# Patient Record
Sex: FEMALE | Race: WHITE | NOT HISPANIC OR LATINO | Employment: UNEMPLOYED | URBAN - METROPOLITAN AREA
[De-identification: names, ages, dates, MRNs, and addresses within clinical notes are randomized per-mention and may not be internally consistent; named-entity substitution may affect disease eponyms.]

---

## 2017-01-04 DIAGNOSIS — K21.9 GASTROESOPHAGEAL REFLUX DISEASE WITHOUT ESOPHAGITIS: ICD-10-CM

## 2017-01-04 DIAGNOSIS — E03.9 ACQUIRED HYPOTHYROIDISM: ICD-10-CM

## 2017-01-04 RX ORDER — LEVOTHYROXINE SODIUM 137 UG/1
137 TABLET ORAL
Qty: 30 TAB | Refills: 5 | Status: SHIPPED | OUTPATIENT
Start: 2017-01-04 | End: 2018-06-08

## 2017-01-04 RX ORDER — OMEPRAZOLE 20 MG/1
20 CAPSULE, DELAYED RELEASE ORAL DAILY
Qty: 30 CAP | Refills: 5 | Status: SHIPPED | OUTPATIENT
Start: 2017-01-04 | End: 2017-03-31 | Stop reason: SDUPTHER

## 2017-01-04 NOTE — TELEPHONE ENCOUNTER
========Cyclobenzaprine 5mg qhs is no longer on med list, I do not see any note of why it was discontinued. Refill? Or reason d/c?=======    Last seen: 12/28/16 by Dr. Amaya  Next appt: 2/1/17 with Dr. Amaya    Was the patient seen in the last year in this department? Yes   Does patient have an active prescription for medications requested? No   Received Request Via: Pharmacy

## 2017-02-28 RX ORDER — OMEGA-3/DHA/EPA/FISH OIL 60 MG-90MG
CAPSULE ORAL
Qty: 30 CAP | Refills: 0 | Status: SHIPPED | OUTPATIENT
Start: 2017-02-28 | End: 2017-04-04 | Stop reason: SDUPTHER

## 2017-02-28 NOTE — TELEPHONE ENCOUNTER
Last seen: 12/28/16 by Dr. Amaya  Next appt: 03/06/17 with Dr. Amaya    Was the patient seen in the last year in this department? Yes   Does patient have an active prescription for medications requested? No   Received Request Via: Pharmacy

## 2017-03-06 ENCOUNTER — APPOINTMENT (OUTPATIENT)
Dept: INTERNAL MEDICINE | Facility: MEDICAL CENTER | Age: 42
End: 2017-03-06
Payer: MEDICARE

## 2017-03-08 ENCOUNTER — OFFICE VISIT (OUTPATIENT)
Dept: INTERNAL MEDICINE | Facility: MEDICAL CENTER | Age: 42
End: 2017-03-08
Payer: MEDICARE

## 2017-03-08 ENCOUNTER — HOSPITAL ENCOUNTER (OUTPATIENT)
Facility: MEDICAL CENTER | Age: 42
End: 2017-03-08
Attending: STUDENT IN AN ORGANIZED HEALTH CARE EDUCATION/TRAINING PROGRAM
Payer: MEDICARE

## 2017-03-08 VITALS
SYSTOLIC BLOOD PRESSURE: 153 MMHG | HEART RATE: 85 BPM | HEIGHT: 69 IN | OXYGEN SATURATION: 94 % | WEIGHT: 288.6 LBS | TEMPERATURE: 97.8 F | DIASTOLIC BLOOD PRESSURE: 100 MMHG | BODY MASS INDEX: 42.75 KG/M2

## 2017-03-08 DIAGNOSIS — N63.25 BREAST LUMP ON LEFT SIDE AT 9 O'CLOCK POSITION: ICD-10-CM

## 2017-03-08 DIAGNOSIS — Z01.419 VISIT FOR GYNECOLOGIC EXAMINATION: ICD-10-CM

## 2017-03-08 DIAGNOSIS — R30.0 DYSURIA: ICD-10-CM

## 2017-03-08 DIAGNOSIS — M25.572 CHRONIC PAIN OF LEFT ANKLE: ICD-10-CM

## 2017-03-08 DIAGNOSIS — E66.01 MORBID OBESITY WITH BMI OF 40.0-44.9, ADULT (HCC): ICD-10-CM

## 2017-03-08 DIAGNOSIS — N63.15 BREAST LUMP ON RIGHT SIDE AT 3 O'CLOCK POSITION: ICD-10-CM

## 2017-03-08 DIAGNOSIS — G89.29 CHRONIC PAIN OF LEFT ANKLE: ICD-10-CM

## 2017-03-08 DIAGNOSIS — R03.0 ELEVATED BLOOD PRESSURE READING WITHOUT DIAGNOSIS OF HYPERTENSION: ICD-10-CM

## 2017-03-08 PROCEDURE — 87491 CHLMYD TRACH DNA AMP PROBE: CPT | Mod: GZ

## 2017-03-08 PROCEDURE — 4004F PT TOBACCO SCREEN RCVD TLK: CPT | Mod: 8P,GC | Performed by: INTERNAL MEDICINE

## 2017-03-08 PROCEDURE — 87591 N.GONORRHOEAE DNA AMP PROB: CPT | Mod: GZ

## 2017-03-08 PROCEDURE — G8482 FLU IMMUNIZE ORDER/ADMIN: HCPCS | Mod: GC | Performed by: INTERNAL MEDICINE

## 2017-03-08 PROCEDURE — 87070 CULTURE OTHR SPECIMN AEROBIC: CPT

## 2017-03-08 PROCEDURE — G8432 DEP SCR NOT DOC, RNG: HCPCS | Mod: GC | Performed by: INTERNAL MEDICINE

## 2017-03-08 PROCEDURE — 99214 OFFICE O/P EST MOD 30 MIN: CPT | Mod: GC | Performed by: INTERNAL MEDICINE

## 2017-03-08 PROCEDURE — 87205 SMEAR GRAM STAIN: CPT

## 2017-03-08 PROCEDURE — G8419 CALC BMI OUT NRM PARAM NOF/U: HCPCS | Mod: GC | Performed by: INTERNAL MEDICINE

## 2017-03-08 ASSESSMENT — PATIENT HEALTH QUESTIONNAIRE - PHQ9: CLINICAL INTERPRETATION OF PHQ2 SCORE: 0

## 2017-03-08 NOTE — MR AVS SNAPSHOT
"        Frances Ardon   3/8/2017 10:00 AM   Office Visit   MRN: [id number]    Department:  [facility] Med - Internal Med   Dept Phone:  [phone]    Description:  Female : 1975   Provider:  Javi Chu M.D.           Reason for Visit     Gynecologic Exam pap smear      Allergies as of 3/8/2017     No Known Allergies      You were diagnosed with     Dysuria   [788.1.ICD-9-CM]       Chronic pain of left ankle   [4766660]       Breast lump on left side at 9 o'clock position   [318314]       Breast lump   [368787]       Breast lump on right side at 3 o'clock position   [573029]       Visit for gynecologic examination   [160171]         Vital Signs     Blood Pressure Pulse Temperature Height Weight Body Mass Index    153/100 mmHg 85 36.6 °C (97.8 °F) 1.753 m (5' 9\") 130.908 kg (288 lb 9.6 oz) 42.60 kg/m2    Oxygen Saturation Smoking Status                94% Current Every Day Smoker          Basic Information     Date Of Birth Sex Race Ethnicity Preferred Language    1975 Female Unknown Non- English      Your appointments     Mar 15, 2017 10:30 AM   Established Patient with Ronni Sebastian M.D.   [facility] Med - Internal Medicine (--)    [address] [place] [address] [id number] [phone]           You will be receiving a confirmation call a few days before your appointment from our automated call confirmation system.              Problem List              ICD-10-CM Priority Class Noted - Resolved    Low back pain M54.5   2016 - Present    Constipation K59.00   2016 - Present    Mood disorder (CMS-HCC) F39   2016 - Present    Left ankle pain M25.572   2016 - Present    Morbid obesity (CMS-HCC) E66.01   2016 - Present    Tobacco dependence F17.200   2016 - Present    Hypothyroidism E03.9   2016 - Present    GERD (gastroesophageal reflux disease) K21.9   2016 - Present    Mental retardation, mild (I.Q. 50-70) F70   2016 " - Present    Dyslipidemia E78.5   12/27/2016 - Present    History of seizures Z87.898   12/27/2016 - Present      Health Maintenance        Date Due Completion Dates    IMM PNEUMOCOCCAL 19-64 (ADULT) MEDIUM RISK SERIES (1 of 1 - PPSV23) 11/9/1993 ---    PAP SMEAR 11/9/1995 ---    MAMMOGRAM 11/9/2014 ---    IMM DTaP/Tdap/Td Vaccine (2 - Td) 10/11/2019 10/11/2009            Current Immunizations     Influenza Vaccine Quad Inj (Pf) 10/5/2012    Influenza Vaccine Quad Inj (Preserved) 12/28/2016    Tdap Vaccine 10/11/2009      Below and/or attached are the medications your provider expects you to take. Review all of your home medications and newly ordered medications with your provider and/or pharmacist. Follow medication instructions as directed by your provider and/or pharmacist. Please keep your medication list with you and share with your provider. Update the information when medications are discontinued, doses are changed, or new medications (including over-the-counter products) are added; and carry medication information at all times in the event of emergency situations     Allergies:  No Known Allergies          Medications  Valid as of: March 08, 2017 - 10:57 AM    Generic Name Brand Name Tablet Size Instructions for use    Ibuprofen (Tab) MOTRIN 800 MG Take 1 Tab by mouth every 8 hours as needed for Mild Pain.        Levothyroxine Sodium (Tab) SYNTHROID 137 MCG Take 1 Tab by mouth Every morning on an empty stomach.        Omega-3 Fatty Acids (Cap) Fish Oil 500 MG TAKE 1 CAPSULE BY MOUTH 1 TIME DAILY        Omeprazole (CAPSULE DELAYED RELEASE) PRILOSEC 20 MG Take 1 Cap by mouth every day.        Paliperidone   Take  by mouth.        Polyethylene Glycol 3350 (Pack) MIRALAX  Take 17 g by mouth every day.        Sennosides (Tab) SENOKOT 8.6 MG Take 8.6 mg by mouth 1 time daily as needed.        Simvastatin   Take  by mouth.        .                 Medicines prescribed today were sent to:     [facility] -  [place], [place] - [address]    [address] [facility] [address]    Phone: [phone] Fax: [phone]    Open 24 Hours?: No      Medication refill instructions:       If your prescription bottle indicates you have medication refills left, it is not necessary to call your provider’s office. Please contact your pharmacy and they will refill your medication.    If your prescription bottle indicates you do not have any refills left, you may request refills at any time through one of the following ways: The online [employer] system (except Urgent Care), by calling your provider’s office, or by asking your pharmacy to contact your provider’s office with a refill request. Medication refills are processed only during regular business hours and may not be available until the next business day. Your provider may request additional information or to have a follow-up visit with you prior to refilling your medication.   *Please Note: Medication refills are assigned a new Rx number when refilled electronically. Your pharmacy may indicate that no refills were authorized even though a new prescription for the same medication is available at the pharmacy. Please request the medicine by name with the pharmacy before contacting your provider for a refill.        Your To Do List     Future Labs/Procedures Complete By Expires    CHLAMYDIA/GC PCR URINE OR SWAB  As directed 3/8/2018    URINALYSIS,CULTURE IF INDICATED  As directed 3/8/2018    US-BREAST BILAT-LIMITED  As directed 3/8/2018      Referral     A referral request has been sent to our patient care coordination department. Please allow 3-5 business days for us to process this request and contact you either by phone or mail. If you do not hear from us by the 5th business day, please call us at [phone].        Instructions    Return to clinic in 1 week with Dr. Sebastian for blood pressure check and addressing chronic issues  Referrals placed for gynecology and  podiatry  Perform urine study          MyChart Status: Patient Declined        Quit Tobacco Information     Do you want to quit using tobacco?    Quitting tobacco decreases risks of cancer, heart and lung disease, increases life expectancy, improves sense of taste and smell, and increases spending money, among other benefits.    If you are thinking about quitting, we can help.  • [facility] Quit Tobacco Program: 225.766.4049  o Program occurs weekly for four weeks and includes pharmacist consultation on products to support quitting smoking or chewing tobacco. A provider referral is needed for pharmacist consultation.  • Tobacco Users Help Hotline: 0-327-QUIT-NOW (837-3766) or https://[place].quitlogix.org/  o Free, confidential telephone and online coaching for [place] residents. Sessions are designed on a schedule that is convenient for you. Eligible clients receive free nicotine replacement therapy.  • Nationally: www.smokefree.gov  o Information and professional assistance to support both immediate and long-term needs as you become, and remain, a non-smoker. Smokefree.gov allows you to choose the help that best fits your needs.

## 2017-03-08 NOTE — PATIENT INSTRUCTIONS
Return to clinic in 1 week with Dr. Sebastian for blood pressure check and addressing chronic issues  Referrals placed for gynecology and podiatry  Perform urine study

## 2017-03-09 LAB
GRAM STN SPEC: NORMAL
SIGNIFICANT IND 70042: NORMAL
SITE SITE: NORMAL
SOURCE SOURCE: NORMAL

## 2017-03-09 NOTE — PROGRESS NOTES
Established Patient    Frances presents today with the following:    CC: Annual pap smear    HPI:     42 y/o female who presents initially for PAP smear. She has acute concerns as follows:    Breast lump: patient states she felt some pain in her bilateral breasts starting at 11:30 last night which developed into lumps she could feel bilaterally this morning. This has concerned her. No discharge noted or previous history of the same. Has never had a mammogram. No discharge from the nipple. Recently finished her menstrual period about 2 days ago and has never experienced fibrocystic changes in her breast.    Dysuria: Intermittent dysuria described by patient without systemic fevers or chills. She has noticed also some difficulty with urination as well.     Joint pains: chronic ankle and back pain she has experienced and states that her left ankle at the achilles tendon is hurting more. She has had previous surgery to same ankle in the past and is requesting some sort of therapy for pain.         Patient Active Problem List    Diagnosis Date Noted   • Low back pain 12/27/2016   • Constipation 12/27/2016   • Mood disorder (CMS-HCC) 12/27/2016   • Left ankle pain 12/27/2016   • Morbid obesity (CMS-HCC) 12/27/2016   • Tobacco dependence 12/27/2016   • Hypothyroidism 12/27/2016   • GERD (gastroesophageal reflux disease) 12/27/2016   • Mental retardation, mild (I.Q. 50-70) 12/27/2016   • Dyslipidemia 12/27/2016   • History of seizures 12/27/2016       Current Outpatient Prescriptions   Medication Sig Dispense Refill   • Omega-3 Fatty Acids (FISH OIL) 500 MG Cap TAKE 1 CAPSULE BY MOUTH 1 TIME DAILY 30 Cap 0   • levothyroxine (SYNTHROID) 137 MCG Tab Take 1 Tab by mouth Every morning on an empty stomach. 30 Tab 5   • omeprazole (PRILOSEC) 20 MG delayed-release capsule Take 1 Cap by mouth every day. 30 Cap 5   • polyethylene glycol/lytes (MIRALAX) Pack Take 17 g by mouth every day.     • Paliperidone (INVEGA PO) Take  by  "mouth.     • ibuprofen (MOTRIN) 800 MG TABS Take 1 Tab by mouth every 8 hours as needed for Mild Pain. 30 Tab 3   • sennosides (SENOKOT) 8.6 MG Tab Take 8.6 mg by mouth 1 time daily as needed.     • Simvastatin (ZOCOR PO) Take  by mouth.       No current facility-administered medications for this visit.           ROS: As per HPI. Denies nausea/vomiting/chest pain/shortness of breath. Has concurrent lower back pain which is chronic and recent weight gain.    /100 mmHg  Pulse 85  Temp(Src) 36.6 °C (97.8 °F)  Ht 1.753 m (5' 9\")  Wt 130.908 kg (288 lb 9.6 oz)  BMI 42.60 kg/m2  SpO2 94%        Physical Exam   Constitutional:  42 y/o female in no apparent distress, pleasant and cooperative  HEENT: PERRLA, EOMI, moist oral mucosa  Breast: 2-3 cm lump felt at 9 oclock position of left breast and 3 oclock position on right breast with some extension medially, firm without fluctuance noted, no axillary adenopathy, no nipple discharge, no obvious erythema or outward signs of trauma  Cardiovascular: RRR, S1 and S2 physiologic, no S3, no murmur or rubs  Pulmonary/Chest: CTAB, no wheeze or crackles  Abdomen: Obese abdomen, soft non tender, no distension, no guarding or rigidity  Musculoskeletal:  Swelling at lower left ankle which appears symmetrical to right side, mild pain to palpation over the heel with no erythema or warmth noted, no other joint abnormalities  Lymphadenopathy: no appreciable cervical LAD or axillary lymphadenopathy  Neurological:  Mild mental retardation, otherwise no focal motor or sensory deficits  GYN: Cervix unable to be visualized on gynecologic exam, skin tag present near outer labial fold on left, speculum exam reveals a dark brown to yellow serous secretion in vaginal vault without pelvic side wall erythema      Assessment and Plan    1. Dysuria  - on exam has vaginal discharge without pelvic side wall erythema, possible fungal appearance of drainage but no pelvic side wall erythema  - no " systemic signs or symptoms   - not currently sexually active but states unprotected sexual intercourse unknown how long ago  - drainage possibly represents old coagulated blood given recent menstrual period  Plan:  - patient unable to provide urinalysis as she urinated before appointment, sent to lab to get UA done  - CHLAMYDIA/GC PCR URINE OR SWAB; Future  - VAGINAL YEAST CULTURE  - needs repeat Pap/pelvic once period is done to make sure drainage resolved; hold off on treating as pt not sxmatic and it could rep resolving menstruation     2. Chronic pain of left ankle  - history of previous surgery on same ankle with appearance of chronic microtrauma to area  - REFERRAL TO PODIATRY    3. Breast lump on left side at 9 o'clock position     Breast lump on right side at 3 o'clock position  - possibly fibrocystic disease vs less likely abscess or Dominic's ligament pain  Plan:  - US-BREAST BILAT-LIMITED for further evaluation    4. Visit for gynecologic examination  - pelvic exam and findings as above  Plan:  - REFERRAL TO GYNECOLOGY for pap smear given unable to visualize cervix with our size of speculum    5. Obesity      Elevated blood pressure reading without diagnosis of hypertension  - patient counseled on need to lose weight to improve joint pain and her blood pressure  - BP re-check in one week      Follow up: Return in about 1 week (around 3/15/2017).    Signed by: Javi Chu M.D.

## 2017-03-12 LAB
BACTERIA GENITAL AEROBE CULT: NORMAL
GRAM STN SPEC: NORMAL
SIGNIFICANT IND 70042: NORMAL
SITE SITE: NORMAL
SOURCE SOURCE: NORMAL

## 2017-03-13 ENCOUNTER — TELEPHONE (OUTPATIENT)
Dept: INTERNAL MEDICINE | Facility: MEDICAL CENTER | Age: 42
End: 2017-03-13

## 2017-03-13 DIAGNOSIS — A74.9 CHLAMYDIA: ICD-10-CM

## 2017-03-13 LAB
C TRACH DNA SPEC QL NAA+PROBE: POSITIVE
N GONORRHOEA DNA SPEC QL NAA+PROBE: NEGATIVE
SOURCE 9121: ABNORMAL
SPECIMEN SOURCE: ABNORMAL

## 2017-03-13 RX ORDER — AZITHROMYCIN 500 MG/1
TABLET, FILM COATED ORAL
Qty: 2 TAB | Refills: 0 | Status: SHIPPED | OUTPATIENT
Start: 2017-03-13 | End: 2017-05-25
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
## 2017-03-13 NOTE — TELEPHONE ENCOUNTER
Patient called and informed that her swab was negative for bacterial infection. She has yet to do her breast ultrasound.

## 2017-03-14 ENCOUNTER — HOSPITAL ENCOUNTER (OUTPATIENT)
Dept: LAB | Facility: MEDICAL CENTER | Age: 42
End: 2017-03-14
Attending: STUDENT IN AN ORGANIZED HEALTH CARE EDUCATION/TRAINING PROGRAM
Payer: MEDICARE

## 2017-03-14 DIAGNOSIS — R30.0 DYSURIA: ICD-10-CM

## 2017-03-14 LAB
APPEARANCE UR: CLEAR
BACTERIA #/AREA URNS HPF: ABNORMAL /HPF
BILIRUB UR QL STRIP.AUTO: NEGATIVE
COLOR UR AUTO: ABNORMAL
CULTURE IF INDICATED INDCX: YES UA CULTURE
EPITHELIAL CELLS 1715: ABNORMAL /HPF
GLUCOSE UR STRIP.AUTO-MCNC: NEGATIVE MG/DL
KETONES UR STRIP.AUTO-MCNC: NEGATIVE MG/DL
LEUKOCYTE ESTERASE UR QL STRIP.AUTO: ABNORMAL
MICRO URNS: ABNORMAL
NITRITE UR QL STRIP.AUTO: NEGATIVE
PH UR: 7 [PH]
PROT UR QL STRIP: NEGATIVE MG/DL
RBC #/AREA URNS HPF: ABNORMAL /HPF
RBC UR QL AUTO: NEGATIVE
SP GR UR STRIP.AUTO: 1.01
WBC #/AREA URNS HPF: ABNORMAL /HPF

## 2017-03-14 PROCEDURE — 81001 URINALYSIS AUTO W/SCOPE: CPT

## 2017-03-14 PROCEDURE — 87086 URINE CULTURE/COLONY COUNT: CPT

## 2017-03-16 ENCOUNTER — OFFICE VISIT (OUTPATIENT)
Dept: INTERNAL MEDICINE | Facility: MEDICAL CENTER | Age: 42
End: 2017-03-16
Payer: MEDICARE

## 2017-03-16 VITALS
BODY MASS INDEX: 43.07 KG/M2 | TEMPERATURE: 97.2 F | HEIGHT: 69 IN | WEIGHT: 290.8 LBS | DIASTOLIC BLOOD PRESSURE: 88 MMHG | SYSTOLIC BLOOD PRESSURE: 135 MMHG | OXYGEN SATURATION: 98 % | HEART RATE: 78 BPM

## 2017-03-16 DIAGNOSIS — M25.532 LEFT WRIST PAIN: ICD-10-CM

## 2017-03-16 DIAGNOSIS — N39.0 URINARY TRACT INFECTION WITHOUT HEMATURIA, SITE UNSPECIFIED: ICD-10-CM

## 2017-03-16 LAB
BACTERIA UR CULT: NORMAL
SIGNIFICANT IND 70042: NORMAL
SOURCE SOURCE: NORMAL

## 2017-03-16 PROCEDURE — G8419 CALC BMI OUT NRM PARAM NOF/U: HCPCS | Mod: GC | Performed by: INTERNAL MEDICINE

## 2017-03-16 PROCEDURE — G8432 DEP SCR NOT DOC, RNG: HCPCS | Mod: GC | Performed by: INTERNAL MEDICINE

## 2017-03-16 PROCEDURE — 99213 OFFICE O/P EST LOW 20 MIN: CPT | Mod: GE | Performed by: INTERNAL MEDICINE

## 2017-03-16 PROCEDURE — 4004F PT TOBACCO SCREEN RCVD TLK: CPT | Mod: GC | Performed by: INTERNAL MEDICINE

## 2017-03-16 PROCEDURE — G8482 FLU IMMUNIZE ORDER/ADMIN: HCPCS | Mod: GC | Performed by: INTERNAL MEDICINE

## 2017-03-16 RX ORDER — NITROFURANTOIN 25; 75 MG/1; MG/1
100 CAPSULE ORAL 2 TIMES DAILY
Qty: 14 CAP | Refills: 0 | Status: SHIPPED | OUTPATIENT
Start: 2017-03-16 | End: 2017-03-28 | Stop reason: SDUPTHER
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
## 2017-03-16 NOTE — MR AVS SNAPSHOT
"        Frances Ardon   3/16/2017 4:15 PM   Office Visit   MRN: [id number]    Department:  [facility] Med - Internal Med   Dept Phone:  [phone]    Description:  Female : 1975   Provider:  Ronni Sebastian M.D.           Reason for Visit     Follow-Up follow up    Hand Pain x2 days and see if she can get brace for it for work      Allergies as of 3/16/2017     No Known Allergies      You were diagnosed with     Urinary tract infection without hematuria, site unspecified   [3040980]       Left wrist pain   [100874]         Vital Signs     Blood Pressure Pulse Temperature Height Weight Body Mass Index    135/88 mmHg 78 36.2 °C (97.2 °F) 1.753 m (5' 9\") 131.906 kg (290 lb 12.8 oz) 42.92 kg/m2    Oxygen Saturation Smoking Status                98% Current Every Day Smoker          Basic Information     Date Of Birth Sex Race Ethnicity Preferred Language    1975 Female Unknown Non- English      Your appointments     2017  2:00 PM   MA DX30 with S VERO MG 1   [facility] MAMMOGRAPHY ([place])    [address]  [facility] [id number] [phone]            May 16, 2017 10:00 AM   New Patient with Danita Rivers M.D.   [facility] ([address])    [address]  [facility] [id number] [phone]            May 25, 2017  1:15 PM   Established Patient with Ronni Sebastian M.D.   [facility] Med - Internal Medicine (--)    [address] [place] [address]  [address]  [facility] [id number] [phone]           You will be receiving a confirmation call a few days before your appointment from our automated call confirmation system.              Problem List              ICD-10-CM Priority Class Noted - Resolved    Low back pain M54.5   2016 - Present    Constipation K59.00   2016 - Present    Mood disorder (CMS-HCC) F39   2016 - Present    Left ankle pain M25.572   2016 - Present    Morbid " obesity (CMS-[facility]) E66.01   12/27/2016 - Present    Tobacco dependence F17.200   12/27/2016 - Present    Hypothyroidism E03.9   12/27/2016 - Present    GERD (gastroesophageal reflux disease) K21.9   12/27/2016 - Present    Mental retardation, mild (I.Q. 50-70) F70   12/27/2016 - Present    Dyslipidemia E78.5   12/27/2016 - Present    History of seizures Z87.898   12/27/2016 - Present    Elevated blood pressure reading without diagnosis of hypertension R03.0   3/8/2017 - Present    Breast lump on left side at 9 o'clock position N63   3/8/2017 - Present    Breast lump on right side at 3 o'clock position N63   3/8/2017 - Present      Health Maintenance        Date Due Completion Dates    IMM PNEUMOCOCCAL 19-64 (ADULT) MEDIUM RISK SERIES (1 of 1 - PPSV23) 11/9/1993 ---    PAP SMEAR 11/9/1995 ---    MAMMOGRAM 11/9/2014 ---    IMM DTaP/Tdap/Td Vaccine (2 - Td) 10/11/2019 10/11/2009            Current Immunizations     Influenza Vaccine Quad Inj (Pf) 10/5/2012    Influenza Vaccine Quad Inj (Preserved) 12/28/2016    Tdap Vaccine 10/11/2009      Below and/or attached are the medications your provider expects you to take. Review all of your home medications and newly ordered medications with your provider and/or pharmacist. Follow medication instructions as directed by your provider and/or pharmacist. Please keep your medication list with you and share with your provider. Update the information when medications are discontinued, doses are changed, or new medications (including over-the-counter products) are added; and carry medication information at all times in the event of emergency situations     Allergies:  No Known Allergies          Medications  Valid as of: March 16, 2017 -  4:24 PM    Generic Name Brand Name Tablet Size Instructions for use    Azithromycin (Tab) ZITHROMAX 500 MG Please take 1g of Azithromycin x 1.        Azithromycin (Tab) ZITHROMAX 500 MG Please take 1g of Lucia x 1 for infection.        Elastic  Bandages & Supports (Misc) Wrist Brace//Left Large  1 Units by Does not apply route every day for 7 days.        Ibuprofen (Tab) MOTRIN 800 MG Take 1 Tab by mouth every 8 hours as needed for Mild Pain.        Levothyroxine Sodium (Tab) SYNTHROID 137 MCG Take 1 Tab by mouth Every morning on an empty stomach.        Nitrofurantoin Monohyd Macro (Cap) MACROBID 100 MG Take 1 Cap by mouth 2 times a day for 14 days.        Omega-3 Fatty Acids (Cap) Fish Oil 500 MG TAKE 1 CAPSULE BY MOUTH 1 TIME DAILY        Omeprazole (CAPSULE DELAYED RELEASE) PRILOSEC 20 MG Take 1 Cap by mouth every day.        Paliperidone   Take  by mouth.        Polyethylene Glycol 3350 (Pack) MIRALAX  Take 17 g by mouth every day.        Sennosides (Tab) SENOKOT 8.6 MG Take 8.6 mg by mouth 1 time daily as needed.        Simvastatin   Take  by mouth.        .                 Medicines prescribed today were sent to:     [facility] [address] [facility] [address]    Phone: [phone] Fax: [phone]    Open 24 Hours?: No      Medication refill instructions:       If your prescription bottle indicates you have medication refills left, it is not necessary to call your provider’s office. Please contact your pharmacy and they will refill your medication.    If your prescription bottle indicates you do not have any refills left, you may request refills at any time through one of the following ways: The online [employer] system (except Urgent Care), by calling your provider’s office, or by asking your pharmacy to contact your provider’s office with a refill request. Medication refills are processed only during regular business hours and may not be available until the next business day. Your provider may request additional information or to have a follow-up visit with you prior to refilling your medication.   *Please Note: Medication refills are assigned a new Rx number when refilled electronically. Your  pharmacy may indicate that no refills were authorized even though a new prescription for the same medication is available at the pharmacy. Please request the medicine by name with the pharmacy before contacting your provider for a refill.        Instructions    Return in about 10 weeks (around 5/25/2017).            MyChart Status: Patient Declined        Quit Tobacco Information     Do you want to quit using tobacco?    Quitting tobacco decreases risks of cancer, heart and lung disease, increases life expectancy, improves sense of taste and smell, and increases spending money, among other benefits.    If you are thinking about quitting, we can help.  • [facility] Quit Tobacco Program: [phone]  o Program occurs weekly for four weeks and includes pharmacist consultation on products to support quitting smoking or chewing tobacco. A provider referral is needed for pharmacist consultation.  • Tobacco Users Help Hotline: 9-358-QUIT-NOW (295-6588) or https://[place].quitlogix.org/  o Free, confidential telephone and online coaching for [place] residents. Sessions are designed on a schedule that is convenient for you. Eligible clients receive free nicotine replacement therapy.  • Nationally: www.smokefree.gov  o Information and professional assistance to support both immediate and long-term needs as you become, and remain, a non-smoker. Smokefree.gov allows you to choose the help that best fits your needs.

## 2017-03-17 NOTE — PROGRESS NOTES
Established Patient    Frances Ardon is a 41 y.o. female who presents today with the following:    CC: ***    HPI: ***    No problem-specific assessment & plan notes found for this encounter.        ROS: As per HPI. Additional pertinent symptoms as noted below.    ROS:  {ROS:09856}    Patient Active Problem List    Diagnosis Date Noted   • Elevated blood pressure reading without diagnosis of hypertension 03/08/2017   • Breast lump on left side at 9 o'clock position 03/08/2017   • Breast lump on right side at 3 o'clock position 03/08/2017   • Low back pain 12/27/2016   • Constipation 12/27/2016   • Mood disorder (CMS-HCC) 12/27/2016   • Left ankle pain 12/27/2016   • Morbid obesity (CMS-[facility]) 12/27/2016   • Tobacco dependence 12/27/2016   • Hypothyroidism 12/27/2016   • GERD (gastroesophageal reflux disease) 12/27/2016   • Mental retardation, mild (I.Q. 50-70) 12/27/2016   • Dyslipidemia 12/27/2016   • History of seizures 12/27/2016       Social History   Substance Use Topics   • Smoking status: Current Every Day Smoker -- 1.00 packs/day for 12 years   • Smokeless tobacco: Never Used   • Alcohol Use: No       Current Outpatient Prescriptions   Medication Sig Dispense Refill   • Elastic Bandages & Supports (WRIST BRACE//LEFT LARGE) Misc 1 Units by Does not apply route every day for 7 days. 1 Each 0   • nitrofurantoin monohydr macro (MACROBID) 100 MG Cap Take 1 Cap by mouth 2 times a day for 14 days. 14 Cap 0   • azithromycin (ZITHROMAX) 500 MG tablet Please take 1g of Azithromycin x 1. 2 Tab 0   • azithromycin (ZITHROMAX) 500 MG tablet Please take 1g of Lucia x 1 for infection. 2 Tab 0   • Omega-3 Fatty Acids (FISH OIL) 500 MG Cap TAKE 1 CAPSULE BY MOUTH 1 TIME DAILY 30 Cap 0   • levothyroxine (SYNTHROID) 137 MCG Tab Take 1 Tab by mouth Every morning on an empty stomach. 30 Tab 5   • omeprazole (PRILOSEC) 20 MG delayed-release capsule Take 1 Cap by mouth every day. 30 Cap 5   • polyethylene glycol/lytes  "(MIRALAX) Pack Take 17 g by mouth every day.     • sennosides (SENOKOT) 8.6 MG Tab Take 8.6 mg by mouth 1 time daily as needed.     • Paliperidone (INVEGA PO) Take  by mouth.     • Simvastatin (ZOCOR PO) Take  by mouth.     • ibuprofen (MOTRIN) 800 MG TABS Take 1 Tab by mouth every 8 hours as needed for Mild Pain. 30 Tab 3     No current facility-administered medications for this visit.       /88 mmHg  Pulse 78  Temp(Src) 36.2 °C (97.2 °F)  Ht 1.753 m (5' 9\")  Wt 131.906 kg (290 lb 12.8 oz)  BMI 42.92 kg/m2  SpO2 98%    Physical Exam  General: Well developed, well nourished female, in no distress.  Eyes: Conjuntiva without any obvious injection or erythema.   Ears- canals and TMs clear  Mouth- mucous membranes moist, no erythema  Cardiovascular: Heart is regular with no murmurs, no bruits  Lungs: Clear to auscultation bilaterally. No wheezes, rhonci or crackles heard. Respiratory effort is normal.  Abd: Soft, non-tender  Ext: No edema  Other: ***      Assessment and Plan      1. Urinary tract infection without hematuria, site unspecified  ***  - nitrofurantoin monohydr macro (MACROBID) 100 MG Cap; Take 1 Cap by mouth 2 times a day for 14 days.  Dispense: 14 Cap; Refill: 0    2. Left wrist pain  ***  - Elastic Bandages & Supports (WRIST BRACE//LEFT LARGE) Misc; 1 Units by Does not apply route every day for 7 days.  Dispense: 1 Each; Refill: 0      Return in about 10 weeks (around 5/25/2017).      Signed by: Gisel Dupont M.D.    This note was created using voice recognition software. There may be unintended errors in spelling, grammar or content.              "

## 2017-03-28 DIAGNOSIS — N39.0 URINARY TRACT INFECTION WITHOUT HEMATURIA, SITE UNSPECIFIED: ICD-10-CM

## 2017-03-28 RX ORDER — NITROFURANTOIN 25; 75 MG/1; MG/1
100 CAPSULE ORAL 2 TIMES DAILY
Qty: 14 CAP | Refills: 0 | Status: SHIPPED | OUTPATIENT
Start: 2017-03-28 | End: 2017-04-04

## 2017-03-31 DIAGNOSIS — K21.9 GASTROESOPHAGEAL REFLUX DISEASE WITHOUT ESOPHAGITIS: ICD-10-CM

## 2017-03-31 RX ORDER — OMEPRAZOLE 20 MG/1
20 CAPSULE, DELAYED RELEASE ORAL DAILY
Qty: 90 CAP | Refills: 0 | Status: SHIPPED | OUTPATIENT
Start: 2017-03-31 | End: 2017-06-29 | Stop reason: SDUPTHER

## 2017-03-31 NOTE — TELEPHONE ENCOUNTER
Last seen: 03/16/17 by Dr. Sebastian  Next appt: 05/25/17 with Dr. Sebastian    Was the patient seen in the last year in this department? Yes   Does patient have an active prescription for medications requested? No   Received Request Via: Pharmacy

## 2017-04-04 ENCOUNTER — HOSPITAL ENCOUNTER (OUTPATIENT)
Dept: RADIOLOGY | Facility: MEDICAL CENTER | Age: 42
End: 2017-04-04
Attending: STUDENT IN AN ORGANIZED HEALTH CARE EDUCATION/TRAINING PROGRAM
Payer: MEDICARE

## 2017-04-04 DIAGNOSIS — N63.15 BREAST LUMP ON RIGHT SIDE AT 3 O'CLOCK POSITION: ICD-10-CM

## 2017-04-04 DIAGNOSIS — N63.25 BREAST LUMP ON LEFT SIDE AT 9 O'CLOCK POSITION: ICD-10-CM

## 2017-04-04 PROCEDURE — G0204 DX MAMMO INCL CAD BI: HCPCS

## 2017-04-04 RX ORDER — OMEGA-3/DHA/EPA/FISH OIL 60 MG-90MG
CAPSULE ORAL
Qty: 90 CAP | Refills: 0 | Status: SHIPPED | OUTPATIENT
Start: 2017-04-04 | End: 2017-06-29 | Stop reason: SDUPTHER

## 2017-05-05 DIAGNOSIS — M25.572 LEFT ANKLE PAIN, UNSPECIFIED CHRONICITY: ICD-10-CM

## 2017-05-08 RX ORDER — IBUPROFEN 800 MG/1
800 TABLET ORAL EVERY 8 HOURS PRN
Qty: 30 TAB | Refills: 3 | Status: SHIPPED | OUTPATIENT
Start: 2017-05-08 | End: 2018-05-30 | Stop reason: SDUPTHER

## 2017-05-23 ENCOUNTER — GYNECOLOGY VISIT (OUTPATIENT)
Dept: OBGYN | Facility: CLINIC | Age: 42
End: 2017-05-23
Payer: MEDICARE

## 2017-05-23 ENCOUNTER — HOSPITAL ENCOUNTER (OUTPATIENT)
Facility: MEDICAL CENTER | Age: 42
End: 2017-05-23
Attending: OBSTETRICS & GYNECOLOGY
Payer: MEDICARE

## 2017-05-23 VITALS
WEIGHT: 293 LBS | DIASTOLIC BLOOD PRESSURE: 88 MMHG | HEIGHT: 69 IN | BODY MASS INDEX: 43.4 KG/M2 | SYSTOLIC BLOOD PRESSURE: 132 MMHG

## 2017-05-23 DIAGNOSIS — Z12.4 ROUTINE CERVICAL SMEAR: ICD-10-CM

## 2017-05-23 DIAGNOSIS — Z01.419 WELL WOMAN EXAM: ICD-10-CM

## 2017-05-23 DIAGNOSIS — Z11.51 SCREENING FOR HPV (HUMAN PAPILLOMAVIRUS): ICD-10-CM

## 2017-05-23 PROCEDURE — G0101 CA SCREEN;PELVIC/BREAST EXAM: HCPCS | Performed by: OBSTETRICS & GYNECOLOGY

## 2017-05-23 PROCEDURE — 88175 CYTOPATH C/V AUTO FLUID REDO: CPT

## 2017-05-23 PROCEDURE — 87624 HPV HI-RISK TYP POOLED RSLT: CPT

## 2017-05-23 PROCEDURE — 87591 N.GONORRHOEAE DNA AMP PROB: CPT

## 2017-05-23 PROCEDURE — G8432 DEP SCR NOT DOC, RNG: HCPCS | Performed by: OBSTETRICS & GYNECOLOGY

## 2017-05-23 PROCEDURE — 87491 CHLMYD TRACH DNA AMP PROBE: CPT

## 2017-05-23 NOTE — PROGRESS NOTES
Frances Ardon41 y.o.  female presents for Annual Well Woman Exam.   Patient is currently without complaints. Patient does not report any problems with her menses. She states they are not heavy nor painful. It is difficult to assess whether or not her menstrual cycle is regular as patient has some developmental delay. She states her last period was on Friday. She also reports cramps with her menstrual cycle for which she takes a pain pill seemed to improve her problems  Patient is not on anything for birth control, at this time does not desire any birth control method. She is sexually active with her boyfriend. They plan on getting  and having a child      ROS: Patient is feeling well. No dyspnea or chest pain on exertion. No Abdominal pain, change in bowel habits, black or bloody stools. No urinary sx. GYN ROS:normal menses, no abnormal bleeding, pelvic pain or discharge, no breast pain or new or enlarging lumps on self exam, no discharge or pelvic pain. Denies breast tenderness, mass, discharge, changes in size or contour, or abnormal cyclic discomfort. No neurological complaints.  Past Medical History   Diagnosis Date   • Tobacco dependence 2016   • Hypothyroidism 2016   • GERD (gastroesophageal reflux disease) 2016   • Mental retardation, mild (I.Q. 50-70) 2016   • Dyslipidemia 2016   • History of seizures 2016   • Arthritis    • Asthma    • Hypertension      None  Allergy:   Review of patient's allergies indicates no known allergies.    LMP:       Patient's last menstrual period was 2017. .     Menstrual History: menses irregular: Unknown menstrual history, patient cannot give menstrual history due to developmental delay  Contraceptive Method:none    Pap history, the patient denies abnormal Pap smears and denies   sexually transmitted diseases    Mammo:done normal    Objective : The patient appears well, alert and oriented x 3, in no acute  "distress.  Blood pressure 132/88, height 1.753 m (5' 9\"), weight 135.626 kg (299 lb), last menstrual period 05/19/2017.  HEENT Exam: EOMI, KINDRA, no adenopathy or thyromegaly.  Lungs: Clear to Auscultation   Cor: S1 and S2 normal, no murmurs, or rubs   Abdomen: Soft without tenderness, guarding mass or organomegaly.  Extremities: No edema, pulses equal  Neurological: Normal No focal signs  Breast Exam:Inspection negative. No nipple discharge or bleeding. No masses or nodularity palpable  Pelvic: External genitalia,urethral meatus, urethra, bladder and vagina normal. Cervix, uterus and adnexa intact and normal.  Anus and perineum normal. Bimanual and rectovaginal without masses or tenderness., negative findings: external genitalia normal, Bartholin's glands, urethra, Chattaroy's glands negative, vaginal mucosa normal, cervix clear, normal sized uterus, adnexae negative    Lab:No results found for this or any previous visit (from the past 336 hour(s)).    Assessment:  well woman    Plan:pap smear  counseled on breast self exam, mammography screening and family planning choices  return annually or prn  Smoking Cessation  Contraception:none            "

## 2017-05-23 NOTE — MR AVS SNAPSHOT
"        Frances Ardon   2017 10:30 AM   Gynecology Visit   MRN: [id number]    Department[facility]  [facility]   Dept Phone:  [phone]    Description:  Female : 1975   Provider:  Danita Rivers M.D.           Reason for Visit     Gynecologic Exam           Allergies as of 2017     No Known Allergies      You were diagnosed with     Well woman exam   [089731]       Routine cervical smear   [475114]       Screening for HPV (human papillomavirus)   [693472]         Vital Signs     Blood Pressure Height Weight Body Mass Index Last Menstrual Period Smoking Status    132/88 mmHg 1.753 m (5' 9\") 135.626 kg (299 lb) 44.13 kg/m2 2017 Current Every Day Smoker      Basic Information     Date Of Birth Sex Race Ethnicity Preferred Language    1975 Female Unknown Non- English      Your appointments     May 25, 2017  1:15 PM   Established Patient with Ronni Sebastian M.D.   [facility] - Internal Medicine (--)    [address] [place] [id number] [phone]           You will be receiving a confirmation call a few days before your appointment from our automated call confirmation system.              Problem List              ICD-10-CM Priority Class Noted - Resolved    Low back pain M54.5   2016 - Present    Constipation K59.00   2016 - Present    Mood disorder (CMS-HCC) F39   2016 - Present    Left ankle pain M25.572   2016 - Present    Morbid obesity (CMS-HCC) E66.01   2016 - Present    Tobacco dependence F17.200   2016 - Present    Hypothyroidism E03.9   2016 - Present    GERD (gastroesophageal reflux disease) K21.9   2016 - Present    Mental retardation, mild (I.Q. 50-70) F70   2016 - Present    Dyslipidemia E78.5   2016 - Present    History of seizures Z87.898   2016 - Present    Elevated blood pressure reading without diagnosis of hypertension R03.0   3/8/2017 - Present   " Breast lump on left side at 9 o'clock position N63   3/8/2017 - Present    Breast lump on right side at 3 o'clock position N63   3/8/2017 - Present      Health Maintenance        Date Due Completion Dates    IMM PNEUMOCOCCAL 19-64 (ADULT) MEDIUM RISK SERIES (1 of 1 - PPSV23) 11/9/1993 ---    PAP SMEAR 11/9/1995 ---    MAMMOGRAM 4/4/2018 4/4/2017    IMM DTaP/Tdap/Td Vaccine (2 - Td) 10/11/2019 10/11/2009            Current Immunizations     Influenza Vaccine Quad Inj (Pf) 10/5/2012    Influenza Vaccine Quad Inj (Preserved) 12/28/2016    Tdap Vaccine 10/11/2009      Below and/or attached are the medications your provider expects you to take. Review all of your home medications and newly ordered medications with your provider and/or pharmacist. Follow medication instructions as directed by your provider and/or pharmacist. Please keep your medication list with you and share with your provider. Update the information when medications are discontinued, doses are changed, or new medications (including over-the-counter products) are added; and carry medication information at all times in the event of emergency situations     Allergies:  No Known Allergies          Medications  Valid as of: May 23, 2017 - 10:48 AM    Generic Name Brand Name Tablet Size Instructions for use    Azithromycin (Tab) ZITHROMAX 500 MG Please take 1g of Azithromycin x 1.        Azithromycin (Tab) ZITHROMAX 500 MG Please take 1g of Lucia x 1 for infection.        Ibuprofen (Tab) MOTRIN 800 MG Take 1 Tab by mouth every 8 hours as needed for Mild Pain.        Levothyroxine Sodium (Tab) SYNTHROID 137 MCG Take 1 Tab by mouth Every morning on an empty stomach.        Omega-3 Fatty Acids (Cap) Fish Oil 500 MG TAKE 1 CAPSULE BY MOUTH 1 TIME DAILY        Omeprazole (CAPSULE DELAYED RELEASE) PRILOSEC 20 MG Take 1 Cap by mouth every day.        Paliperidone   Take  by mouth.        Polyethylene Glycol 3350 (Pack) MIRALAX  Take 17 g by mouth every day.         Sennosides (Tab) SENOKOT 8.6 MG Take 8.6 mg by mouth 1 time daily as needed.        Simvastatin   Take  by mouth.        .                 Medicines prescribed today were sent to:     [facility] [address] [facility] [address]    [address] [facility] [address] [facility] [address]    Phone: [phone] Fax: [phone]    Open 24 Hours?: No    [facility] [address] [place] [address] (), [place] - [address]    [address] () [place] [address]    Phone: [phone] Fax: [phone]    Open 24 Hours?: No      Medication refill instructions:       If your prescription bottle indicates you have medication refills left, it is not necessary to call your provider’s office. Please contact your pharmacy and they will refill your medication.    If your prescription bottle indicates you do not have any refills left, you may request refills at any time through one of the following ways: The online [employer] system (except Urgent Care), by calling your provider’s office, or by asking your pharmacy to contact your provider’s office with a refill request. Medication refills are processed only during regular business hours and may not be available until the next business day. Your provider may request additional information or to have a follow-up visit with you prior to refilling your medication.   *Please Note: Medication refills are assigned a new Rx number when refilled electronically. Your pharmacy may indicate that no refills were authorized even though a new prescription for the same medication is available at the pharmacy. Please request the medicine by name with the pharmacy before contacting your provider for a refill.           [employer] Status: Patient Declined        Quit Tobacco Information     Do you want to quit using tobacco?    Quitting tobacco decreases risks of cancer, heart and lung disease, increases life expectancy, improves sense of taste and smell, and increases spending money, among other  benefits.    If you are thinking about quitting, we can help.  • [facility] Quit Tobacco Program: 833-907-4237  o Program occurs weekly for four weeks and includes pharmacist consultation on products to support quitting smoking or chewing tobacco. A provider referral is needed for pharmacist consultation.  • Tobacco Users Help Hotline: 1-800-QUIT-NOW (774-0508) or https://[place].quitlogix.org/  o Free, confidential telephone and online coaching for [place] residents. Sessions are designed on a schedule that is convenient for you. Eligible clients receive free nicotine replacement therapy.  • Nationally: www.smokefree.gov  o Information and professional assistance to support both immediate and long-term needs as you become, and remain, a non-smoker. Smokefree.gov allows you to choose the help that best fits your needs.

## 2017-05-24 LAB
C TRACH DNA GENITAL QL NAA+PROBE: NEGATIVE
CYTOLOGY REG CYTOL: ABNORMAL
HPV HR 12 DNA CVX QL NAA+PROBE: POSITIVE
HPV16 DNA SPEC QL NAA+PROBE: NEGATIVE
HPV18 DNA SPEC QL NAA+PROBE: NEGATIVE
N GONORRHOEA DNA GENITAL QL NAA+PROBE: NEGATIVE
SPECIMEN SOURCE: ABNORMAL
SPECIMEN SOURCE: ABNORMAL

## 2017-05-25 ENCOUNTER — OFFICE VISIT (OUTPATIENT)
Dept: INTERNAL MEDICINE | Facility: MEDICAL CENTER | Age: 42
End: 2017-05-25
Payer: MEDICARE

## 2017-05-25 VITALS
DIASTOLIC BLOOD PRESSURE: 90 MMHG | HEIGHT: 69 IN | OXYGEN SATURATION: 97 % | BODY MASS INDEX: 43.4 KG/M2 | WEIGHT: 293 LBS | TEMPERATURE: 97.6 F | SYSTOLIC BLOOD PRESSURE: 140 MMHG | HEART RATE: 89 BPM

## 2017-05-25 DIAGNOSIS — M79.674 PAIN OF TOE OF RIGHT FOOT: ICD-10-CM

## 2017-05-25 DIAGNOSIS — F17.200 TOBACCO DEPENDENCE: ICD-10-CM

## 2017-05-25 DIAGNOSIS — E78.5 DYSLIPIDEMIA: ICD-10-CM

## 2017-05-25 DIAGNOSIS — E03.9 HYPOTHYROIDISM, UNSPECIFIED TYPE: ICD-10-CM

## 2017-05-25 DIAGNOSIS — E66.01 MORBID OBESITY DUE TO EXCESS CALORIES (HCC): ICD-10-CM

## 2017-05-25 PROBLEM — R03.0 ELEVATED BLOOD PRESSURE READING WITHOUT DIAGNOSIS OF HYPERTENSION: Status: RESOLVED | Noted: 2017-03-08 | Resolved: 2017-05-25

## 2017-05-25 PROCEDURE — 99214 OFFICE O/P EST MOD 30 MIN: CPT | Mod: GC | Performed by: INTERNAL MEDICINE

## 2017-05-25 PROCEDURE — 4004F PT TOBACCO SCREEN RCVD TLK: CPT | Mod: GC | Performed by: INTERNAL MEDICINE

## 2017-05-25 PROCEDURE — G8432 DEP SCR NOT DOC, RNG: HCPCS | Mod: GC | Performed by: INTERNAL MEDICINE

## 2017-05-25 PROCEDURE — G8419 CALC BMI OUT NRM PARAM NOF/U: HCPCS | Mod: GC | Performed by: INTERNAL MEDICINE

## 2017-05-25 NOTE — PROGRESS NOTES
Established Patient    Frances presents today with the following:    CC:  Chief Complaint   Patient presents with   • Toe Pain     Right [place]. Red/Pain x3 days         HPI:    Ms. Ardon is a 41-year-old female with a past medical history significant for mild developmental delay, mood disorder, hypothyroidism presents to the clinic for routine follow-up visit. She lives in [place] [address] in [place], [place]. She quit school after about 9-10 grade.     Acute Issues:  1. Toe pain   Patient reports onset of symptoms about 3 days ago with continued pain in the right big toe, primarily involving the lateral aspect with tenderness to palpation of the nail/lateral toe. There has been no purulent discharge. There is no associated erythema or swelling. She denies the use of alcohol, red meats or seafood. She reports wearing comfortable shoes but wear socks throughout the day. She has trouble trimming her nails and usually needs some help from the people at the group home. She has never had similar symptoms in the past and denies any history of gout/inflammatory arthritis. She denies involvement of any other joints.     2. UTI  Patient was seen for urinary tract like symptoms in the past and was found to have UTI/chlamydia infection. At today's visit she reports complete resolution of symptoms and denies any dysuria, pyuria or vaginal discharge. Safe sex practices were again reiterated to the patient. She was able to establish with gynecology just a couple of days ago for her woman's health care needs.    3. Hypothyroidism   She is currently on thyroid replacement but reports no signs or symptoms of hyper or hypothyroidism.    Health maintenance:  1. PAP smear - completed 5/23/17   2. Mammogram - completed 4/4/17 - no evidence of malignancy       Patient Active Problem List    Diagnosis Date Noted   • Breast lump on left side at 9 o'clock position 03/08/2017   • Breast lump on right side at 3 o'clock position 03/08/2017  "  • Constipation 12/27/2016   • Mood disorder (CMS-[facility]) 12/27/2016   • Tobacco dependence 12/27/2016   • Hypothyroidism 12/27/2016   • GERD (gastroesophageal reflux disease) 12/27/2016   • Mental retardation, mild (I.Q. 50-70) 12/27/2016   • Dyslipidemia 12/27/2016   • History of seizures 12/27/2016       Current Outpatient Prescriptions   Medication Sig Dispense Refill   • ibuprofen (MOTRIN) 800 MG Tab Take 1 Tab by mouth every 8 hours as needed for Mild Pain. 30 Tab 3   • Omega-3 Fatty Acids (FISH OIL) 500 MG Cap TAKE 1 CAPSULE BY MOUTH 1 TIME DAILY 90 Cap 0   • omeprazole (PRILOSEC) 20 MG delayed-release capsule Take 1 Cap by mouth every day. 90 Cap 0   • levothyroxine (SYNTHROID) 137 MCG Tab Take 1 Tab by mouth Every morning on an empty stomach. 30 Tab 5   • polyethylene glycol/lytes (MIRALAX) Pack Take 17 g by mouth every day.     • sennosides (SENOKOT) 8.6 MG Tab Take 8.6 mg by mouth 1 time daily as needed.     • Paliperidone (INVEGA PO) Take  by mouth.     • Simvastatin (ZOCOR PO) Take  by mouth.       No current facility-administered medications for this visit.       Allergies:No Known Allergies    ROS  Constitutional: Denies fevers or chills  Eyes: Denies changes in vision  Ears/Nose/Throat/Mouth: Denies nasal congestion or sore throat   Cardiovascular: Denies chest pain or palpitations   Respiratory: Denies shortness of breath, Denies cough  Gastrointestinal/Hepatic: Denies abd pain, nausea, vomiting   Genitourinary: Denies dysuria or frequency  Musculoskeletal/Rheum: Right toe pain as discussed above  Neurological: Denies headache  Psychiatric: History of Mild cognitive delay  Endocrine: History of hypothyroidism and hirsutism    /90 mmHg  Pulse 89  Temp(Src) 36.4 °C (97.6 °F)  Ht 1.753 m (5' 9\")  Wt 134.888 kg (297 lb 6 oz)  BMI 43.89 kg/m2  SpO2 97%  LMP 05/19/2017    Physical Exam  General: non-toxic apeparnce. NAD. Obese.  HEENT: EOMI. Scleral clear.  CVS: normal S1, S2. NSR. No m/r/g. No " JVD.   PULM: CTABL . No w/r/r. No basilar crackles.   ABD: soft, NT, ND. +BS.   : No suprapubic tenderness. No CVA tenderness.   EXT: no LE edema b/l. No cyanosis.  R foot: Right toe shows no swelling or erythema. Mild pain with movement of the joint. No signs of drainage. Poor nail care with tenderness primarily over overgrown nail. No signs of active fungal infection.  Neuro: No focal deficits.   Pysch: AAOx4  Skin: no rashes.     Assessment and Plan  1. Hypothyroidism, unspecified type  Patient with history of hypothyroidism well-controlled with oral replacement.  We'll repeat labs when sure appropriate control. Patient not sure if she has enough medications at home but will call if she needs refills.  - TSH WITH REFLEX TO FT4; Future  - COMP METABOLIC PANEL; Future  - CBC WITH DIFFERENTIAL; Future  - LIPID PROFILE; Future    2. Pain of toe of right foot  Her symptoms are likely secondary to poor foot care/nail care. It is unlikely that she has active gout or other inflammatory/infectious etiology.   Recommend when necessary NSAIDs and topical remedies as needed for pain. Will place referral for podiatry to help the patient get educated and receive appropriate/nail care.  She was educated to call the clinic if symptoms fail to improve in the next week or so, progress or if there is any involvement of other joints.  - CBC WITH DIFFERENTIAL; Future  - REFERRAL TO PODIATRY    3. Dyslipidemia  4. Morbid obesity due to excess calories (CMS-[facility])  We'll repeat fasting lipid panel.  The patient was counseled on weight loss, healthy dietary choices and routine exercise.  The patient is overweight with signs of hirsutism but no signs of amenorrhea. It is possible that she has PCOS but will defer further investigation to gynecology. In the meantime, we'll screen the patient for diabetes.    5. Tobacco dependence  The patient reports active attempts at smoking cessation. She is not interested in help at this  time.    Follow-up:Return in about 3 months (around 8/25/2017).    Signed by: Ronni Sebastian M.D.

## 2017-05-25 NOTE — MR AVS SNAPSHOT
"        Frances Ardon   2017 1:15 PM   Office Visit   MRN: [id number]    Department:  r Med - Internal Med   Dept Phone:  [phone]    Description:  Female : 1975   Provider:  Ronni Sebastian M.D.           Reason for Visit     Toe Pain Right [place]. Red/Pain x3 days      Allergies as of 2017     No Known Allergies      You were diagnosed with     Hypothyroidism, unspecified type   [9094734]       Pain of toe of right foot   [180632]       Dyslipidemia   [139400]         Vital Signs     Blood Pressure Pulse Temperature Height Weight Body Mass Index    140/90 mmHg 89 36.4 °C (97.6 °F) 1.753 m (5' 9\") 134.888 kg (297 lb 6 oz) 43.89 kg/m2    Oxygen Saturation Last Menstrual Period Smoking Status             97% 2017 Current Every Day Smoker         Basic Information     Date Of Birth Sex Race Ethnicity Preferred Language    1975 Female Unknown Non- English      Problem List              ICD-10-CM Priority Class Noted - Resolved    Constipation K59.00   2016 - Present    Mood disorder (CMS-HCC) F39   2016 - Present    Tobacco dependence F17.200   2016 - Present    Hypothyroidism E03.9   2016 - Present    GERD (gastroesophageal reflux disease) K21.9   2016 - Present    Mental retardation, mild (I.Q. 50-70) F70   2016 - Present    Dyslipidemia E78.5   2016 - Present    History of seizures Z87.898   2016 - Present    Breast lump on left side at 9 o'clock position N63   3/8/2017 - Present    Breast lump on right side at 3 o'clock position N63   3/8/2017 - Present      Health Maintenance        Date Due Completion Dates    IMM PNEUMOCOCCAL 19-64 (ADULT) MEDIUM RISK SERIES (1 of 1 - PPSV23) 1993 ---    MAMMOGRAM 2018    IMM DTaP/Tdap/Td Vaccine (2 - Td) 10/11/2019 10/11/2009    PAP SMEAR 2020, 2017            Current Immunizations     Influenza Vaccine Quad Inj (Pf) 10/5/2012    Influenza Vaccine " Quad Inj (Preserved) 12/28/2016    Tdap Vaccine 10/11/2009      Below and/or attached are the medications your provider expects you to take. Review all of your home medications and newly ordered medications with your provider and/or pharmacist. Follow medication instructions as directed by your provider and/or pharmacist. Please keep your medication list with you and share with your provider. Update the information when medications are discontinued, doses are changed, or new medications (including over-the-counter products) are added; and carry medication information at all times in the event of emergency situations     Allergies:  No Known Allergies          Medications  Valid as of: May 25, 2017 -  1:59 PM    Generic Name Brand Name Tablet Size Instructions for use    Ibuprofen (Tab) MOTRIN 800 MG Take 1 Tab by mouth every 8 hours as needed for Mild Pain.        Levothyroxine Sodium (Tab) SYNTHROID 137 MCG Take 1 Tab by mouth Every morning on an empty stomach.        Omega-3 Fatty Acids (Cap) Fish Oil 500 MG TAKE 1 CAPSULE BY MOUTH 1 TIME DAILY        Omeprazole (CAPSULE DELAYED RELEASE) PRILOSEC 20 MG Take 1 Cap by mouth every day.        Paliperidone   Take  by mouth.        Polyethylene Glycol 3350 (Pack) MIRALAX  Take 17 g by mouth every day.        Sennosides (Tab) SENOKOT 8.6 MG Take 8.6 mg by mouth 1 time daily as needed.        Simvastatin   Take  by mouth.        .                 Medicines prescribed today were sent to:     [facility] [address]    [address] [facility] [address]    Phone: [phone] Fax: [phone]    Open 24 Hours?: No    [facility] [address] [place] [address] (), [place] - [id number] () [place] [address]    Phone: [phone] Fax: [phone]    Open 24 Hours?: No      Medication refill instructions:       If your prescription bottle indicates you have medication refills left, it is not necessary to call your  provider’s office. Please contact your pharmacy and they will refill your medication.    If your prescription bottle indicates you do not have any refills left, you may request refills at any time through one of the following ways: The online [employer] system (except Urgent Care), by calling your provider’s office, or by asking your pharmacy to contact your provider’s office with a refill request. Medication refills are processed only during regular business hours and may not be available until the next business day. Your provider may request additional information or to have a follow-up visit with you prior to refilling your medication.   *Please Note: Medication refills are assigned a new Rx number when refilled electronically. Your pharmacy may indicate that no refills were authorized even though a new prescription for the same medication is available at the pharmacy. Please request the medicine by name with the pharmacy before contacting your provider for a refill.        Your To Do List     Future Labs/Procedures Complete By Expires    CBC WITH DIFFERENTIAL  As directed 5/25/2018    COMP METABOLIC PANEL  As directed 5/25/2018    LIPID PROFILE  As directed 5/25/2018    TSH WITH REFLEX TO FT4  As directed 5/25/2018      Referral     A referral request has been sent to our patient care coordination department. Please allow 3-5 business days for us to process this request and contact you either by phone or mail. If you do not hear from us by the 5th business day, please call us at [phone].           [employer] Status: Patient Declined        Quit Tobacco Information     Do you want to quit using tobacco?    Quitting tobacco decreases risks of cancer, heart and lung disease, increases life expectancy, improves sense of taste and smell, and increases spending money, among other benefits.    If you are thinking about quitting, we can help.  • [facility] Quit Tobacco Program: [phone]  o Program occurs weekly for  four weeks and includes pharmacist consultation on products to support quitting smoking or chewing tobacco. A provider referral is needed for pharmacist consultation.  • Tobacco Users Help Hotline: 8-329-QUIT-NOW (611-5510) or https://[place].quitlogix.org/  o Free, confidential telephone and online coaching for [place] residents. Sessions are designed on a schedule that is convenient for you. Eligible clients receive free nicotine replacement therapy.  • Nationally: www.smokefree.gov  o Information and professional assistance to support both immediate and long-term needs as you become, and remain, a non-smoker. Smokefree.gov allows you to choose the help that best fits your needs.

## 2017-06-02 ENCOUNTER — TELEPHONE (OUTPATIENT)
Dept: OBGYN | Facility: CLINIC | Age: 42
End: 2017-06-02

## 2017-06-02 NOTE — TELEPHONE ENCOUNTER
----- Message from Danita Rivers M.D. sent at 6/1/2017 11:57 AM PDT -----  Normal cytology with positive HPV recommendation is repeat Pap smear in one year  Bacterial vaginosis also noted, if patient is asymptomatic she could have Flagyl 500 by mouth twice a day for 7 days

## 2017-06-02 NOTE — Clinical Note
June 7, 2017          Dear Frances Ardon,      Please call us regarding your care.  One of the nurses is available to speak with you Monday through Friday, 8 a.m. to 5 p.m.    Please call us at [phone]; thank you for your prompt attention.        Sincerely,          BROWN Cunningham    Electronically Signed

## 2017-06-16 ENCOUNTER — HOSPITAL ENCOUNTER (OUTPATIENT)
Dept: LAB | Facility: MEDICAL CENTER | Age: 42
End: 2017-06-16
Attending: STUDENT IN AN ORGANIZED HEALTH CARE EDUCATION/TRAINING PROGRAM
Payer: MEDICARE

## 2017-06-16 DIAGNOSIS — M79.674 PAIN OF TOE OF RIGHT FOOT: ICD-10-CM

## 2017-06-16 DIAGNOSIS — E03.9 HYPOTHYROIDISM, UNSPECIFIED TYPE: ICD-10-CM

## 2017-06-16 LAB
ALBUMIN SERPL BCP-MCNC: 4.3 G/DL (ref 3.2–4.9)
ALBUMIN/GLOB SERPL: 1.3 G/DL
ALP SERPL-CCNC: 91 U/L (ref 30–99)
ALT SERPL-CCNC: 31 U/L (ref 2–50)
ANION GAP SERPL CALC-SCNC: 7 MMOL/L (ref 0–11.9)
AST SERPL-CCNC: 22 U/L (ref 12–45)
BASOPHILS # BLD AUTO: 1.3 % (ref 0–1.8)
BASOPHILS # BLD: 0.11 K/UL (ref 0–0.12)
BILIRUB SERPL-MCNC: 0.6 MG/DL (ref 0.1–1.5)
BUN SERPL-MCNC: 8 MG/DL (ref 8–22)
CALCIUM SERPL-MCNC: 9.3 MG/DL (ref 8.5–10.5)
CHLORIDE SERPL-SCNC: 107 MMOL/L (ref 96–112)
CHOLEST SERPL-MCNC: 197 MG/DL (ref 100–199)
CO2 SERPL-SCNC: 26 MMOL/L (ref 20–33)
CREAT SERPL-MCNC: 0.81 MG/DL (ref 0.5–1.4)
EOSINOPHIL # BLD AUTO: 0.13 K/UL (ref 0–0.51)
EOSINOPHIL NFR BLD: 1.6 % (ref 0–6.9)
ERYTHROCYTE [DISTWIDTH] IN BLOOD BY AUTOMATED COUNT: 45.1 FL (ref 35.9–50)
GFR SERPL CREATININE-BSD FRML MDRD: >60 ML/MIN/1.73 M 2
GLOBULIN SER CALC-MCNC: 3.4 G/DL (ref 1.9–3.5)
GLUCOSE SERPL-MCNC: 92 MG/DL (ref 65–99)
HCT VFR BLD AUTO: 40.8 % (ref 37–47)
HDLC SERPL-MCNC: 40 MG/DL
HGB BLD-MCNC: 13.7 G/DL (ref 12–16)
IMM GRANULOCYTES # BLD AUTO: 0.02 K/UL (ref 0–0.11)
IMM GRANULOCYTES NFR BLD AUTO: 0.2 % (ref 0–0.9)
LDLC SERPL CALC-MCNC: 141 MG/DL
LYMPHOCYTES # BLD AUTO: 2.62 K/UL (ref 1–4.8)
LYMPHOCYTES NFR BLD: 31.8 % (ref 22–41)
MCH RBC QN AUTO: 30.9 PG (ref 27–33)
MCHC RBC AUTO-ENTMCNC: 33.6 G/DL (ref 33.6–35)
MCV RBC AUTO: 92.1 FL (ref 81.4–97.8)
MONOCYTES # BLD AUTO: 0.59 K/UL (ref 0–0.85)
MONOCYTES NFR BLD AUTO: 7.2 % (ref 0–13.4)
NEUTROPHILS # BLD AUTO: 4.77 K/UL (ref 2–7.15)
NEUTROPHILS NFR BLD: 57.9 % (ref 44–72)
NRBC # BLD AUTO: 0 K/UL
NRBC BLD AUTO-RTO: 0 /100 WBC
PLATELET # BLD AUTO: 284 K/UL (ref 164–446)
PMV BLD AUTO: 10.3 FL (ref 9–12.9)
POTASSIUM SERPL-SCNC: 4 MMOL/L (ref 3.6–5.5)
PROT SERPL-MCNC: 7.7 G/DL (ref 6–8.2)
RBC # BLD AUTO: 4.43 M/UL (ref 4.2–5.4)
SODIUM SERPL-SCNC: 140 MMOL/L (ref 135–145)
T4 FREE SERPL-MCNC: 1.01 NG/DL (ref 0.53–1.43)
TRIGL SERPL-MCNC: 81 MG/DL (ref 0–149)
TSH SERPL DL<=0.005 MIU/L-ACNC: 4.59 UIU/ML (ref 0.3–3.7)
WBC # BLD AUTO: 8.2 K/UL (ref 4.8–10.8)

## 2017-06-16 PROCEDURE — 36415 COLL VENOUS BLD VENIPUNCTURE: CPT

## 2017-06-16 PROCEDURE — 84443 ASSAY THYROID STIM HORMONE: CPT

## 2017-06-16 PROCEDURE — 80053 COMPREHEN METABOLIC PANEL: CPT

## 2017-06-16 PROCEDURE — 80061 LIPID PANEL: CPT

## 2017-06-16 PROCEDURE — 84439 ASSAY OF FREE THYROXINE: CPT

## 2017-06-16 PROCEDURE — 85025 COMPLETE CBC W/AUTO DIFF WBC: CPT

## 2017-06-29 RX ORDER — OMEGA-3/DHA/EPA/FISH OIL 60 MG-90MG
CAPSULE ORAL
Qty: 30 CAP | Refills: 0 | Status: SHIPPED | OUTPATIENT
Start: 2017-06-29 | End: 2017-09-05 | Stop reason: SDUPTHER

## 2017-06-29 RX ORDER — OMEPRAZOLE 20 MG/1
CAPSULE, DELAYED RELEASE ORAL
Qty: 30 CAP | Refills: 0 | Status: SHIPPED | OUTPATIENT
Start: 2017-06-29 | End: 2017-08-09 | Stop reason: SDUPTHER

## 2017-06-29 NOTE — TELEPHONE ENCOUNTER
Was the patient seen in the last year in this department? Yes Last seen 5/25/17 Dr Sebastian next appt not scheduled    Does patient have an active prescription for medications requested? No     Received Request Via: Pharmacy

## 2017-08-09 RX ORDER — OMEPRAZOLE 20 MG/1
CAPSULE, DELAYED RELEASE ORAL
Qty: 30 CAP | Refills: 0 | Status: SHIPPED | OUTPATIENT
Start: 2017-08-09 | End: 2017-09-05 | Stop reason: SDUPTHER

## 2017-08-09 NOTE — TELEPHONE ENCOUNTER
Last seen: 05/25/17 by Dr. Sebastian  Next appt: None     Was the patient seen in the last year in this department? Yes   Does patient have an active prescription for medications requested? No   Received Request Via: Pharmacy

## 2017-09-05 RX ORDER — OMEGA-3/DHA/EPA/FISH OIL 60 MG-90MG
1 CAPSULE ORAL DAILY
Qty: 30 CAP | Refills: 0 | Status: SHIPPED | OUTPATIENT
Start: 2017-09-05 | End: 2017-12-06 | Stop reason: SDUPTHER

## 2017-09-05 RX ORDER — OMEPRAZOLE 20 MG/1
CAPSULE, DELAYED RELEASE ORAL
Qty: 30 CAP | Refills: 0 | Status: SHIPPED | OUTPATIENT
Start: 2017-09-05 | End: 2017-12-06 | Stop reason: SDUPTHER

## 2017-09-05 RX ORDER — POLYETHYLENE GLYCOL 3350 17 G/17G
17 POWDER, FOR SOLUTION ORAL DAILY
Qty: 30 EACH | Refills: 0 | Status: SHIPPED | OUTPATIENT
Start: 2017-09-05 | End: 2018-02-27 | Stop reason: SDUPTHER

## 2017-09-05 NOTE — TELEPHONE ENCOUNTER
Was the patient seen in the last year in this department? Yes    Last seen: 05/25/17 by Dr. Sebastian  Next appt: NONE      Does patient have an active prescription for medications requested? No     Received Request Via: Pharmacy

## 2017-11-17 ENCOUNTER — OFFICE VISIT (OUTPATIENT)
Dept: INTERNAL MEDICINE | Facility: MEDICAL CENTER | Age: 42
End: 2017-11-17
Payer: MEDICARE

## 2017-11-17 VITALS
BODY MASS INDEX: 43.4 KG/M2 | HEIGHT: 69 IN | OXYGEN SATURATION: 93 % | HEART RATE: 86 BPM | DIASTOLIC BLOOD PRESSURE: 80 MMHG | SYSTOLIC BLOOD PRESSURE: 120 MMHG | TEMPERATURE: 96.5 F | WEIGHT: 293 LBS

## 2017-11-17 DIAGNOSIS — E78.5 DYSLIPIDEMIA: ICD-10-CM

## 2017-11-17 DIAGNOSIS — E03.9 HYPOTHYROIDISM, UNSPECIFIED TYPE: ICD-10-CM

## 2017-11-17 DIAGNOSIS — F70 MENTAL RETARDATION, MILD (I.Q. 50-70): ICD-10-CM

## 2017-11-17 DIAGNOSIS — Z87.898 HISTORY OF SEIZURES: ICD-10-CM

## 2017-11-17 DIAGNOSIS — F17.200 TOBACCO DEPENDENCE: ICD-10-CM

## 2017-11-17 DIAGNOSIS — E66.01 MORBID OBESITY (HCC): ICD-10-CM

## 2017-11-17 DIAGNOSIS — K21.9 GASTROESOPHAGEAL REFLUX DISEASE, ESOPHAGITIS PRESENCE NOT SPECIFIED: ICD-10-CM

## 2017-11-17 DIAGNOSIS — Z23 ENCOUNTER FOR IMMUNIZATION: ICD-10-CM

## 2017-11-17 DIAGNOSIS — F39 MOOD DISORDER (HCC): ICD-10-CM

## 2017-11-17 DIAGNOSIS — Z00.00 PREVENTATIVE HEALTH CARE: ICD-10-CM

## 2017-11-17 PROCEDURE — 90686 IIV4 VACC NO PRSV 0.5 ML IM: CPT | Performed by: INTERNAL MEDICINE

## 2017-11-17 PROCEDURE — G0008 ADMIN INFLUENZA VIRUS VAC: HCPCS | Performed by: INTERNAL MEDICINE

## 2017-11-17 PROCEDURE — 99214 OFFICE O/P EST MOD 30 MIN: CPT | Mod: GC,25 | Performed by: INTERNAL MEDICINE

## 2017-11-17 RX ORDER — NICOTINE 21 MG/24HR
1 PATCH, TRANSDERMAL 24 HOURS TRANSDERMAL EVERY 24 HOURS
Qty: 56 PATCH | Refills: 0 | Status: SHIPPED | OUTPATIENT
Start: 2017-11-17 | End: 2018-11-19 | Stop reason: SDUPTHER

## 2017-11-17 RX ORDER — NICOTINE 21 MG/24HR
1 PATCH, TRANSDERMAL 24 HOURS TRANSDERMAL EVERY 24 HOURS
Qty: 42 PATCH | Refills: 0 | Status: SHIPPED | OUTPATIENT
Start: 2017-11-17 | End: 2018-01-29 | Stop reason: SDUPTHER

## 2017-11-17 RX ORDER — OLOPATADINE HYDROCHLORIDE 1 MG/ML
1 SOLUTION/ DROPS OPHTHALMIC 2 TIMES DAILY
COMMUNITY
End: 2022-04-18

## 2017-11-17 RX ORDER — IBUPROFEN 600 MG/1
600 TABLET ORAL EVERY 6 HOURS PRN
COMMUNITY
End: 2018-05-30

## 2017-11-17 RX ORDER — LEVOTHYROXINE SODIUM 0.05 MG/1
50 TABLET ORAL
COMMUNITY
End: 2018-06-08 | Stop reason: SDUPTHER

## 2017-11-17 NOTE — PROGRESS NOTES
Established Patient    Frances presents today with the following:    CC: follow up for hypothyroid, obesity, preventative care    HPI: Ms. Ardon is a 41-year-old female with a past medical history significant for mild developmental delay, mood disorder, hypothyroidism presents to the clinic for routine follow-up visit. She lives in [address] in [place], [place]. She quit school after about 9-10 grade.     Patient states she has no complaints or new symptoms at this time. Denies symptoms of hypothyroidism. States her ingrown toenail has been clipped and monitored at the group home, and she no longer has pain or discomfort. States she eats meals prepared for everyone at group home, currently low carb/low sugar to do other house mate with these requirements and all food is made at the same time. Has daily snacks including crackers when she gets home each day. Occasional sweets including ice cream. Also states she continues to smoke 1 PPD but wants to quit. Denies seizures since childhood. State her mood is stable and that she is overall doing well.      Patient Active Problem List    Diagnosis Date Noted   • Morbid obesity (CMS-[facility]) 05/25/2017   • Breast lump on left side at 9 o'clock position 03/08/2017   • Breast lump on right side at 3 o'clock position 03/08/2017   • Constipation 12/27/2016   • Mood disorder (CMS-[facility]) 12/27/2016   • Tobacco dependence 12/27/2016   • Hypothyroidism 12/27/2016   • GERD (gastroesophageal reflux disease) 12/27/2016   • Mental retardation, mild (I.Q. 50-70) 12/27/2016   • Dyslipidemia 12/27/2016   • History of seizures 12/27/2016       Current Outpatient Prescriptions   Medication Sig Dispense Refill   • Magnesium Sulfate, Laxative, (EPSOM SALT PO) Take  by mouth.     • ibuprofen (MOTRIN) 600 MG Tab Take 600 mg by mouth every 6 hours as needed.     • levothyroxine (SYNTHROID) 50 MCG Tab Take 50 mcg by mouth Every morning on an empty stomach.     • olopatadine (PATANOL) 0.1 %  "ophthalmic solution Place 1 Drop in both eyes 2 times a day.     • omeprazole (PRILOSEC) 20 MG delayed-release capsule TAKE 1 CAPSULE BY MOUTH 1 TIME DAILY 30 Cap 0   • polyethylene glycol/lytes (MIRALAX) Pack Take 1 Packet by mouth every day. 30 Each 0   • Omega-3 Fatty Acids (FISH OIL) 500 MG Cap Take 1 Cap by mouth every day. 30 Cap 0   • Paliperidone (INVEGA PO) Take  by mouth.     • ibuprofen (MOTRIN) 800 MG Tab Take 1 Tab by mouth every 8 hours as needed for Mild Pain. 30 Tab 3   • levothyroxine (SYNTHROID) 137 MCG Tab Take 1 Tab by mouth Every morning on an empty stomach. 30 Tab 5   • sennosides (SENOKOT) 8.6 MG Tab Take 8.6 mg by mouth 1 time daily as needed.     • Simvastatin (ZOCOR PO) Take  by mouth.       No current facility-administered medications for this visit.        ROS: As per HPI. Additional pertinent symptoms as noted below.    GENERAL:  No weight change, change in appetite, thirst, fever or chills.    HEENT:  No headache or blurred vision.    CARDIOPULMONARY:  No chest pain, palpitations or shortness of breath.    GASTROINTESTINAL:  No anorexia, nausea or vomiting.    GENITOURINARY:  No dysuria or pyuria.    ENDOCRINE:  No goiter, lethargy or heat/cold intolerance.    HEMATOLOGY/ONCOLOGY:  No pallor, bruising or bleeding.    MUSCULOSKELETAL:  No change in strength. No swelling.    NEUROLOGIC:  No headache or loss of consciousness.    PSYCHIATRIC:  No change in personality, affect or depression.    /80   Pulse 86   Temp 35.8 °C (96.5 °F)   Ht 1.753 m (5' 9\")   Wt (!) 134.5 kg (296 lb 9.6 oz)   SpO2 93%   BMI 43.80 kg/m²     Physical Exam   Constitutional:  oriented to person, place, and time. No distress.   Eyes: Pupils are equal, round, and reactive to light. No scleral icterus.  Neck: Neck supple. No thyromegaly present.   Cardiovascular: Normal rate, regular rhythm and normal heart sounds.  Exam reveals no gallop and no friction rub.  No murmur heard.  Pulmonary/Chest: Breath " sounds normal. Chest wall is not dull to percussion.   Musculoskeletal:   no edema.   Lymphadenopathy: no cervical adenopathy  Neurological: alert and oriented to person, place, and time.   Skin: No cyanosis. Nails show no clubbing.    Note: I have reviewed all pertinent labs and diagnostic tests associated with this visit with specific comments listed under the assessment and plan below    Assessment and Plan    1. Preventative health care  High risk non-16 non-18 HPV positive 5/23/17, PAP negative. Patient reported history of breast lumps during visit 3/2017 which were not found on exam mammogram normal 4/4/17.  - Flu Quad Inj >3 Year Pre-Filled PF  - Cytology and HPV at 1 year (5/23/18)    2. Encounter for immunization   Has not had influenza vaccination this season, denies current illness or previous allergy.  - Flu Quad Inj >3 Year Pre-Filled PF    3. Morbid obesity (CMS-HCC)  Excess calories per HPI. Lifestyle modifications difficult in patient with developmental delay living in group home. Patient states she is agreeable to cut out snacks and to walk more for exercise.  -  directed to stop between-meal snacks and to continue 3 meals a day of low carbs and high fruits/vegetables  - Patient agrees to exercise by walking daily    4. Dyslipidemia  , however ASCVD 3.8%. Fasting blood glucose 93. No statin therapy indicated at this time.  - Lipid panel at 1 year    5. Gastroesophageal reflux disease, esophagitis presence not specified  History of GERD, denies alarm symptoms, appears well controlled on OTC PPI.  - Continue omeprazole 20 mg daily    6. Mood disorder (CMS-HCC)  7. Mental retardation, mild (I.Q. 50-70)  States mood is stable and patient is satisfied with current medication. States she gets along with others at group home, able to perform daily activities including some work.  - Continue paliperidone  - Continue to monitor    8. Hypothyroidism, unspecified type  Denies symptoms of  hypothyroidism. TFTs within goal on current regimen.  - Goal TSH 0.5-5.0  - Continue levothyroxine 137 mcg daily    9. Tobacco dependence  1 PPD, wants to quit.  - NRT gum 4mg as directed  - If gum fails, NRT transdermal 21 mg initial, taper to 14 mg as directed    10. History of seizures  No seizures since childhood, not on antiepileptics.  - Continue to monitor    Followup: Return in about 3 months (around 2/17/2018).      Signed by: Cong Perez M.D.

## 2017-11-17 NOTE — NON-PROVIDER
Frances Ardon is a 42 y.o. female here for a non-provider visit for:   FLU    Reason for immunization: Annual Flu Vaccine  Immunization records indicate need for vaccine: Yes, confirmed with Epic  Minimum interval has been met for this vaccine: Yes  ABN completed: Not Indicated    Order and dose verified by: RG  VIS Dated  08/07/15 was given to patient: Yes  IAC Questionnaire abnormal. Questionnaire reviewed and administration of injection approved by provider: Dr. Perez    Patient tolerated injection and no adverse effects were observed or reported: Yes    Pt scheduled for next dose in series: Not Indicated

## 2017-12-06 NOTE — TELEPHONE ENCOUNTER
Last seen: 11/17/17 by Dr. Perez  Next appt: 02/27/18 with Dr. Perez    Was the patient seen in the last year in this department? Yes   Does patient have an active prescription for medications requested? No   Received Request Via: Pharmacy

## 2017-12-13 RX ORDER — OMEGA-3/DHA/EPA/FISH OIL 60 MG-90MG
1 CAPSULE ORAL DAILY
Qty: 30 CAP | Refills: 11 | Status: SHIPPED | OUTPATIENT
Start: 2017-12-13 | End: 2018-11-19 | Stop reason: SDUPTHER

## 2017-12-13 RX ORDER — OMEPRAZOLE 20 MG/1
CAPSULE, DELAYED RELEASE ORAL
Qty: 30 CAP | Refills: 2 | Status: SHIPPED | OUTPATIENT
Start: 2017-12-13 | End: 2018-02-28 | Stop reason: SDUPTHER

## 2018-01-29 DIAGNOSIS — F17.200 TOBACCO DEPENDENCE: ICD-10-CM

## 2018-01-29 RX ORDER — NICOTINE 21 MG/24HR
1 PATCH, TRANSDERMAL 24 HOURS TRANSDERMAL EVERY 24 HOURS
Qty: 42 PATCH | Refills: 0 | Status: SHIPPED | OUTPATIENT
Start: 2018-01-29 | End: 2019-03-20

## 2018-02-27 RX ORDER — POLYETHYLENE GLYCOL 3350 17 G/17G
17 POWDER, FOR SOLUTION ORAL DAILY
Qty: 30 EACH | Refills: 0 | Status: SHIPPED | OUTPATIENT
Start: 2018-02-27 | End: 2018-03-27 | Stop reason: SDUPTHER

## 2018-02-27 NOTE — TELEPHONE ENCOUNTER
Last seen: 11/17/17 by Dr. Perez  Next appt: None     Was the patient seen in the last year in this department? Yes   Does patient have an active prescription for medications requested? No   Received Request Via: Pharmacy

## 2018-02-28 RX ORDER — OMEPRAZOLE 20 MG/1
CAPSULE, DELAYED RELEASE ORAL
Qty: 90 CAP | Refills: 0 | Status: SHIPPED | OUTPATIENT
Start: 2018-02-28 | End: 2018-05-30 | Stop reason: SDUPTHER

## 2018-03-27 RX ORDER — POLYETHYLENE GLYCOL 3350 17 G/17G
17 POWDER, FOR SOLUTION ORAL DAILY
Qty: 90 EACH | Refills: 0 | Status: SHIPPED | OUTPATIENT
Start: 2018-03-27 | End: 2018-06-27 | Stop reason: SDUPTHER

## 2018-05-30 DIAGNOSIS — M25.572 LEFT ANKLE PAIN, UNSPECIFIED CHRONICITY: ICD-10-CM

## 2018-05-30 RX ORDER — OMEPRAZOLE 20 MG/1
CAPSULE, DELAYED RELEASE ORAL
Qty: 90 CAP | Refills: 0 | Status: SHIPPED | OUTPATIENT
Start: 2018-05-30 | End: 2018-08-28 | Stop reason: SDUPTHER

## 2018-05-30 RX ORDER — IBUPROFEN 800 MG/1
800 TABLET ORAL EVERY 8 HOURS PRN
Qty: 30 TAB | Refills: 0 | Status: SHIPPED | OUTPATIENT
Start: 2018-05-30 | End: 2018-10-23 | Stop reason: SDUPTHER

## 2018-06-08 RX ORDER — LORATADINE 10 MG/1
10 TABLET ORAL
Refills: 0 | COMMUNITY
Start: 2018-03-27 | End: 2022-07-26

## 2018-06-08 RX ORDER — LEVOTHYROXINE SODIUM 0.05 MG/1
50 TABLET ORAL
Qty: 30 TAB | Refills: 0 | Status: SHIPPED | OUTPATIENT
Start: 2018-06-08 | End: 2018-06-25 | Stop reason: SDUPTHER

## 2018-06-26 RX ORDER — LEVOTHYROXINE SODIUM 0.05 MG/1
50 TABLET ORAL
Qty: 30 TAB | Refills: 0 | Status: SHIPPED | OUTPATIENT
Start: 2018-06-26 | End: 2018-08-07 | Stop reason: SDUPTHER

## 2018-06-27 RX ORDER — POLYETHYLENE GLYCOL 3350 17 G/17G
17 POWDER, FOR SOLUTION ORAL DAILY
Qty: 90 EACH | Refills: 0 | Status: SHIPPED | OUTPATIENT
Start: 2018-06-27 | End: 2018-11-21 | Stop reason: SDUPTHER

## 2018-06-27 NOTE — TELEPHONE ENCOUNTER
Last seen: 11/117/17 by Dr. Perez  Next appt: None     Was the patient seen in the last year in this department? Yes   Does patient have an active prescription for medications requested? No   Received Request Via: Pharmacy

## 2018-08-03 ENCOUNTER — TELEPHONE (OUTPATIENT)
Dept: INTERNAL MEDICINE | Facility: MEDICAL CENTER | Age: 43
End: 2018-08-03

## 2018-08-03 NOTE — TELEPHONE ENCOUNTER
PT SEEN: 11/17/17 WITH Dr. Perez NEXT APPT None  Was the patient seen in the last year in this department? Yes     Does patient have an active prescription for medications requested? No     Received Request Via: Pharmacy     Per pt pharmacy representative Ariadna Sullivan from [facility] called and spoke to me 8/3/18 regarding pt RX refill request for her Levothyroxine and would like for Dr to please send over approval for the refill.    Thank you!

## 2018-08-07 RX ORDER — LEVOTHYROXINE SODIUM 0.05 MG/1
50 TABLET ORAL
Qty: 30 TAB | Refills: 5 | Status: SHIPPED | OUTPATIENT
Start: 2018-08-07 | End: 2019-01-31 | Stop reason: SDUPTHER

## 2018-08-07 NOTE — TELEPHONE ENCOUNTER
/PT SEEN: 11/17/17 WITH Dr. Perez NEXT APPT None   Was the patient seen in the last year in this department? Yes     Does patient have an active prescription for medications requested? No     Received Request Via: Pharmacy

## 2018-08-15 ENCOUNTER — OFFICE VISIT (OUTPATIENT)
Dept: INTERNAL MEDICINE | Facility: MEDICAL CENTER | Age: 43
End: 2018-08-15
Payer: MEDICARE

## 2018-08-15 VITALS
OXYGEN SATURATION: 95 % | BODY MASS INDEX: 43.1 KG/M2 | WEIGHT: 291 LBS | HEIGHT: 69 IN | HEART RATE: 70 BPM | SYSTOLIC BLOOD PRESSURE: 124 MMHG | DIASTOLIC BLOOD PRESSURE: 88 MMHG | TEMPERATURE: 98.1 F

## 2018-08-15 DIAGNOSIS — F17.200 TOBACCO DEPENDENCE: ICD-10-CM

## 2018-08-15 DIAGNOSIS — Z12.31 ENCOUNTER FOR SCREENING MAMMOGRAM FOR MALIGNANT NEOPLASM OF BREAST: ICD-10-CM

## 2018-08-15 DIAGNOSIS — L91.8 SKIN TAG: ICD-10-CM

## 2018-08-15 DIAGNOSIS — E03.9 HYPOTHYROIDISM, UNSPECIFIED TYPE: ICD-10-CM

## 2018-08-15 DIAGNOSIS — E78.5 DYSLIPIDEMIA: ICD-10-CM

## 2018-08-15 PROCEDURE — 99213 OFFICE O/P EST LOW 20 MIN: CPT | Mod: GE | Performed by: INTERNAL MEDICINE

## 2018-08-15 ASSESSMENT — PATIENT HEALTH QUESTIONNAIRE - PHQ9: CLINICAL INTERPRETATION OF PHQ2 SCORE: 0

## 2018-08-15 NOTE — PROGRESS NOTES
Established Patient    Frances Ardon is a 42 y.o. female who presents today with the following:    CC: Skin tag    HPI: Here for acute visit for skin tag.  Patient has mild mental retardation and has been living in group home.  She is accompanied by her caregiver from group home.  Patient reports that she noticed a skin tag under her left arm a while ago which did not increase in size but seems to have pain occasionally, no changes in color of the skin.  She denies any changes in her appetite or weight.  She does not exercise particularly but does walk around the group home.  Per chart review, she is due for mammogram and Pap smear.  Pap smear done in May 2017 by gynecologist Dr. Danita Rivers showed no dysplastic changes but positive for HPV and was recommended to repeat one in a year.  Patient reports regular menstrual periods without cramps or heavy bleeding.  She reports compliance with her medications including thyroid meds.  She is still smoking cigarettes and states that nicotine patch does not work for her.    Patient Active Problem List    Diagnosis Date Noted   • Morbid obesity (HCC) 05/25/2017   • Constipation 12/27/2016   • Mood disorder (HCC) 12/27/2016   • Tobacco dependence 12/27/2016   • Hypothyroidism 12/27/2016   • GERD (gastroesophageal reflux disease) 12/27/2016   • Mental retardation, mild (I.Q. 50-70) 12/27/2016   • Dyslipidemia 12/27/2016   • History of seizures 12/27/2016       Current Outpatient Prescriptions   Medication Sig Dispense Refill   • levothyroxine (SYNTHROID) 50 MCG Tab Take 1 Tab by mouth Every morning on an empty stomach. 30 Tab 5   • polyethylene glycol/lytes (MIRALAX) Pack Take 1 Packet by mouth every day. 90 Each 0   • loratadine (CLARITIN) 10 MG Tab Take 10 mg by mouth every day. TAKE 1 TAB BY MOUTH EVERY DAY.  0   • omeprazole (PRILOSEC) 20 MG delayed-release capsule TAKE 1 CAPSULE BY MOUTH 1 TIME DAILY 90 Cap 0   • ibuprofen (MOTRIN) 800 MG Tab Take 1 Tab by  "mouth every 8 hours as needed for Mild Pain. 30 Tab 0   • nicotine (NICODERM) 21 MG/24HR PATCH 24 HR Apply 1 Patch to skin as directed every 24 hours. 42 Patch 0   • Omega-3 Fatty Acids (FISH OIL) 500 MG Cap Take 1 Cap by mouth every day. 30 Cap 11   • Magnesium Sulfate, Laxative, (EPSOM SALT PO) Take  by mouth.     • olopatadine (PATANOL) 0.1 % ophthalmic solution Place 1 Drop in both eyes 2 times a day.     • nicotine polacrilex (NICORETTE) 4 MG gum Take 4 mg by mouth.     • nicotine polacrilex (NICORETTE) 4 MG gum Take 4 mg by mouth every 4 hours. 270 Each 3   • nicotine (NICODERM) 14 MG/24HR PATCH 24 HR Apply 1 Patch to skin as directed every 24 hours. 56 Patch 0   • sennosides (SENOKOT) 8.6 MG Tab Take 8.6 mg by mouth 1 time daily as needed.     • Paliperidone (INVEGA PO) Take  by mouth.     • Simvastatin (ZOCOR PO) Take  by mouth.       No current facility-administered medications for this visit.        ROS: As per HPI. Additional pertinent symptoms as noted below:     Patient denies chest pain, dyspnea, orthopnea, PND, edema, cough, fever, chills, nausea / vomiting, abdominal pain, dysuria, changes in appetite or weight, diarrhea / constipation, headache, tingling / numbness sensation, weakness, visual changes.       /88   Pulse 70   Temp 36.7 °C (98.1 °F)   Ht 1.753 m (5' 9\")   Wt (!) 132 kg (291 lb)   SpO2 95%   BMI 42.97 kg/m²     Physical Exam   Constitutional:  oriented to person, place, and time. No distress.   Eyes: Pupils are equal, round, and reactive to light. No scleral icterus.  Neck: Neck supple. No thyromegaly present.   Cardiovascular: Normal rate, regular rhythm and normal heart sounds.  Exam reveals no gallop and no friction rub.  No murmur heard.  Pulmonary/Chest: Breath sounds normal. Chest wall is not dull to percussion.   Musculoskeletal:   no edema.   Lymphadenopathy: no cervical adenopathy  Neurological: alert and oriented to person, place, and time.   Skin: No cyanosis. " Nails show no clubbing.        Assessment and Plan    1. Skin tag  -     REFERRAL TO DERMATOLOGY      2. Hypothyroidism, unspecified type   On levothyroxine 50 MCG Daily.  We will repeat thyroid function tests.  -     TSH WITH REFLEX TO FT4; Future  -     CBC WITH DIFFERENTIAL; Future  -     COMP METABOLIC PANEL; Future      3. Dyslipidemia Pap smear and then on  6/2017: , TG 81, HDL 40, .    10 year ASCVD risk 4.1%.    Will repeat lipid profile.  -     LIPID PROFILE; Future      4. Encounter for screening mammogram for malignant neoplasm of breast   -     MA-MAMMO SCREENING BILAT W/ELIZABETH W/CAD; Future      5. Tobacco dependence  Smoking cessation has been counseled patient states that she will try to cut down on the number of cigarettes.  Nicotine patch does not work for her.      6.  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 Shift in vaginal kaylan suggestive of bacterial vaginosis.  Need to follow up with gynecologist Dr. Danita Rivers and have repeat Pap smear.           Followup: Follow-up with primary care doctor for annual physical.    Signed by: Gennaro Lundberg M.D.

## 2018-08-28 NOTE — TELEPHONE ENCOUNTER
Last seen: 08/15/18 by Dr. Lundberg  Next appt: 10/29/18 with Dr. Perez    Was the patient seen in the last year in this department? Yes   Does patient have an active prescription for medications requested? No   Received Request Via: Pharmacy

## 2018-08-30 RX ORDER — OMEPRAZOLE 20 MG/1
CAPSULE, DELAYED RELEASE ORAL
Qty: 90 CAP | Refills: 0 | Status: SHIPPED | OUTPATIENT
Start: 2018-08-30 | End: 2018-11-19 | Stop reason: SDUPTHER

## 2018-09-06 ENCOUNTER — HOSPITAL ENCOUNTER (OUTPATIENT)
Dept: LAB | Facility: MEDICAL CENTER | Age: 43
End: 2018-09-06
Attending: STUDENT IN AN ORGANIZED HEALTH CARE EDUCATION/TRAINING PROGRAM
Payer: MEDICARE

## 2018-09-06 DIAGNOSIS — E03.9 HYPOTHYROIDISM, UNSPECIFIED TYPE: ICD-10-CM

## 2018-09-06 DIAGNOSIS — E78.5 DYSLIPIDEMIA: ICD-10-CM

## 2018-09-06 LAB
ALBUMIN SERPL BCP-MCNC: 4.3 G/DL (ref 3.2–4.9)
ALBUMIN/GLOB SERPL: 1.4 G/DL
ALP SERPL-CCNC: 78 U/L (ref 30–99)
ALT SERPL-CCNC: 34 U/L (ref 2–50)
ANION GAP SERPL CALC-SCNC: 8 MMOL/L (ref 0–11.9)
AST SERPL-CCNC: 23 U/L (ref 12–45)
BASOPHILS # BLD AUTO: 1 % (ref 0–1.8)
BASOPHILS # BLD: 0.08 K/UL (ref 0–0.12)
BILIRUB SERPL-MCNC: 0.7 MG/DL (ref 0.1–1.5)
BUN SERPL-MCNC: 7 MG/DL (ref 8–22)
CALCIUM SERPL-MCNC: 9.3 MG/DL (ref 8.5–10.5)
CHLORIDE SERPL-SCNC: 107 MMOL/L (ref 96–112)
CHOLEST SERPL-MCNC: 205 MG/DL (ref 100–199)
CO2 SERPL-SCNC: 26 MMOL/L (ref 20–33)
CREAT SERPL-MCNC: 0.88 MG/DL (ref 0.5–1.4)
EOSINOPHIL # BLD AUTO: 0.12 K/UL (ref 0–0.51)
EOSINOPHIL NFR BLD: 1.4 % (ref 0–6.9)
ERYTHROCYTE [DISTWIDTH] IN BLOOD BY AUTOMATED COUNT: 46.5 FL (ref 35.9–50)
FASTING STATUS PATIENT QL REPORTED: NORMAL
GLOBULIN SER CALC-MCNC: 3.1 G/DL (ref 1.9–3.5)
GLUCOSE SERPL-MCNC: 88 MG/DL (ref 65–99)
HCT VFR BLD AUTO: 41.2 % (ref 37–47)
HDLC SERPL-MCNC: 38 MG/DL
HGB BLD-MCNC: 13.2 G/DL (ref 12–16)
IMM GRANULOCYTES # BLD AUTO: 0.02 K/UL (ref 0–0.11)
IMM GRANULOCYTES NFR BLD AUTO: 0.2 % (ref 0–0.9)
LDLC SERPL CALC-MCNC: 141 MG/DL
LYMPHOCYTES # BLD AUTO: 2.71 K/UL (ref 1–4.8)
LYMPHOCYTES NFR BLD: 32.5 % (ref 22–41)
MCH RBC QN AUTO: 30.5 PG (ref 27–33)
MCHC RBC AUTO-ENTMCNC: 32 G/DL (ref 33.6–35)
MCV RBC AUTO: 95.2 FL (ref 81.4–97.8)
MONOCYTES # BLD AUTO: 0.52 K/UL (ref 0–0.85)
MONOCYTES NFR BLD AUTO: 6.2 % (ref 0–13.4)
NEUTROPHILS # BLD AUTO: 4.89 K/UL (ref 2–7.15)
NEUTROPHILS NFR BLD: 58.7 % (ref 44–72)
NRBC # BLD AUTO: 0 K/UL
NRBC BLD-RTO: 0 /100 WBC
PLATELET # BLD AUTO: 276 K/UL (ref 164–446)
PMV BLD AUTO: 10.8 FL (ref 9–12.9)
POTASSIUM SERPL-SCNC: 3.9 MMOL/L (ref 3.6–5.5)
PROT SERPL-MCNC: 7.4 G/DL (ref 6–8.2)
RBC # BLD AUTO: 4.33 M/UL (ref 4.2–5.4)
SODIUM SERPL-SCNC: 141 MMOL/L (ref 135–145)
TRIGL SERPL-MCNC: 131 MG/DL (ref 0–149)
TSH SERPL DL<=0.005 MIU/L-ACNC: 3.29 UIU/ML (ref 0.38–5.33)
WBC # BLD AUTO: 8.3 K/UL (ref 4.8–10.8)

## 2018-09-06 PROCEDURE — 80061 LIPID PANEL: CPT

## 2018-09-06 PROCEDURE — 84443 ASSAY THYROID STIM HORMONE: CPT

## 2018-09-06 PROCEDURE — 80053 COMPREHEN METABOLIC PANEL: CPT

## 2018-09-06 PROCEDURE — 36415 COLL VENOUS BLD VENIPUNCTURE: CPT

## 2018-09-06 PROCEDURE — 85025 COMPLETE CBC W/AUTO DIFF WBC: CPT

## 2018-09-07 DIAGNOSIS — E78.5 DYSLIPIDEMIA: ICD-10-CM

## 2018-09-07 RX ORDER — ATORVASTATIN CALCIUM 20 MG/1
20 TABLET, FILM COATED ORAL
Qty: 90 TAB | Refills: 1 | Status: SHIPPED | OUTPATIENT
Start: 2018-09-07 | End: 2019-01-22 | Stop reason: SDUPTHER

## 2018-09-27 ENCOUNTER — APPOINTMENT (OUTPATIENT)
Dept: RADIOLOGY | Facility: MEDICAL CENTER | Age: 43
End: 2018-09-27
Attending: STUDENT IN AN ORGANIZED HEALTH CARE EDUCATION/TRAINING PROGRAM
Payer: MEDICARE

## 2018-10-02 ENCOUNTER — HOSPITAL ENCOUNTER (OUTPATIENT)
Facility: MEDICAL CENTER | Age: 43
End: 2018-10-02
Attending: OBSTETRICS & GYNECOLOGY
Payer: MEDICARE

## 2018-10-02 ENCOUNTER — GYNECOLOGY VISIT (OUTPATIENT)
Dept: OBGYN | Facility: CLINIC | Age: 43
End: 2018-10-02
Payer: MEDICARE

## 2018-10-02 VITALS
WEIGHT: 283 LBS | BODY MASS INDEX: 40.52 KG/M2 | HEIGHT: 70 IN | SYSTOLIC BLOOD PRESSURE: 122 MMHG | DIASTOLIC BLOOD PRESSURE: 76 MMHG

## 2018-10-02 DIAGNOSIS — Z12.4 SCREENING FOR CERVICAL CANCER: ICD-10-CM

## 2018-10-02 DIAGNOSIS — R30.0 DYSURIA: ICD-10-CM

## 2018-10-02 DIAGNOSIS — Z01.419 WELL WOMAN EXAM: ICD-10-CM

## 2018-10-02 DIAGNOSIS — Z11.51 SCREENING FOR HPV (HUMAN PAPILLOMAVIRUS): ICD-10-CM

## 2018-10-02 PROCEDURE — 88175 CYTOPATH C/V AUTO FLUID REDO: CPT

## 2018-10-02 PROCEDURE — G0101 CA SCREEN;PELVIC/BREAST EXAM: HCPCS | Performed by: OBSTETRICS & GYNECOLOGY

## 2018-10-02 PROCEDURE — 87624 HPV HI-RISK TYP POOLED RSLT: CPT

## 2018-10-02 NOTE — PROGRESS NOTES
Well woman visit     Frances Ardon is a 42 y.o.  who presents for her Annual Exam.  Today she has symptoms of burning with urination.  Unsure how long she has had this - states it comes and goes but she is not sure for how long.  She doesn't think she is urinating more often than usual and denies an odor to her urine nor seeing any blood in her urine.  Otherwise she denies complaints today.  She denies abnormal vaginal discharge, itching, bowel s/s, or other complaints today.    GYN History:  Doesn't recall how old she was at menarche .  Menses occur monthly, unsure how long they last but estimates they last about a week, not particularly heavy or painful.  No h/o abnormal pap cytology but was HPV positive last year, no history of cone biopsy, LEEP or any other cervical, uterine or gynecologic surgery. No history of sexually transmitted diseases.  Not currently sexually active - does not want any contraception.       OB history:  G0    HEALTH MAINTENANCE:  Last mammogram: 8/15/18  Last colorectal screening: n/a  Immunizations: patient reports she is up to date  Cholesterol screenin      Review of Systems:   Pertinent positives documented in HPI and all other systems reviewed & are negative.    All PMH, PSH, allergies, social history and FH reviewed and updated today:  Past Medical History:   Diagnosis Date   • Arthritis    • Asthma    • Dyslipidemia 2016   • GERD (gastroesophageal reflux disease) 2016   • History of seizures 2016   • Hypertension    • Hypothyroidism 2016   • Mental retardation, mild (I.Q. 50-70) 2016   • Tobacco dependence 2016       No past surgical history on file.    Medications:   Current Outpatient Prescriptions Ordered in [facility]   Medication Sig Dispense Refill   • atorvastatin (LIPITOR) 20 MG Tab Take 1 Tab by mouth every bedtime. 90 Tab 1   • omeprazole (PRILOSEC) 20 MG delayed-release capsule TAKE 1 CAPSULE BY MOUTH 1 TIME DAILY 90 Cap 0  "  • levothyroxine (SYNTHROID) 50 MCG Tab Take 1 Tab by mouth Every morning on an empty stomach. 30 Tab 5   • polyethylene glycol/lytes (MIRALAX) Pack Take 1 Packet by mouth every day. 90 Each 0   • loratadine (CLARITIN) 10 MG Tab Take 10 mg by mouth every day. TAKE 1 TAB BY MOUTH EVERY DAY.  0   • ibuprofen (MOTRIN) 800 MG Tab Take 1 Tab by mouth every 8 hours as needed for Mild Pain. 30 Tab 0   • nicotine (NICODERM) 21 MG/24HR PATCH 24 HR Apply 1 Patch to skin as directed every 24 hours. 42 Patch 0   • Omega-3 Fatty Acids (FISH OIL) 500 MG Cap Take 1 Cap by mouth every day. 30 Cap 11   • Magnesium Sulfate, Laxative, (EPSOM SALT PO) Take  by mouth.     • olopatadine (PATANOL) 0.1 % ophthalmic solution Place 1 Drop in both eyes 2 times a day.     • nicotine polacrilex (NICORETTE) 4 MG gum Take 4 mg by mouth.     • nicotine polacrilex (NICORETTE) 4 MG gum Take 4 mg by mouth every 4 hours. 270 Each 3   • nicotine (NICODERM) 14 MG/24HR PATCH 24 HR Apply 1 Patch to skin as directed every 24 hours. 56 Patch 0   • sennosides (SENOKOT) 8.6 MG Tab Take 8.6 mg by mouth 1 time daily as needed.     • Paliperidone (INVEGA PO) Take  by mouth.       No current Epic-ordered facility-administered medications on file.        Patient has no known allergies.    Social History     Social History   • Marital status: Single     Spouse name: N/A   • Number of children: N/A   • Years of education: N/A     Social History Main Topics   • Smoking status: Current Every Day Smoker     Packs/day: 1.00     Years: 12.00   • Smokeless tobacco: Never Used   • Alcohol use No   • Drug use: No   • Sexual activity: Not Currently     Partners: Male     Other Topics Concern   • Not on file     Social History Narrative   • No narrative on file       Family History   Problem Relation Age of Onset   • Cancer Neg Hx            Objective:   Vital measurements:  Blood pressure 122/76, height 1.778 m (5' 10\"), weight (!) 128.4 kg (283 lb).  Body mass index is " "40.61 kg/m². (Goal BM I>18 <25)    Physical Exam   /76 (BP Location: Left arm, Patient Position: Sitting)   Ht 1.778 m (5' 10\")   Wt (!) 128.4 kg (283 lb)   BMI 40.61 kg/m²   Physical Exam   Constitutional: She is well-developed, well-nourished, and in no distress.   HENT:   Head: Hair is normal.   Hirsutism - chin and upper lip hair growth   Eyes: Pupils are equal, round, and reactive to light. Conjunctivae are normal.   Neck: Neck supple. No thyromegaly present.   Cardiovascular: Normal rate and regular rhythm.    No murmur heard.  Pulmonary/Chest: Effort normal and breath sounds normal.   Abdominal: Soft. There is no tenderness. There is no rebound and no guarding.   Musculoskeletal: Normal range of motion.   Neurological: She is alert.   Psychiatric: Her mood appears not anxious. She does not exhibit a depressed mood. She exhibits abnormal recent memory and abnormal remote memory.   Very poor historian affect c/w h/o mental retardation     Genitourinary:  NEFG with no abnormal pigmentation, labial fusion, rashes, tenderness, lesions or injury to the labia bilaterally. Urethral orifice normal  Vagina is pink and moist with no lesions, foul discharge, erythema, tenderness or bleeding. No foreign body around the vagina or signs of injury.   On bimanual exam there is no cervix motion tenderness.  Remainder of exam is limited by body habitus and patient's ability to tolerate exam     Assessment/Plan:     No diagnosis found.    Frances Ardon is a 42 y.o.  female who presents for her Annual Gynecologic Exam    # Preventative health care:   --Breast self awareness discussed. Mammogram - up to date - had one 2018 - recommended this annually starting at age 40  --Cervical cancer screening. Pap last year was negative but HPV+ therefore repeat pap collected today. HPV also sent. I will follow up with patient once results are back as per ASCCP guidelines.   --Smoking Cessation discussed - patient not " interested in cessation reference materials at this time.   --Colorectal screening not yet indicated.   # Contraception: Denies need - not sexually active.  Counseling performed - reports she will use condoms if is sexually active in future.  # STD screening: not indicated  # Obesity: Diet and exercise discussed as well as finding lifestyle habits that work for patient.  # Dysuria.  Unclear history of dysuria.  Unable to give urine sample in office.  Provided lab slip to drop sample off at lab. Order placed.  Will call if results abnormal and treat if indicated.  #Follow up in one year for annual or sooner if concerns or questions arise.  TEREZA

## 2018-10-03 DIAGNOSIS — Z01.419 WELL WOMAN EXAM: ICD-10-CM

## 2018-10-03 DIAGNOSIS — Z12.4 SCREENING FOR CERVICAL CANCER: ICD-10-CM

## 2018-10-03 DIAGNOSIS — Z11.51 SCREENING FOR HPV (HUMAN PAPILLOMAVIRUS): ICD-10-CM

## 2018-10-04 ENCOUNTER — HOSPITAL ENCOUNTER (OUTPATIENT)
Dept: RADIOLOGY | Facility: MEDICAL CENTER | Age: 43
End: 2018-10-04
Attending: STUDENT IN AN ORGANIZED HEALTH CARE EDUCATION/TRAINING PROGRAM
Payer: MEDICARE

## 2018-10-04 DIAGNOSIS — E78.5 DYSLIPIDEMIA: ICD-10-CM

## 2018-10-04 DIAGNOSIS — Z12.31 ENCOUNTER FOR SCREENING MAMMOGRAM FOR MALIGNANT NEOPLASM OF BREAST: ICD-10-CM

## 2018-10-04 PROCEDURE — 77067 SCR MAMMO BI INCL CAD: CPT

## 2018-10-05 LAB
CYTOLOGY REG CYTOL: NORMAL
HPV HR 12 DNA CVX QL NAA+PROBE: NEGATIVE
HPV16 DNA SPEC QL NAA+PROBE: NEGATIVE
HPV18 DNA SPEC QL NAA+PROBE: NEGATIVE
SPECIMEN SOURCE: NORMAL

## 2018-10-09 DIAGNOSIS — A63.0 HPV (HUMAN PAPILLOMA VIRUS) ANOGENITAL INFECTION: ICD-10-CM

## 2018-10-10 ENCOUNTER — TELEPHONE (OUTPATIENT)
Dept: OBGYN | Facility: CLINIC | Age: 43
End: 2018-10-10

## 2018-10-10 NOTE — TELEPHONE ENCOUNTER
Silvana Mccullough D.O.  [facility]             Please call the patient to tell her that her pap was the same as last year - negative pap with positive HPV high risk other.  She needs to have a colposcopy performed.  I've placed this order - she will need to be scheduled.  Thank you!      Called patient,  Deanna answered and stated she would have patient call us back to go over her test results.

## 2018-10-11 ENCOUNTER — HOSPITAL ENCOUNTER (OUTPATIENT)
Dept: LAB | Facility: MEDICAL CENTER | Age: 43
End: 2018-10-11
Attending: OBSTETRICS & GYNECOLOGY
Payer: MEDICARE

## 2018-10-11 DIAGNOSIS — R30.0 DYSURIA: ICD-10-CM

## 2018-10-11 DIAGNOSIS — Z01.419 WELL WOMAN EXAM: ICD-10-CM

## 2018-10-11 LAB
APPEARANCE UR: CLEAR
BILIRUB UR QL STRIP.AUTO: NEGATIVE
COLOR UR: YELLOW
GLUCOSE UR STRIP.AUTO-MCNC: NEGATIVE MG/DL
KETONES UR STRIP.AUTO-MCNC: NEGATIVE MG/DL
LEUKOCYTE ESTERASE UR QL STRIP.AUTO: NEGATIVE
MICRO URNS: NORMAL
NITRITE UR QL STRIP.AUTO: NEGATIVE
PH UR STRIP.AUTO: 6.5 [PH]
PROT UR QL STRIP: NEGATIVE MG/DL
RBC UR QL AUTO: NEGATIVE
SP GR UR STRIP.AUTO: 1.01
UROBILINOGEN UR STRIP.AUTO-MCNC: 1 MG/DL

## 2018-10-11 PROCEDURE — 81003 URINALYSIS AUTO W/O SCOPE: CPT

## 2018-10-15 ENCOUNTER — TELEPHONE (OUTPATIENT)
Dept: OBGYN | Facility: CLINIC | Age: 43
End: 2018-10-15

## 2018-10-15 NOTE — TELEPHONE ENCOUNTER
10/12/18 Ariadna (Medical Coordinator at [facility]) LM on  requesting pap results on pt.     10/15/18 1400 Called Ariadna and advised her I was unable to provide any medical information on pt as I do not have a release of information signed by the patient giving permission to release information to Ariadna Sullivan. As I notified Ariadna, the call was suddenly silent and I was unable to hear anything, I asked a couple of times if she was still on the phone without response, then noticed phone call had ended.

## 2018-10-23 DIAGNOSIS — M25.572 LEFT ANKLE PAIN, UNSPECIFIED CHRONICITY: ICD-10-CM

## 2018-10-23 RX ORDER — IBUPROFEN 800 MG/1
800 TABLET ORAL EVERY 8 HOURS PRN
Qty: 30 TAB | Refills: 0 | Status: SHIPPED | OUTPATIENT
Start: 2018-10-23 | End: 2019-05-23 | Stop reason: SDUPTHER

## 2018-10-29 ENCOUNTER — OFFICE VISIT (OUTPATIENT)
Dept: INTERNAL MEDICINE | Facility: MEDICAL CENTER | Age: 43
End: 2018-10-29
Payer: MEDICARE

## 2018-10-29 VITALS
OXYGEN SATURATION: 95 % | DIASTOLIC BLOOD PRESSURE: 80 MMHG | SYSTOLIC BLOOD PRESSURE: 120 MMHG | HEART RATE: 90 BPM | WEIGHT: 286 LBS | BODY MASS INDEX: 40.94 KG/M2 | TEMPERATURE: 98.1 F | HEIGHT: 70 IN

## 2018-10-29 DIAGNOSIS — E03.9 HYPOTHYROIDISM, UNSPECIFIED TYPE: ICD-10-CM

## 2018-10-29 DIAGNOSIS — F17.200 TOBACCO DEPENDENCE: ICD-10-CM

## 2018-10-29 DIAGNOSIS — Z00.00 HEALTHCARE MAINTENANCE: ICD-10-CM

## 2018-10-29 DIAGNOSIS — E78.5 DYSLIPIDEMIA: ICD-10-CM

## 2018-10-29 DIAGNOSIS — E66.01 MORBID OBESITY (HCC): ICD-10-CM

## 2018-10-29 PROCEDURE — 99213 OFFICE O/P EST LOW 20 MIN: CPT | Mod: GC | Performed by: INTERNAL MEDICINE

## 2018-10-29 NOTE — PROGRESS NOTES
Established Patient    Frances presents today with the following:    CC: annual physical    HPI: Frances Ardon is a 42 y.o. female who presents today for the following concerns:    Skin tag  Referred to Dermatology last visit, has appointment scheduled 12/11.     Hypothyroidism  On levothyroxine 50 MCG Daily, TSH 3.29 on 9/6.     Dyslipidemia   On atorvastatin 20 mg daily, lipid panel 9/6/2018 showed Cholesterol,Tot: 205 (H), Triglycerides: 131, HDL: 38 (A), LDL: 141 (H).    Pap smear 5/17 with positive HPV  Had repeat PAP with gynecology, normal, path says HPV negative but note says HPV positive and needs colposcopy, patient to call to clarify with them.    Healthcare maintenance  Mammogram 10/4 normal with recommended repeat in 1 year.     Obesity  Not interested in diet changes to lose weight. Walks for exercise.     Tobacco dependence  Stated she would try to cut down on the number of cigarettes last visit, 4-5 cigarettes a day now, not interested in quitting today.      Patient Active Problem List    Diagnosis Date Noted   • Morbid obesity (HCC) 05/25/2017   • Constipation 12/27/2016   • Mood disorder (HCC) 12/27/2016   • Tobacco dependence 12/27/2016   • Hypothyroidism 12/27/2016   • GERD (gastroesophageal reflux disease) 12/27/2016   • Mental retardation, mild (I.Q. 50-70) 12/27/2016   • Dyslipidemia 12/27/2016   • History of seizures 12/27/2016       Current Outpatient Prescriptions   Medication Sig Dispense Refill   • ibuprofen (MOTRIN) 800 MG Tab Take 1 Tab by mouth every 8 hours as needed for Mild Pain. 30 Tab 0   • atorvastatin (LIPITOR) 20 MG Tab Take 1 Tab by mouth every bedtime. 90 Tab 1   • omeprazole (PRILOSEC) 20 MG delayed-release capsule TAKE 1 CAPSULE BY MOUTH 1 TIME DAILY 90 Cap 0   • levothyroxine (SYNTHROID) 50 MCG Tab Take 1 Tab by mouth Every morning on an empty stomach. 30 Tab 5   • polyethylene glycol/lytes (MIRALAX) Pack Take 1 Packet by mouth every day. 90 Each 0   •  "loratadine (CLARITIN) 10 MG Tab Take 10 mg by mouth every day. TAKE 1 TAB BY MOUTH EVERY DAY.  0   • nicotine (NICODERM) 21 MG/24HR PATCH 24 HR Apply 1 Patch to skin as directed every 24 hours. 42 Patch 0   • Omega-3 Fatty Acids (FISH OIL) 500 MG Cap Take 1 Cap by mouth every day. 30 Cap 11   • Magnesium Sulfate, Laxative, (EPSOM SALT PO) Take  by mouth.     • olopatadine (PATANOL) 0.1 % ophthalmic solution Place 1 Drop in both eyes 2 times a day.     • nicotine polacrilex (NICORETTE) 4 MG gum Take 4 mg by mouth.     • nicotine polacrilex (NICORETTE) 4 MG gum Take 4 mg by mouth every 4 hours. 270 Each 3   • nicotine (NICODERM) 14 MG/24HR PATCH 24 HR Apply 1 Patch to skin as directed every 24 hours. 56 Patch 0   • sennosides (SENOKOT) 8.6 MG Tab Take 8.6 mg by mouth 1 time daily as needed.     • Paliperidone (INVEGA PO) Take  by mouth.       No current facility-administered medications for this visit.        ROS: As per HPI. Additional pertinent symptoms as noted below.    Constitutional: Denies weight loss, fever, chills, weakness, malaise.  Eyes: Denies blurred vision, double vision, yellow sclerae.  ENT: Denies hearing loss, congestion, runny nose, sore throat.  Cardiovascular: Denies chest pain, palpitations.  Respiratory: Denies dyspnea, productive cough.  GI: Denies nausea, vomiting, diarrhea, abdominal pain.  : Denies dysuria, urinary urgency or frequency.  Musculo-skeletal: Denies muscle spasms, joint stiffness.  Skin: Denies change in skin, hair, nails.  Neurological: Denies paresthesias, fasciculations, seizures, focal weakness.  Psychological: Denies change in personality, affect, depression.  All others negative    /80 (BP Location: Left arm, Patient Position: Sitting, BP Cuff Size: Large adult)   Pulse 90   Temp 36.7 °C (98.1 °F) (Temporal)   Ht 1.778 m (5' 10\")   Wt (!) 129.7 kg (286 lb)   SpO2 95%   BMI 41.04 kg/m²     Physical Exam   General: No apparent distress.  Eyes: Extraocular " movements grossly intact. No scleral icterus.  Throat: No erythema or exudates noted.  Neck: Supple. No lymphadenopathy noted. Thyroid not enlarged.  Lungs: Clear to auscultation bilaterally.  Cardiovascular: Regular rate and rhythm.  Abdomen: Soft, non-tender, non-distended.  Extremities: No cyanosis or edema.  Skin: No rash or suspicious skin lesions noted on exposed skin.  Neurological: Alert and oriented.  Psychiatric: Normal mood and affect.    Note: I have reviewed all pertinent labs and diagnostic tests associated with this visit with specific comments listed under the assessment and plan below    Assessment and Plan    1. Dyslipidemia  Statin changed last month, recheck lipids to re-evaluate need for increased intensity.  - LIPID PANEL; 6 months    2. Hypothyroidism, unspecified type  Normal TFTs.  - Continue thyroxine  - TSH; Future    3. Morbid obesity (HCC)  Not interested in diet changes.  - Revisit next appointment    4. Tobacco dependence  Not interested in quitting.  - Revisit next appointment    5. Skin tag  - Follow up with Derm as scheduled    5. Healthcare maintenance  - Follow up with Ob/Gyn      Followup: Return in about 6 months (around 4/29/2019).      Signed by: Cong Perez M.D.

## 2018-11-19 DIAGNOSIS — F17.200 TOBACCO DEPENDENCE: ICD-10-CM

## 2018-11-20 RX ORDER — NICOTINE 21 MG/24HR
1 PATCH, TRANSDERMAL 24 HOURS TRANSDERMAL EVERY 24 HOURS
Qty: 56 PATCH | Refills: 0 | Status: SHIPPED | OUTPATIENT
Start: 2018-11-20 | End: 2019-03-20

## 2018-11-20 RX ORDER — OMEGA-3/DHA/EPA/FISH OIL 60 MG-90MG
1 CAPSULE ORAL DAILY
Qty: 90 CAP | Refills: 0 | Status: SHIPPED | OUTPATIENT
Start: 2018-11-20 | End: 2019-02-19 | Stop reason: SDUPTHER

## 2018-11-20 RX ORDER — OMEPRAZOLE 20 MG/1
CAPSULE, DELAYED RELEASE ORAL
Qty: 90 CAP | Refills: 0 | Status: SHIPPED | OUTPATIENT
Start: 2018-11-20 | End: 2019-02-19 | Stop reason: SDUPTHER

## 2018-11-21 NOTE — TELEPHONE ENCOUNTER
Last seen: 10/29/18 by Dr. Perez  Next appt: 04/24/19 with Dr. Perez    Was the patient seen in the last year in this department? Yes   Does patient have an active prescription for medications requested? No   Received Request Via: Pharmacy

## 2018-11-22 RX ORDER — POLYETHYLENE GLYCOL 3350 17 G/17G
17 POWDER, FOR SOLUTION ORAL DAILY
Qty: 90 EACH | Refills: 0 | Status: SHIPPED | OUTPATIENT
Start: 2018-11-22 | End: 2019-03-28 | Stop reason: SDUPTHER

## 2018-12-11 ENCOUNTER — OFFICE VISIT (OUTPATIENT)
Dept: INTERNAL MEDICINE | Facility: MEDICAL CENTER | Age: 43
End: 2018-12-11
Payer: MEDICARE

## 2018-12-11 VITALS — WEIGHT: 284.2 LBS | BODY MASS INDEX: 40.69 KG/M2 | HEIGHT: 70 IN

## 2018-12-11 DIAGNOSIS — L91.8 ACROCHORDON: ICD-10-CM

## 2018-12-11 DIAGNOSIS — L81.4 LENTIGO: ICD-10-CM

## 2018-12-11 DIAGNOSIS — D18.00 ANGIOMA: ICD-10-CM

## 2018-12-11 DIAGNOSIS — D22.9 NEVUS: ICD-10-CM

## 2018-12-11 DIAGNOSIS — L81.8 TATTOO: ICD-10-CM

## 2018-12-11 PROCEDURE — 99203 OFFICE O/P NEW LOW 30 MIN: CPT | Performed by: DERMATOLOGY

## 2018-12-11 NOTE — PROGRESS NOTES
"           Frances Ardon  1975  [id number]    Consultation             Vitals:    12/11/18 1005   Weight: (!) 128.9 kg (284 lb 3.2 oz)   Height: 1.778 m (5' 10\")       Chief Complaint   Patient presents with   • New Patient     Full body skin check      ___________________________________________________________________            History of Present Illness (HPI) c/o growth under L axilla. Here for skin check. Pt is not complaining of any bothersome spots.. Pt has a few tattooes and moles on back x a while.         Dr. Lundberg has referred for a consultation to evaluate spots.    Current Outpatient Prescriptions on File Prior to Visit   Medication Sig Dispense Refill   • polyethylene glycol/lytes (MIRALAX) Pack Take 1 Packet by mouth every day. 90 Each 0   • Omega-3 Fatty Acids (FISH OIL) 500 MG Cap Take 1 Cap by mouth every day. 90 Cap 0   • nicotine (NICODERM) 14 MG/24HR PATCH 24 HR Apply 1 Patch to skin as directed every 24 hours. 56 Patch 0   • nicotine polacrilex (NICORETTE) 4 MG gum Take 4 mg by mouth every 4 hours. 270 Each 3   • omeprazole (PRILOSEC) 20 MG delayed-release capsule TAKE 1 CAPSULE BY MOUTH 1 TIME DAILY 90 Cap 0   • ibuprofen (MOTRIN) 800 MG Tab Take 1 Tab by mouth every 8 hours as needed for Mild Pain. 30 Tab 0   • atorvastatin (LIPITOR) 20 MG Tab Take 1 Tab by mouth every bedtime. 90 Tab 1   • levothyroxine (SYNTHROID) 50 MCG Tab Take 1 Tab by mouth Every morning on an empty stomach. 30 Tab 5   • loratadine (CLARITIN) 10 MG Tab Take 10 mg by mouth every day. TAKE 1 TAB BY MOUTH EVERY DAY.  0   • nicotine (NICODERM) 21 MG/24HR PATCH 24 HR Apply 1 Patch to skin as directed every 24 hours. 42 Patch 0   • Magnesium Sulfate, Laxative, (EPSOM SALT PO) Take  by mouth.     • olopatadine (PATANOL) 0.1 % ophthalmic solution Place 1 Drop in both eyes 2 times a day.     • nicotine polacrilex (NICORETTE) 4 MG gum Take 4 mg by mouth.     • sennosides (SENOKOT) 8.6 MG Tab Take 8.6 mg by mouth 1 time daily " as needed.     • Paliperidone (INVEGA PO) Take  by mouth.       No current facility-administered medications on file prior to visit.      Patient has no known allergies.      Review of Systems:        General: Denies fever      Hematologic: no excessive bleeding problems              Past Medical History:   Diagnosis Date   • Arthritis    • Asthma    • Dyslipidemia 12/27/2016   • GERD (gastroesophageal reflux disease) 12/27/2016   • History of seizures 12/27/2016   • Hypertension    • Hypothyroidism 12/27/2016   • Mental retardation, mild (I.Q. 50-70) 12/27/2016   • Tobacco dependence 12/27/2016     Skin Cancer no h/o    Social History   Substance Use Topics   • Smoking status: Current Every Day Smoker     Packs/day: 1.50     Years: 12.00   • Smokeless tobacco: Never Used   • Alcohol use No     Sunscreen Use:Yes avoids sun    History reviewed. No pertinent surgical history.        Family History   Problem Relation Age of Onset   • Cancer Neg Hx          Physical Exam:   Constitutional: Well nourished, NAD, pleasant  alert and cooperative; normal mood and affect; normal attention span and concentration.    Skin Full body exam done: no suspicious lesions on eyelids, lips, face, ears, neck, chest, back, abdomen, upper extremities, fingernails, lower extremities except as noted   Face few skin colored dome shaped paps  Abdomen several red vascular paps  B/l forearms and L upper back tattoos  L upper back in tattoo few brown regular flat macs  B/l axilla few pedunculated skin colored pap                   Assessment and Plan:   1. Acrochordon  Several under axilla - can be due to weight. Recommend weight loss. Explained can be cosmetic to remove. Follow for changes. Recommend follow for changes. ABCD's of changing skin lesions were reviewed, and the appropriate use of sunscreen was outlined and recommended.      2. Lentigo  Recommend follow for changes. ABCD's of changing skin lesions were reviewed, and the appropriate  use of sunscreen was outlined and recommended.      3. Nevus  Recommend follow for changes. ABCD's of changing skin lesions were reviewed, and the appropriate use of sunscreen was outlined and recommended.  Pt has a few in tattoo on back - follow for changes.         4. Angioma  Explained spot is a blood vessel. Recommend follow for changes. ABCD's of changing skin lesions were reviewed, and the appropriate use of sunscreen was outlined and recommended.      5. Tattoo  Follow nevi on back for changes.       Hue Hyde M.D.   Return if symptoms worsen or fail to improve.

## 2019-01-06 NOTE — NUR
PT STATES SHE IS STILL UNABLE TO URINATE. CALM AND COOPERATIVE. NAD NOTED. 
CALLL LIGHT IS WITHIN REACH.

## 2019-01-06 NOTE — NUR
PT C/O DYSURIA X SEVERAL WEEKS. ALSO COUGH AND CONGESTION TODAY. CALL LIGHT IS 
WITHIN REACH. NAD NOTED.

## 2019-01-22 DIAGNOSIS — E78.5 DYSLIPIDEMIA: ICD-10-CM

## 2019-01-22 NOTE — TELEPHONE ENCOUNTER
Last seen: 10/29/18by Dr. Perez  Next appt: 04/23/19 with Dr. Perez    Was the patient seen in the last year in this department? Yes   Does patient have an active prescription for medications requested? No   Received Request Via: Pharmacy

## 2019-01-23 RX ORDER — ATORVASTATIN CALCIUM 20 MG/1
20 TABLET, FILM COATED ORAL
Qty: 90 TAB | Refills: 0 | Status: SHIPPED | OUTPATIENT
Start: 2019-01-23 | End: 2019-03-20

## 2019-01-27 NOTE — NUR
PT TO ED FOR L FOOT PAIN. PT STATES SHE FELL THIS AM AND HIT HER MID BACK AND 
POSTERIOR HEAD. HTN IN TRIAGE. /71 NOW. AWAITING MD ASSESSMENT.

## 2019-01-31 NOTE — TELEPHONE ENCOUNTER
PT SEEN: 12/11/18 WITH Dr. JESUS MEDINA APPT 2/12/19 WITH Dr. SANOT  Was the patient seen in the last year in this department? Yes     Does patient have an active prescription for medications requested? No     Received Request Via: Pharmacy

## 2019-02-01 RX ORDER — LEVOTHYROXINE SODIUM 0.05 MG/1
50 TABLET ORAL
Qty: 30 TAB | Refills: 0 | Status: SHIPPED | OUTPATIENT
Start: 2019-02-01 | End: 2019-02-04 | Stop reason: SDUPTHER

## 2019-02-04 NOTE — TELEPHONE ENCOUNTER
PT SEEN: 12/11/18 WITH Dr. JESUS MEDINA APPT 2/12/19 WITH Dr. SANTO  Was the patient seen in the last year in this department? Yes     Does patient have an active prescription for medications requested? No     Received Request Via: Pharmacy

## 2019-02-05 RX ORDER — LEVOTHYROXINE SODIUM 0.05 MG/1
50 TABLET ORAL
Qty: 90 TAB | Refills: 0 | Status: SHIPPED | OUTPATIENT
Start: 2019-02-05 | End: 2022-07-26

## 2019-02-12 PROBLEM — S99.922A INJURY OF LEFT FOOT: Status: ACTIVE | Noted: 2019-02-12

## 2019-02-19 RX ORDER — OMEGA-3/DHA/EPA/FISH OIL 60 MG-90MG
1 CAPSULE ORAL DAILY
Qty: 90 CAP | Refills: 0 | Status: SHIPPED | OUTPATIENT
Start: 2019-02-19 | End: 2019-05-20 | Stop reason: SDUPTHER

## 2019-02-19 RX ORDER — OMEPRAZOLE 20 MG/1
CAPSULE, DELAYED RELEASE ORAL
Qty: 90 CAP | Refills: 0 | Status: SHIPPED | OUTPATIENT
Start: 2019-02-19 | End: 2019-05-20 | Stop reason: SDUPTHER

## 2019-02-19 NOTE — TELEPHONE ENCOUNTER
Last seen: 10/29/18 by Dr. Perez  Next appt: 02/28/19 with Dr. Abebe    Was the patient seen in the last year in this department? Yes   Does patient have an active prescription for medications requested? No   Received Request Via: Pharmacy

## 2019-03-20 ENCOUNTER — OFFICE VISIT (OUTPATIENT)
Dept: INTERNAL MEDICINE | Facility: MEDICAL CENTER | Age: 44
End: 2019-03-20
Payer: MEDICARE

## 2019-03-20 VITALS
BODY MASS INDEX: 40.34 KG/M2 | DIASTOLIC BLOOD PRESSURE: 78 MMHG | HEART RATE: 81 BPM | TEMPERATURE: 97.2 F | WEIGHT: 281.8 LBS | OXYGEN SATURATION: 94 % | HEIGHT: 70 IN | SYSTOLIC BLOOD PRESSURE: 124 MMHG

## 2019-03-20 DIAGNOSIS — E03.9 HYPOTHYROIDISM, UNSPECIFIED TYPE: ICD-10-CM

## 2019-03-20 DIAGNOSIS — Z00.00 HEALTHCARE MAINTENANCE: ICD-10-CM

## 2019-03-20 DIAGNOSIS — R79.9 ABNORMAL FINDING OF BLOOD CHEMISTRY: ICD-10-CM

## 2019-03-20 PROCEDURE — 99213 OFFICE O/P EST LOW 20 MIN: CPT | Mod: GE | Performed by: INTERNAL MEDICINE

## 2019-03-20 RX ORDER — CYCLOBENZAPRINE HCL 10 MG
10 TABLET ORAL 3 TIMES DAILY PRN
COMMUNITY
End: 2019-06-20

## 2019-03-20 ASSESSMENT — PATIENT HEALTH QUESTIONNAIRE - PHQ9: CLINICAL INTERPRETATION OF PHQ2 SCORE: 0

## 2019-03-20 NOTE — PROGRESS NOTES
"      Established Patient    Frances presents today with the following:    CC: wellness visit, hypothyroidism    HPI: Frances Ardon is a 43 y.o. female with a history of intellectual disability residing in a group home who presents today for the following concerns:    Pap smear 5/17/18   Had negative PAP 5/17/18 with Gynecology, HPV negative.    Blood pressure  124/78 today. The patient denies any symptoms referable to her elevated blood pressure. Specifically denies chest pain, palpitations, dyspnea, orthopnea, PND or peripheral edema. Current medication regimen is as listed below. Patient denies any side effects of medication.     Tobacco dependence  Per caregiver hasn't smoked in 2 weeks, and when she does smoke doesn't inhale, uses it like a cigar. 2 packs per week. When caregiver suggested quitting patient got upset so they got her more.    Mammogram  Normal 10/04/2018.    Mood  Denies depression. Sees Psychiatry Dr. Reed, next appointment is next month.    Morbid obesity  Diabetes screening  Dyslipidemia  Lipid panel not done since last visit. Caregiver states diet \"is much better since I've been cooking,\" lots of vegetables and fruit, little red meat, occasional snacks and sweets but only a couple times a week. Tries to walk everyday, walks a lot for work. Likes to bowl and golf but hasn't done them lately.     Skin cancer screening  Sees Dermatology, last saw 12/11/18.    Hypothyroidism  TSH not done since last visit. Denies fatigue, lethargy, feeling cold, hair loss or dryness.      Patient Active Problem List    Diagnosis Date Noted   • BMI 40.0-44.9, adult ([facility]) 03/20/2019   • Injury of left foot 02/12/2019   • Acrochordon 12/11/2018   • Lentigo 12/11/2018   • Nevus 12/11/2018   • Angioma 12/11/2018   • Tattoo 12/11/2018   • Morbid obesity (HCC) 05/25/2017   • Constipation 12/27/2016   • Tobacco dependence 12/27/2016   • Hypothyroidism 12/27/2016   • GERD (gastroesophageal reflux disease) 12/27/2016  "   • Mental retardation, mild (I.Q. 50-70) 12/27/2016   • Dyslipidemia 12/27/2016   • History of seizures 12/27/2016       Current Outpatient Prescriptions   Medication Sig Dispense Refill   • cyclobenzaprine (FLEXERIL) 10 MG Tab Take 10 mg by mouth 3 times a day as needed.     • omeprazole (PRILOSEC) 20 MG delayed-release capsule TAKE 1 CAPSULE BY MOUTH 1 TIME DAILY 90 Cap 0   • Omega-3 Fatty Acids (FISH OIL) 500 MG Cap Take 1 Cap by mouth every day. 90 Cap 0   • levothyroxine (SYNTHROID) 50 MCG Tab Take 1 Tab by mouth Every morning on an empty stomach. 90 Tab 0   • polyethylene glycol/lytes (MIRALAX) Pack Take 1 Packet by mouth every day. 90 Each 0   • loratadine (CLARITIN) 10 MG Tab Take 10 mg by mouth every day. TAKE 1 TAB BY MOUTH EVERY DAY.  0   • Magnesium Sulfate, Laxative, (EPSOM SALT PO) Take  by mouth.     • paliperidone (INVEGA) 3 MG ER tablet Take  by mouth.     • ibuprofen (MOTRIN) 800 MG Tab Take 1 Tab by mouth every 8 hours as needed for Mild Pain. 30 Tab 0   • olopatadine (PATANOL) 0.1 % ophthalmic solution Place 1 Drop in both eyes 2 times a day.     • nicotine polacrilex (NICORETTE) 4 MG gum Take 4 mg by mouth.     • sennosides (SENOKOT) 8.6 MG Tab Take 8.6 mg by mouth 1 time daily as needed.       No current facility-administered medications for this visit.        ROS: As per HPI. Additional pertinent symptoms as noted below.    Constitutional: Denies weight loss, fever, chills, weakness, malaise.  Eyes: Denies blurred vision, double vision, yellow sclerae.  ENT: Denies hearing loss, congestion, runny nose, sore throat.  Cardiovascular: Denies chest pain, palpitations.  Respiratory: Denies dyspnea, productive cough.  GI: Denies nausea, vomiting, diarrhea, abdominal pain.  : Denies dysuria, urinary urgency or frequency.  Musculo-skeletal: Denies muscle spasms, joint stiffness.  Skin: Denies change in skin, hair, nails.  Neurological: Denies paresthesias, fasciculations, seizures, focal  "weakness.  Psychological: Denies change in personality, affect, depression.  All others negative    /78 (BP Location: Right arm, Patient Position: Sitting)   Pulse 81   Temp 36.2 °C (97.2 °F) (Temporal)   Ht 1.778 m (5' 10\")   Wt (!) 127.8 kg (281 lb 12.8 oz)   SpO2 94%   BMI 40.43 kg/m²     Physical Exam   General: No apparent distress.  Eyes: Extraocular movements grossly intact. No scleral icterus.  Throat: No erythema or exudates noted.  Neck: Supple. No lymphadenopathy noted. Thyroid not enlarged.  Lungs: Clear to auscultation bilaterally.  Cardiovascular: Regular rate and rhythm.  Abdomen: Obese. Soft, non-tender, non-distended.  Extremities: No cyanosis or edema.  Skin: No rash or suspicious skin lesions noted on exposed skin.  Neurological: Alert and oriented.  Psychiatric: Normal mood and affect.    Note: I have reviewed all pertinent labs and diagnostic tests associated with this visit with specific comments listed under the assessment and plan below    Assessment and Plan    1. Healthcare maintenance  PAP and mammogram are up to date. Blood pressure is in good range. Mood is stable. Tobacco cessation and weight loss are challenging due to intellectual disability and group home setting. Atorvastatin 20 mg daily removed from med list for unknown reason.  - Reprinted lipid lab order  - HEMOGLOBIN A1C; Future  - Follow up with Psychiatry Dr. Reed as scheduled  - Revisit smoking and weight loss next visit  - May need to restart atorvastatin based on lipid panel    2. Hypothyroidism, unspecified type  Labs not done since last visit. No new symptoms of hypothyroidism.  - Reprinted TSH lab order    Followup: Return in about 3 months (around 6/20/2019).      Signed by: Cong Perez M.D.  "

## 2019-03-28 NOTE — TELEPHONE ENCOUNTER
PT SEEN: 3/20/19 WITH Dr. SANTO NEXT APPT 4/23/19 WITH Dr. SANTO  Was the patient seen in the last year in this department? Yes     Does patient have an active prescription for medications requested? No     Received Request Via: Pharmacy

## 2019-04-01 RX ORDER — POLYETHYLENE GLYCOL 3350 17 G/17G
17 POWDER, FOR SOLUTION ORAL DAILY
Qty: 90 EACH | Refills: 2 | Status: SHIPPED | OUTPATIENT
Start: 2019-04-01 | End: 2019-06-20

## 2019-04-09 ENCOUNTER — HOSPITAL ENCOUNTER (OUTPATIENT)
Dept: LAB | Facility: MEDICAL CENTER | Age: 44
End: 2019-04-09
Attending: STUDENT IN AN ORGANIZED HEALTH CARE EDUCATION/TRAINING PROGRAM
Payer: MEDICARE

## 2019-04-09 DIAGNOSIS — E03.9 HYPOTHYROIDISM, UNSPECIFIED TYPE: ICD-10-CM

## 2019-04-09 DIAGNOSIS — R79.9 ABNORMAL FINDING OF BLOOD CHEMISTRY: ICD-10-CM

## 2019-04-09 DIAGNOSIS — E78.5 DYSLIPIDEMIA: ICD-10-CM

## 2019-04-09 LAB
CHOLEST SERPL-MCNC: 211 MG/DL (ref 100–199)
EST. AVERAGE GLUCOSE BLD GHB EST-MCNC: 126 MG/DL
FASTING STATUS PATIENT QL REPORTED: NORMAL
HBA1C MFR BLD: 6 % (ref 0–5.6)
HDLC SERPL-MCNC: 38 MG/DL
LDLC SERPL CALC-MCNC: 151 MG/DL
TRIGL SERPL-MCNC: 108 MG/DL (ref 0–149)
TSH SERPL DL<=0.005 MIU/L-ACNC: 5.12 UIU/ML (ref 0.38–5.33)

## 2019-04-09 PROCEDURE — 83036 HEMOGLOBIN GLYCOSYLATED A1C: CPT | Mod: GA

## 2019-04-09 PROCEDURE — 84443 ASSAY THYROID STIM HORMONE: CPT

## 2019-04-09 PROCEDURE — 36415 COLL VENOUS BLD VENIPUNCTURE: CPT

## 2019-04-09 PROCEDURE — 80061 LIPID PANEL: CPT

## 2019-04-23 ENCOUNTER — OFFICE VISIT (OUTPATIENT)
Dept: INTERNAL MEDICINE | Facility: MEDICAL CENTER | Age: 44
End: 2019-04-23
Payer: MEDICARE

## 2019-04-23 VITALS
TEMPERATURE: 98.7 F | HEART RATE: 88 BPM | BODY MASS INDEX: 41.23 KG/M2 | SYSTOLIC BLOOD PRESSURE: 122 MMHG | DIASTOLIC BLOOD PRESSURE: 76 MMHG | WEIGHT: 288 LBS | OXYGEN SATURATION: 97 % | HEIGHT: 70 IN

## 2019-04-23 DIAGNOSIS — E66.01 MORBID OBESITY (HCC): ICD-10-CM

## 2019-04-23 DIAGNOSIS — E03.9 HYPOTHYROIDISM, UNSPECIFIED TYPE: ICD-10-CM

## 2019-04-23 DIAGNOSIS — L60.2 LONG TOENAIL: ICD-10-CM

## 2019-04-23 DIAGNOSIS — F17.200 TOBACCO DEPENDENCE: ICD-10-CM

## 2019-04-23 DIAGNOSIS — E78.5 DYSLIPIDEMIA: ICD-10-CM

## 2019-04-23 DIAGNOSIS — R73.03 PREDIABETES: ICD-10-CM

## 2019-04-23 PROBLEM — D22.9 NEVUS: Status: RESOLVED | Noted: 2018-12-11 | Resolved: 2019-04-23

## 2019-04-23 PROBLEM — D18.00 ANGIOMA: Status: RESOLVED | Noted: 2018-12-11 | Resolved: 2019-04-23

## 2019-04-23 PROBLEM — L91.8 ACROCHORDON: Status: RESOLVED | Noted: 2018-12-11 | Resolved: 2019-04-23

## 2019-04-23 PROBLEM — L81.8 TATTOO: Status: RESOLVED | Noted: 2018-12-11 | Resolved: 2019-04-23

## 2019-04-23 PROBLEM — L81.4 LENTIGO: Status: RESOLVED | Noted: 2018-12-11 | Resolved: 2019-04-23

## 2019-04-23 PROBLEM — S99.922A INJURY OF LEFT FOOT: Status: RESOLVED | Noted: 2019-02-12 | Resolved: 2019-04-23

## 2019-04-23 PROCEDURE — 99213 OFFICE O/P EST LOW 20 MIN: CPT | Mod: GE | Performed by: INTERNAL MEDICINE

## 2019-04-23 ASSESSMENT — PAIN SCALES - GENERAL: PAINLEVEL: NO PAIN

## 2019-04-23 NOTE — PROGRESS NOTES
Established Patient    Frances presents today with the following:    CC: Tobacco dependence    HPI: Frances Ardon is a 43 y.o. female who presents today for the following concerns:    Dyslipidemia  Was previously taking atorvastatin, which was taken off her med list, could not find documentation why. ASCVD 4.9%. Patient denies any signs/symptoms of cardiovascular disease. Denies chest pain/pressure; denies numbness/weakness or difficulty with speech/swallowing or sudden change in vision.      Ref. Range 4/9/2019 11:58   Cholesterol,Tot Latest Ref Range: 100 - 199 mg/dL 211 (H)   Triglycerides Latest Ref Range: 0 - 149 mg/dL 108   HDL Latest Ref Range: >=40 mg/dL 38 (A)   LDL Latest Ref Range: <100 mg/dL 151 (H)     Prediabetes  Denies changes in vision, foot numbness or infections, urinary symptoms.  Has never taken antihyperglycemic medications.  Has never had to check home blood sugars.     Ref. Range 4/9/2019 11:58   Glycohemoglobin Latest Ref Range: 0.0 - 5.6 % 6.0 (H)   Estim. Avg Glu Latest Units: mg/dL 126   Fasting Status Unknown Fasting     Obesity  Gained 7 lbs since last visit. Walk program starting next week.  Diet difficult to control as there are different caregivers during different times of the week and minimal structured diet over the weekends, with patient able to snack without strict oversight.    Hypothyroidism, unspecified type  Denies fatigue, feeling cold, dry skin, hoarseness, muscle weakness, constipation.  States she is compliant with her levothyroxine.    Tobacco use  Still smoking.  Smokes when she gets upset.  Smokes within approximately 1 hour of waking up and throughout the day.  Nicotine replacement therapy patches did not work previously, requesting a different modality of nicotine replacement.     Ref. Range 4/9/2019 11:58   TSH Latest Ref Range: 0.380 - 5.330 uIU/mL 5.120     Toenails  Long toenails that are thick and difficult to cut per caregiver. Has seen a podiatrist  in the past.      Patient Active Problem List    Diagnosis Date Noted   • BMI 40.0-44.9, adult ([facility]) 03/20/2019   • Morbid obesity ([facility]) 05/25/2017   • Tobacco dependence 12/27/2016   • Hypothyroidism 12/27/2016   • GERD (gastroesophageal reflux disease) 12/27/2016   • Mental retardation, mild (I.Q. 50-70) 12/27/2016   • Dyslipidemia 12/27/2016   • History of seizures 12/27/2016       Current Outpatient Prescriptions   Medication Sig Dispense Refill   • nicotine polacrilex (NICORETTE) 4 MG gum Take 4 mg by mouth every 1 hour as needed. 270 Each 1   • omeprazole (PRILOSEC) 20 MG delayed-release capsule TAKE 1 CAPSULE BY MOUTH 1 TIME DAILY 90 Cap 0   • Omega-3 Fatty Acids (FISH OIL) 500 MG Cap Take 1 Cap by mouth every day. 90 Cap 0   • levothyroxine (SYNTHROID) 50 MCG Tab Take 1 Tab by mouth Every morning on an empty stomach. 90 Tab 0   • loratadine (CLARITIN) 10 MG Tab Take 10 mg by mouth every day. TAKE 1 TAB BY MOUTH EVERY DAY.  0   • polyethylene glycol/lytes (MIRALAX) Pack Take 1 Packet by mouth every day. 90 Each 2   • cyclobenzaprine (FLEXERIL) 10 MG Tab Take 10 mg by mouth 3 times a day as needed.     • ibuprofen (MOTRIN) 800 MG Tab Take 1 Tab by mouth every 8 hours as needed for Mild Pain. 30 Tab 0   • Magnesium Sulfate, Laxative, (EPSOM SALT PO) Take  by mouth.     • olopatadine (PATANOL) 0.1 % ophthalmic solution Place 1 Drop in both eyes 2 times a day.     • nicotine polacrilex (NICORETTE) 4 MG gum Take 4 mg by mouth.     • sennosides (SENOKOT) 8.6 MG Tab Take 8.6 mg by mouth 1 time daily as needed.     • paliperidone (INVEGA) 3 MG ER tablet Take  by mouth.       No current facility-administered medications for this visit.        ROS: As per HPI. Additional pertinent symptoms as noted below.    Constitutional: Denies weight loss, fever, chills, weakness, malaise.  Eyes: Denies blurred vision, double vision, yellow sclerae.  ENT: Denies hearing loss, congestion, runny nose, sore throat.  Cardiovascular:  "Denies chest pain, palpitations.  Respiratory: Denies dyspnea, productive cough.  GI: Denies nausea, vomiting, diarrhea, abdominal pain.  : Denies dysuria, urinary urgency or frequency.  Musculo-skeletal: Denies muscle spasms, joint stiffness.  Skin: Denies change in skin, hair, nails.  Neurological: Denies paresthesias, fasciculations, seizures, focal weakness.  Psychological: Denies change in personality, affect, depression.  All others negative    /76 (BP Location: Left arm, Patient Position: Sitting, BP Cuff Size: Adult long)   Pulse 88   Temp 37.1 °C (98.7 °F) (Temporal)   Ht 1.778 m (5' 10\")   Wt (!) 130.6 kg (288 lb)   SpO2 97%   Breastfeeding? No   BMI 41.32 kg/m²     Physical Exam   General: No apparent distress.  Eyes: Extraocular movements grossly intact. No scleral icterus.  Throat: No erythema or exudates noted.  Neck: Supple. No lymphadenopathy noted. Thyroid not enlarged.  Lungs: Clear to auscultation bilaterally.  Cardiovascular: Regular rate and rhythm.  Abdomen: Soft, non-tender, non-distended.  Extremities: No cyanosis or edema.  Skin: No rash or suspicious skin lesions noted on exposed skin.  Neurological: Alert and oriented.  Psychiatric: Normal mood and affect.    Note: I have reviewed all pertinent labs and diagnostic tests associated with this visit with specific comments listed under the assessment and plan below    Assessment and Plan    1. Dyslipidemia  2. Prediabetes  3. Morbid obesity (HCC)  ASCVD below cut off for statin therapy, will not prescribe a statin at this time.  Patient counseled extensively about diet, cholesterol, blood sugar, however it is unclear how much she understands regarding these diagnoses and prognosis and need for treatment.  Discussed also with caregiver who was present.  Patient not interested in making lifestyle changes at this time regarding diet and weight loss.  -Diabetic diet, to be enforced by caregivers a group home  -Recheck weight next " visit    4. Hypothyroidism, unspecified type  TSH within normal limits.  No symptoms at this time.  -Continue levothyroxine    5. Tobacco dependence  Interested in quitting, wants to try gum.  Using higher doses patient started smoking not long after awakening in the morning.  Went over nicotine replacement therapy, dosing, frequency.  - nicotine polacrilex (NICORETTE) 4 MG gum; Take 4 mg by mouth every 1 hour as needed.  Dispense: 270 Each; Refill: 1    6. Long toenail  Unable to be managed to group home.  Patient already has established care with podiatry previously.  -Follow-up with podiatry    Followup: 7/3/19 as previously scheduled      Signed by: Cong Perez M.D.

## 2019-04-23 NOTE — PATIENT INSTRUCTIONS
For nicotine gum  Weeks 1 to 6: Chew 1 piece of gum every 1 to 2 hours (maximum: 24 pieces/day); to increase chances of quitting, chew at least 9 pieces/day during the first 6 weeks  Weeks 7 to 9: Chew 1 piece of gum every 2 to 4 hours (maximum: 24 pieces/day)  Weeks 10 to 12: Chew 1 piece of gum every 4 to 8 hours (maximum: 24 pieces/day)

## 2019-04-27 ENCOUNTER — HOSPITAL ENCOUNTER (EMERGENCY)
Facility: MEDICAL CENTER | Age: 44
End: 2019-04-27
Attending: EMERGENCY MEDICINE
Payer: MEDICARE

## 2019-04-27 ENCOUNTER — APPOINTMENT (OUTPATIENT)
Dept: RADIOLOGY | Facility: MEDICAL CENTER | Age: 44
End: 2019-04-27
Attending: EMERGENCY MEDICINE
Payer: MEDICARE

## 2019-04-27 VITALS
SYSTOLIC BLOOD PRESSURE: 146 MMHG | OXYGEN SATURATION: 98 % | DIASTOLIC BLOOD PRESSURE: 90 MMHG | RESPIRATION RATE: 16 BRPM | HEART RATE: 77 BPM | WEIGHT: 280.87 LBS | BODY MASS INDEX: 39.32 KG/M2 | TEMPERATURE: 98.4 F | HEIGHT: 71 IN

## 2019-04-27 DIAGNOSIS — R07.89 CHEST WALL PAIN: ICD-10-CM

## 2019-04-27 PROCEDURE — 99284 EMERGENCY DEPT VISIT MOD MDM: CPT

## 2019-04-27 PROCEDURE — 71045 X-RAY EXAM CHEST 1 VIEW: CPT

## 2019-04-27 ASSESSMENT — LIFESTYLE VARIABLES: DO YOU DRINK ALCOHOL: NO

## 2019-04-28 NOTE — ED PROVIDER NOTES
"ER Provider Note     Scribed for Eder Chaparro M.D. by Yemi Lopez. 4/27/2019, 6:47 PM.    Primary Care Provider: Cong Perez M.D.  Means of Arrival: Walk in   History obtained from: Patient  History limited by: None     CHIEF COMPLAINT  Chief Complaint   Patient presents with   • Other     patient c/o \"I have pain everywhere when I lay on the couch and my bed\".        HPI  Frances Ardon is a 43 y.o. female who presents to the Emergency Department complaining of pain everywhere mainly localized to her chest and right shoulder for the past 4 days. Patient has been sleeping at a group home and states the bed is very uncomfortable, and when she has these pains she has been having difficulty sleeping. She denies any recent falls or injuries to the area. She denies the pain occurring after eating.     REVIEW OF SYSTEMS  See HPI for further details. All other systems are negative.     PAST MEDICAL HISTORY   has a past medical history of Arthritis; Asthma; Dyslipidemia (12/27/2016); GERD (gastroesophageal reflux disease) (12/27/2016); History of seizures (12/27/2016); Hypertension; Hypothyroidism (12/27/2016); Mental retardation, mild (I.Q. 50-70) (12/27/2016); and Tobacco dependence (12/27/2016).    SURGICAL HISTORY  patient denies any surgical history    SOCIAL HISTORY  Social History   Substance Use Topics   • Smoking status: Current Every Day Smoker     Packs/day: 1.00     Years: 12.00   • Smokeless tobacco: Never Used   • Alcohol use No      History   Drug Use No       FAMILY HISTORY  Family History   Problem Relation Age of Onset   • Cancer Neg Hx        CURRENT MEDICATIONS  Home Medications     Reviewed by Huber Burris R.N. (Registered Nurse) on 04/27/19 at 1828  Med List Status: Partial   Medication Last Dose Status   cyclobenzaprine (FLEXERIL) 10 MG Tab  Active   ibuprofen (MOTRIN) 800 MG Tab  Active   levothyroxine (SYNTHROID) 50 MCG Tab  Active   loratadine (CLARITIN) 10 MG Tab  " "Active   Magnesium Sulfate, Laxative, (EPSOM SALT PO)  Active   nicotine polacrilex (NICORETTE) 4 MG gum  Active   nicotine polacrilex (NICORETTE) 4 MG gum  Active   olopatadine (PATANOL) 0.1 % ophthalmic solution  Active   Omega-3 Fatty Acids (FISH OIL) 500 MG Cap  Active   omeprazole (PRILOSEC) 20 MG delayed-release capsule  Active   paliperidone (INVEGA) 3 MG ER tablet  Active   polyethylene glycol/lytes (MIRALAX) Pack  Active   sennosides (SENOKOT) 8.6 MG Tab  Active                ALLERGIES  No Known Allergies    PHYSICAL EXAM  VITAL SIGNS: /88   Pulse 78   Temp 36.6 °C (97.8 °F)   Resp 14   Ht 1.803 m (5' 11\")   Wt (!) 127.4 kg (280 lb 13.9 oz)   SpO2 95%   BMI 39.17 kg/m²       Constitutional: Alert in no apparent distress.  HENT: Normocephalic, Atraumatic, Bilateral external ears normal. Nose normal.   Eyes: Pupils are equal and reactive. Conjunctiva normal, non-icteric.   Heart: Regular rate and rhythm, no murmurs.    Chest/Lungs: Pinpoint tenderness to right sternal border. Clear to auscultation bilaterally.  Skin: Warm, Dry, No erythema, No rash.   Neurologic: Alert, Grossly non-focal.   Psychiatric: Affect normal, Judgment normal, Mood normal, Appears appropriate and not intoxicated.     EKG  12 Lead EKG interpreted by me to show:  Normal sinus rhythm with a rate of 71 with no ST elevation depression or T wave inversion. Impression: Nonischemic EKG.    RADIOLOGY  DX-CHEST-PORTABLE (1 VIEW)   Final Result         1. No acute cardiopulmonary abnormalities are identified.         The radiologist's interpretation of all radiological studies have been reviewed by me.    COURSE & MEDICAL DECISION MAKING  Pertinent Labs & Imaging studies reviewed. (See chart for details)    This is a 43 y.o. female that presents with generalized pain, mostly located to right sternal border, with tenderness to palpation here.  This could have recent costochondritis.  She does not have pleuritic pain to suggest PE.  " There is no exertional pain to suggest cardiac.  I will get an x-ray as well as an EKG to evaluate the patient    6:47 PM - Patient seen and examined at bedside. Ordered Chest xray, EKG.      8:09 PM - Reviewed EKG and chest xray. Normal EKG that was nonischemic. Chest xray did not indicated any acute processes. I informed patient of the results and recommended tylenol and motrin for her pains. She understands and agrees to the discharge plan of care.  Patient has a very low heart score and I would give her 0.  I do not feel there is a need to check troponin given the patient's presentation.    The patient will return for new or worsening symptoms and is stable at the time of discharge.      DISPOSITION:  Patient will be discharged home in stable condition.    FOLLOW UP:  Cong Perez M.D.  [address] [address]  [phone]    In 2 days        OUTPATIENT MEDICATIONS:  None    FINAL IMPRESSION  1. Chest wall pain          IYemi (Kylee), am scribing for, and in the presence of, Eder Chaparro M.D..    Electronically signed by: Yemi Lopez (Elizabethibmichael), 4/27/2019    IEder M.D. personally performed the services described in this documentation, as scribed by Yemi Lopez in my presence, and it is both accurate and complete. C.    The note accurately reflects work and decisions made by me.  Eder Chaparro  4/27/2019  8:21 PM

## 2019-04-28 NOTE — ED TRIAGE NOTES
"Frances Ardon  .  Chief Complaint   Patient presents with   • Other     patient c/o \"I have pain everywhere when I lay on the couch and my bed\".      Patient EFREN LANDERS from home with above complaint. Patient c/o right shoulder \"bone pain\" when laying down, patient with full ROM in triage, + CMS.Patient states \"I would just like to get a full check to make sure I doing good\". .Blood Pressure: 149/88, Pulse: 78, Respiration: 14, Temperature: 36.6 °C (97.8 °F), Height: 180.3 cm (5' 11\"), Weight: (!) 127.4 kg (280 lb 13.9 oz), Pulse Oximetry: 95 %, O2 (LPM): 0, O2 Delivery: None (Room Air)    Patient to lobby and instructed to inform staff of any needs.  "

## 2019-05-20 NOTE — TELEPHONE ENCOUNTER
Was the patient seen in the last year in this department? Yes   Last seen: 04/23/19 by Dr. Perez  Next appt: 05/24/19 with Dr. Perez      Does patient have an active prescription for medications requested? No     Received Request Via: Pharmacy

## 2019-05-22 RX ORDER — OMEPRAZOLE 20 MG/1
CAPSULE, DELAYED RELEASE ORAL
Qty: 90 CAP | Refills: 3 | Status: SHIPPED | OUTPATIENT
Start: 2019-05-22 | End: 2019-11-02

## 2019-05-22 RX ORDER — OMEGA-3/DHA/EPA/FISH OIL 60 MG-90MG
1 CAPSULE ORAL DAILY
Qty: 90 CAP | Refills: 3 | Status: SHIPPED | OUTPATIENT
Start: 2019-05-22 | End: 2019-11-02

## 2019-05-23 DIAGNOSIS — M25.572 LEFT ANKLE PAIN, UNSPECIFIED CHRONICITY: ICD-10-CM

## 2019-05-23 NOTE — TELEPHONE ENCOUNTER
Last seen: 04/23/19 by Dr. Perez  Next appt: 05/24/19 with Dr. Abebe    Was the patient seen in the last year in this department? Yes   Does patient have an active prescription for medications requested? No   Received Request Via: Pharmacy

## 2019-05-24 ENCOUNTER — APPOINTMENT (OUTPATIENT)
Dept: INTERNAL MEDICINE | Facility: MEDICAL CENTER | Age: 44
End: 2019-05-24
Payer: MEDICARE

## 2019-05-28 RX ORDER — IBUPROFEN 800 MG/1
800 TABLET ORAL EVERY 8 HOURS PRN
Qty: 30 TAB | Refills: 0 | Status: SHIPPED | OUTPATIENT
Start: 2019-05-28 | End: 2019-06-27

## 2019-06-20 ENCOUNTER — OFFICE VISIT (OUTPATIENT)
Dept: INTERNAL MEDICINE | Facility: MEDICAL CENTER | Age: 44
End: 2019-06-20
Payer: MEDICARE

## 2019-06-20 VITALS
WEIGHT: 278 LBS | HEART RATE: 71 BPM | TEMPERATURE: 97.9 F | OXYGEN SATURATION: 96 % | HEIGHT: 71 IN | DIASTOLIC BLOOD PRESSURE: 76 MMHG | BODY MASS INDEX: 38.92 KG/M2 | SYSTOLIC BLOOD PRESSURE: 112 MMHG

## 2019-06-20 DIAGNOSIS — R73.03 PREDIABETES: ICD-10-CM

## 2019-06-20 DIAGNOSIS — E78.5 DYSLIPIDEMIA: ICD-10-CM

## 2019-06-20 PROCEDURE — 99213 OFFICE O/P EST LOW 20 MIN: CPT | Mod: GE | Performed by: INTERNAL MEDICINE

## 2019-06-20 NOTE — PROGRESS NOTES
Established Patient    Frances presents today with the following:    CC: Post ED visit Follow up    HPI:     43 yof with medical history of mild intellectual delay, obesity, prediabetes, dyslipidemia, hypothyroidism and nicotine dependence , accompanied with her , is here her for Follow-up of ED visit on 4/27/2019, when she had presented with severe pain all over the body and was given ibuprofen as  work-up was noncontributory except for costochondritis.  EKG and chest x-ray were normal.    This morning when she woke up she was feeling pain lower abdomen, she got her menstrual cycles today and the flow is heavy.  Her last TSH was normal.  She is not sexually active.  She does not abuse any drugs but smokes every day half pack, and does not want to quit, she has discontinued nicotine gums which was prescribed in the previous visit.        Patient Active Problem List    Diagnosis Date Noted   • Prediabetes 04/23/2019   • BMI 40.0-44.9, adult ([facility]) 03/20/2019   • Morbid obesity ([facility]) 05/25/2017   • Tobacco dependence 12/27/2016   • Hypothyroidism 12/27/2016   • GERD (gastroesophageal reflux disease) 12/27/2016   • Mental retardation, mild (I.Q. 50-70) 12/27/2016   • Dyslipidemia 12/27/2016   • History of seizures 12/27/2016       Current Outpatient Prescriptions   Medication Sig Dispense Refill   • ibuprofen (MOTRIN) 800 MG Tab Take 1 Tab by mouth every 8 hours as needed for Mild Pain for up to 30 days. 30 Tab 0   • Omega-3 Fatty Acids (FISH OIL) 500 MG Cap Take 1 Cap by mouth every day. 90 Cap 3   • omeprazole (PRILOSEC) 20 MG delayed-release capsule TAKE 1 CAPSULE BY MOUTH 1 TIME DAILY 90 Cap 3   • levothyroxine (SYNTHROID) 50 MCG Tab Take 1 Tab by mouth Every morning on an empty stomach. 90 Tab 0   • loratadine (CLARITIN) 10 MG Tab Take 10 mg by mouth every day. TAKE 1 TAB BY MOUTH EVERY DAY.  0   • olopatadine (PATANOL) 0.1 % ophthalmic solution Place 1 Drop in both eyes 2 times a day.     •  "sennosides (SENOKOT) 8.6 MG Tab Take 8.6 mg by mouth 1 time daily as needed.     • paliperidone (INVEGA) 3 MG ER tablet Take  by mouth.       No current facility-administered medications for this visit.        ROS: As per HPI. Additional pertinent symptoms as noted below.    Negative for fever  Negative for chills  Negative for urinary urgency or frequency  Negative for nausea, vomiting, diarrhea    /76 (BP Location: Left arm, Patient Position: Sitting, BP Cuff Size: Adult)   Pulse 71   Temp 36.6 °C (97.9 °F) (Temporal)   Ht 1.803 m (5' 11\")   Wt (!) 126.1 kg (278 lb)   SpO2 96%   BMI 38.77 kg/m²     Physical Exam   Constitutional:  oriented to person, place, and time. No distress.   Eyes: Pupils are equal, round, and reactive to light. No scleral icterus.  Neck: Neck supple. No thyromegaly present.   Cardiovascular: Normal rate, regular rhythm and normal heart sounds.  Exam reveals no gallop and no friction rub.  No murmur heard.  Pulmonary/Chest: Breath sounds normal. Chest wall is not dull to percussion.   Musculoskeletal:   no edema.   Lymphadenopathy: no cervical adenopathy  Neurological: alert and oriented to person, place, and time.   Skin: No cyanosis. Nails show no clubbing.  Other: Mood and affect normal      Assessment and Plan  43-year-old female with costochondritis post ED visit in April 2019 is for follow-up    1. BMI 40.0- 8% 9, adult (HCC)  2. Prediabetes  Hemoglobin A1c 6.0 on 4/9/2019  Body mass index is 38.77 kg/m².    Patient and  advised for diet low in carbs and sugars and regular exercise    3. Dyslipidemia    Lab Results   Component Value Date/Time    CHOLSTRLTOT 211 (H) 04/09/2019 11:58 AM     (H) 04/09/2019 11:58 AM    HDL 38 (A) 04/09/2019 11:58 AM    TRIGLYCERIDE 108 04/09/2019 11:58 AM       Lab Results   Component Value Date/Time    SODIUM 141 09/06/2018 11:42 AM    POTASSIUM 3.9 09/06/2018 11:42 AM    CHLORIDE 107 09/06/2018 11:42 AM    CO2 26 " 09/06/2018 11:42 AM    GLUCOSE 88 09/06/2018 11:42 AM    BUN 7 (L) 09/06/2018 11:42 AM    CREATININE 0.88 09/06/2018 11:42 AM     Lab Results   Component Value Date/Time    ALKPHOSPHAT 78 09/06/2018 11:42 AM    ASTSGOT 23 09/06/2018 11:42 AM    ALTSGPT 34 09/06/2018 11:42 AM    TBILIRUBIN 0.7 09/06/2018 11:42 AM      Patient and  advised for diet low in carbs and sugars and regular exercise.  ASCVD score 4.9%  Statins not indicated    Lower abdominal pain;  This morning when she woke up she was feeling pain lower abdomen, she got her menstrual cycles today and the flow is heavy.  Her last TSH was normal.  She is not sexually active.  No incontinence of urine reported    Clinical exam negative for any costovertebral tenderness tenderness  No suprapubic tenderness  No signs of urinary tract infection  Advised  Hygiene  Ibuprofen as needed  Return to clinic if she develops any dysuria or fever or costovertebral angle pain.          Signed by: Gus Mckinney M.D.

## 2019-07-20 NOTE — NUR
PT PROVIDED MULTIPLE CUPS OF WATER AND STILL UNABLE TO PROVIDE URINE SAMPLE. 
STRAIGHT CATH URINE SAMPLE OBTAINED AND WALKED TO LAB

## 2019-07-20 NOTE — NUR
PT STATES SOB ESPECIALLY AT NIGHT. PT ALSO SAYS SHE HAS A DIFFICULT TIME 
URINATING. PT UNABLE TO URINATE AT THIS TIME. TO GIVE PT WATER

## 2019-09-23 ENCOUNTER — APPOINTMENT (OUTPATIENT)
Dept: RADIOLOGY | Facility: MEDICAL CENTER | Age: 44
End: 2019-09-23
Attending: EMERGENCY MEDICINE
Payer: COMMERCIAL

## 2019-09-23 ENCOUNTER — HOSPITAL ENCOUNTER (EMERGENCY)
Facility: MEDICAL CENTER | Age: 44
End: 2019-09-23
Attending: EMERGENCY MEDICINE
Payer: COMMERCIAL

## 2019-09-23 VITALS
HEART RATE: 71 BPM | RESPIRATION RATE: 16 BRPM | DIASTOLIC BLOOD PRESSURE: 91 MMHG | BODY MASS INDEX: 41.47 KG/M2 | TEMPERATURE: 98.6 F | OXYGEN SATURATION: 96 % | HEIGHT: 69 IN | WEIGHT: 279.98 LBS | SYSTOLIC BLOOD PRESSURE: 138 MMHG

## 2019-09-23 DIAGNOSIS — S93.601A SPRAIN OF RIGHT FOOT, INITIAL ENCOUNTER: ICD-10-CM

## 2019-09-23 PROCEDURE — 99283 EMERGENCY DEPT VISIT LOW MDM: CPT

## 2019-09-23 PROCEDURE — 73610 X-RAY EXAM OF ANKLE: CPT | Mod: RT

## 2019-09-23 PROCEDURE — 73630 X-RAY EXAM OF FOOT: CPT | Mod: RT

## 2019-09-23 ASSESSMENT — LIFESTYLE VARIABLES: DO YOU DRINK ALCOHOL: NO

## 2019-09-23 NOTE — ED PROVIDER NOTES
"ED Provider Note      CHIEF COMPLAINT   Chief Complaint   Patient presents with   • Foot Pain   • T-5000       HPI   Frances Ardon is a 43 y.o. female who presents with right foot and ankle pain.  Patient was at work at lunch break.  She stepped off a curb and twisted her ankle.  Does not know exactly what happened.  She has pain diffusely over her foot near the arch of the foot and dorsal foot.  Throbbing.  Worse with any sort of ambulation.  No swelling.  No fever.  No other blunt trauma.    REVIEW OF SYSTEMS   Pertinent negative: No numbness tingling weakness laceration    PAST MEDICAL HISTORY   Past Medical History:   Diagnosis Date   • Arthritis    • Asthma    • Dyslipidemia 12/27/2016   • GERD (gastroesophageal reflux disease) 12/27/2016   • History of seizures 12/27/2016   • Hypertension    • Hypothyroidism 12/27/2016   • Mental retardation, mild (I.Q. 50-70) 12/27/2016   • Tobacco dependence 12/27/2016       SOCIAL HISTORY  Social History     Tobacco Use   • Smoking status: Current Every Day Smoker     Packs/day: 0.25     Years: 12.00     Pack years: 3.00   • Smokeless tobacco: Never Used   Substance Use Topics   • Alcohol use: No     Alcohol/week: 0.0 oz   • Drug use: No       ALLERGIES   See chart    PHYSICAL EXAM  VITAL SIGNS: /97   Pulse 73   Temp 36.5 °C (97.7 °F) (Temporal)   Resp 14   Ht 1.753 m (5' 9\")   Wt (!) 127 kg (279 lb 15.8 oz)   SpO2 96%   BMI 41.35 kg/m²   Head: Atraumatic  Eyes: Eyes normal inspection  Neck: has full range of motion, normal inspection.  Constitutional: No acute distress   Cardiovascular: Normal heart rate. Pulses strong dorsalis pedis  Thorax & Lungs: No respiratory distress  Skin: Intact  Musculoskeletal: Mild tenderness diffusely over the right foot.  No remarkable tenderness over the malleoli.  No swelling deformity or ecchymosis.  Compartments soft.  Neurologic:  Normal sensory and motor    RADIOLOGY/PROCEDURES  DX-ANKLE 3+ VIEWS RIGHT   Final " Result      No acute osseous abnormality.      DX-FOOT-COMPLETE 3+ RIGHT   Final Result      1.  No acute osseous abnormality.   2.  Calcaneal bone spur.         Imaging is interpreted by radiologist reviewed by me    COURSE & MEDICAL DECISION MAKING  Patient presents with right foot and ankle pain.  She does not have any tenderness over the ankle.  She has a difficult time describing her symptoms.  I obtained x-rays for good measure and these films are negative.  Of advised rest ice elevate.  We will give an air stirrup splint.  Advise follow-up with primary in 1 week if persistent symptoms.  Return to the ER for fevers, worsening, not improving or concern.    FINAL IMPRESSION  1.  Right ankle/foot sprain      This dictation was created using voice recognition software. The accuracy of the dictation is limited to the abilities of the software. I expect there may be some errors of grammar and possibly content. The nursing notes were reviewed and certain aspects of this information were incorporated into this note.      Electronically signed by: Shakir Staley, 9/23/2019 2:46 PM

## 2019-09-23 NOTE — LETTER
"  FORM C-4:  EMPLOYEE’S CLAIM FOR COMPENSATION/ REPORT OF INITIAL TREATMENT  EMPLOYEE’S CLAIM - PROVIDE ALL INFORMATION REQUESTED   First Name  Frances Last Name  Duglas Birthdate             Age  1975 43 y.o. Sex  female Claim Number   Home Employee Address  [address] [facility]  [address]  [place]                                     Zip  [address] Height  1.753 m (5' 9\") Weight  (!) 127 kg (279 lb 15.8 oz) [employer]     Mailing Employee Address                           [address] [facility] [facility]  [address]   [place]               Zip  [address] Telephone  [phone] (home) [phone] (work) Primary Language Spoken  ENGLISH   Insurer  [employer] [facility]   Third Party   [employer] CLAIMS Employee's Occupation (Job Title) When Injury or Occupational Disease Occurred  Pricing etc.   Employer's Name  [employer] Telephone  [phone]    Employer Address  [address]] Zip  [address]   Date of Injury  9/23/2019       Hour of Injury  11:15 AM Date Employer Notified  9/23/2019 Last Day of Work after Injury or Occupational Disease  9/23/2019 Supervisor to Whom Injury Reported  [employer]   Address or Location of Accident (if applicable)  [[place] outside [facility]]   What were you doing at the time of accident? (if applicable)  Walking/Stepping off [employer]    How did this injury or occupational disease occur? Be specific and answer in detail. Use additional sheet if necessary)  Stepped Down-Did not fall-Twisted ankle   If you believe that you have an occupational disease, when did you first have knowledge of the disability and it relationship to your employment?   Witnesses to the Accident       Nature of Injury or Occupational Disease  Workers' Compensation  Part(s) of Body Injured or Affected  Ankle (R), N/A, N/A    I certify that the above is true and correct to the best of my knowledge and that I have provided this information in " order to obtain the benefits of [place]  I hereby authorize any physician, chiropractor, surgeon, practitioner, or other person, any hospital, including [facility] or [facility] hospital, any medical service organization, any insurance company, or other institution or organization to release to each other, any medical or other information, including benefits paid or payable, pertinent to this injury or disease, except information relative to diagnosis, treatment and/or counseling for AIDS, psychological conditions, alcohol or controlled substances, for which I must give specific authorization.  A Photostat of this authorization shall be as valid as the original.   Date  09/23/2019 Place  [facility] Employee’s Signature   THIS REPORT MUST BE COMPLETED AND MAILED WITHIN 3 WORKING DAYS OF TREATMENT   Place  [facility], EMERGENCY DEPT  Name of Facility   [facility]   Date  9/23/2019 Diagnosis  (S93.601A) Sprain of right foot, initial encounter Is there evidence the injured employee was under the influence of alcohol and/or another controlled substance at the time of accident?   Hour  3:42 PM Description of Injury or Disease  Sprain of right foot, initial encounter No   Treatment  Air stirrup splint  Have you advised the patient to remain off work five days or more?         No   X-Ray Findings  Negative   If Yes   From Date    To Date      From information given by the employee, together with medical evidence, can you directly connect this injury or occupational disease as job incurred?  Yes If No, is the employee capable of: Full Duty  Yes Modified Duty      Is additional medical care by a physician indicated?  Yes If Modified Duty, Specify any Limitations / Restrictions        Do you know of any previous injury or disease contributing to this condition or occupational  "disease?  No   Date  9/23/2019 Print Doctor’s Name  Ramiro Staley certify the employer’s copy of this form was mailed on:   Address  [address] [address] [phone] Insurer’s Use Only   [place]  [address]    Provider’s Tax ID Number    Telephone  Dept: [phone]    Doctor’s Signature  e-RAMIRO Triplett M.D. Degree   MD    Original - TREATING PHYSICIAN OR CHIROPRACTOR   Pg 2-Insurer/TPA   Pg 3-Employer   Pg 4-Employee                                                                                                  Form C-4 (rev01/03)     BRIEF DESCRIPTION OF RIGHTS AND BENEFITS  (Pursuant to [place] 616[place])  Notice of Injury or Occupational Disease (Incident Report Form C-1): If an injury or occupational disease (OD) arises out of and in the course of employment, you must provide written notice to your employer as soon as practicable, but no later than 7 days after the accident or OD. Your employer shall maintain a sufficient supply of the required forms.  Claim for Compensation (Form C-4): If medical treatment is sought, the form C-4 is available at the place of initial treatment. A completed \"Claim for Compensation\" (Form C-4) must be filed within 90 days after an accident or OD. The treating physician or chiropractor must, within 3 working days after treatment, complete and mail to the employer, the employer's insurer and third-party , the Claim for Compensation.  Medical Treatment: If you require medical treatment for your on-the-job injury or OD, you may be required to select a physician or chiropractor from a list provided by your workers’ compensation insurer, if it has contracted with an Organization for Managed Care (MCO) or Preferred Provider Organization (PPO) or providers of health care. If your employer has not entered into a contract with an MCO or PPO, you may select a physician or chiropractor from the Panel of Physicians and " Chiropractors. Any medical costs related to your industrial injury or OD will be paid by your insurer.  Temporary Total Disability (TTD): If your doctor has certified that you are unable to work for a period of at least 5 consecutive days, or 5 cumulative days in a 20-day period, or places restrictions on you that your employer does not accommodate, you may be entitled to TTD compensation.  Temporary Partial Disability (TPD): If the wage you receive upon reemployment is less than the compensation for TTD to which you are entitled, the insurer may be required to pay you TPD compensation to make up the difference. TPD can only be paid for a maximum of 24 months.  Permanent Partial Disability (PPD): When your medical condition is stable and there is an indication of a PPD as a result of your injury or OD, within 30 days, your insurer must arrange for an evaluation by a rating physician or chiropractor to determine the degree of your PPD. The amount of your PPD award depends on the date of injury, the results of the PPD evaluation and your age and wage.  Permanent Total Disability (PTD): If you are medically certified by a treating physician or chiropractor as permanently and totally disabled and have been granted a PTD status by your insurer, you are entitled to receive monthly benefits not to exceed 66 2/3% of your average monthly wage. The amount of your PTD payments is subject to reduction if you previously received a PPD award.  Vocational Rehabilitation Services: You may be eligible for vocational rehabilitation services if you are unable to return to the job due to a permanent physical impairment or permanent restrictions as a result of your injury or occupational disease.  Transportation and Per Linnea Reimbursement: You may be eligible for travel expenses and per linnea associated with medical treatment.  Reopening: You may be able to reopen your claim if your condition worsens after claim closure.  Appeal Process:  If you disagree with a written determination issued by the insurer or the insurer does not respond to your request, you may appeal to the Department of Administration, , by following the instructions contained in your determination letter. You must appeal the determination within 70 days from the date of the determination letter at [address], [address], [place], [place] [address], or [address], [address], [place], [place] [address]. If you disagree with the  decision, you may appeal to the Department of Administration, . You must file your appeal within 30 days from the date of the  decision letter at [address], [address], [place], [place] [address], or [address], [address], [place], [place] [address]. If you disagree with a decision of an , you may file a petition for judicial review with the District Court. You must do so within 30 days of the Appeal Officer’s decision. You may be represented by an  at your own expense or you may contact the [facility] for possible representation.  [place]  for Injured Workers (NAIW): If you disagree with a  decision, you may request that NAIW represent you without charge at an  Hearing. For information regarding denial of benefits, you may contact the [facility] at: [address], [address], [place] [address], [phone], or [address], [address], [place] [address], [phone]  To File a Complaint with the Division: If you wish to file a complaint with the  of the Division of Industrial Relations (DIR), please contact the Workers’ Compensation Section, [address], [address], [place], [place] [address], telephone [phone], or [address], [place] [address], telephone [phone].  For assistance with Workers’ Compensation Issues: you  may contact the Office of the Governor Consumer Health Assistance, [address] [place] [address], [address], [place] [address], Toll Free 1-676.881.3019, Web site: http://govcha.[facility].[place].us, E-mail [email or web address].                                                                                                                                                                               __________________________________________________________________                                    _________________            Employee Name / Signature                                                                                                                            Date                                       D-2 (rev. 10/07)

## 2019-09-23 NOTE — ED TRIAGE NOTES
"Frances Ardon 43 y.o. female to triage via w/c for     Chief Complaint   Patient presents with   • Foot Pain   • T-5000     Pt reports \"I was stepping down of a miriam and I twisted onto my foot and it popped\".  Pt reports she is able to step on it but not bear full weight.  /97   Pulse 73   Temp 36.5 °C (97.7 °F) (Temporal)   Resp 14   Ht 1.753 m (5' 9\")   Wt (!) 127 kg (279 lb 15.8 oz)   SpO2 96%   BMI 41.35 kg/m²   Pt returned to the lobby to await bed assignment.  Advised to return to the triage desk for any changes/concerns.  "

## 2019-10-03 ENCOUNTER — HOSPITAL ENCOUNTER (OUTPATIENT)
Dept: LAB | Facility: MEDICAL CENTER | Age: 44
End: 2019-10-03
Attending: NURSE PRACTITIONER
Payer: MEDICARE

## 2019-10-03 ENCOUNTER — HOSPITAL ENCOUNTER (OUTPATIENT)
Dept: LAB | Facility: MEDICAL CENTER | Age: 44
End: 2019-10-03
Attending: STUDENT IN AN ORGANIZED HEALTH CARE EDUCATION/TRAINING PROGRAM
Payer: MEDICARE

## 2019-10-10 ENCOUNTER — HOSPITAL ENCOUNTER (OUTPATIENT)
Facility: MEDICAL CENTER | Age: 44
End: 2019-10-10
Attending: OBSTETRICS & GYNECOLOGY
Payer: MEDICARE

## 2019-10-10 ENCOUNTER — GYNECOLOGY VISIT (OUTPATIENT)
Dept: OBGYN | Facility: CLINIC | Age: 44
End: 2019-10-10
Payer: MEDICARE

## 2019-10-10 VITALS — DIASTOLIC BLOOD PRESSURE: 80 MMHG | WEIGHT: 285 LBS | SYSTOLIC BLOOD PRESSURE: 126 MMHG | BODY MASS INDEX: 42.09 KG/M2

## 2019-10-10 DIAGNOSIS — N61.0 BREAST INFECTION: ICD-10-CM

## 2019-10-10 DIAGNOSIS — N89.8 VAGINAL DISCHARGE: ICD-10-CM

## 2019-10-10 PROCEDURE — 87591 N.GONORRHOEAE DNA AMP PROB: CPT

## 2019-10-10 PROCEDURE — 87491 CHLMYD TRACH DNA AMP PROBE: CPT

## 2019-10-10 PROCEDURE — 99213 OFFICE O/P EST LOW 20 MIN: CPT | Performed by: OBSTETRICS & GYNECOLOGY

## 2019-10-10 NOTE — NON-PROVIDER
Pt here c/o lump in her  left Breast  and sore outside vagina  Good# [phone]  Pharmacy verified

## 2019-10-10 NOTE — PROGRESS NOTES
Chief complaint; new patient    Frances Ardon is a 43 y.o.  who presents complaining of lesion on her left breast.  Patient went to the urgent care at [facility] for this lesion and was placed on oral antibiotics no records are available.  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 50-70) 2016   • Tobacco dependence 2016     Past surgical history-No past surgical history on file.  Allergies-Patient has no known allergies.  Medications-  Current Outpatient Medications on File Prior to Visit   Medication Sig Dispense Refill   • Omega-3 Fatty Acids (FISH OIL) 500 MG Cap Take 1 Cap by mouth every day. 90 Cap 3   • omeprazole (PRILOSEC) 20 MG delayed-release capsule TAKE 1 CAPSULE BY MOUTH 1 TIME DAILY 90 Cap 3   • levothyroxine (SYNTHROID) 50 MCG Tab Take 1 Tab by mouth Every morning on an empty stomach. 90 Tab 0   • loratadine (CLARITIN) 10 MG Tab Take 10 mg by mouth every day. TAKE 1 TAB BY MOUTH EVERY DAY.  0   • olopatadine (PATANOL) 0.1 % ophthalmic solution Place 1 Drop in both eyes 2 times a day.     • sennosides (SENOKOT) 8.6 MG Tab Take 8.6 mg by mouth 1 time daily as needed.     • paliperidone (INVEGA) 3 MG ER tablet Take  by mouth.       No current facility-administered medications on file prior to visit.      Social history-  Social History     Socioeconomic History   • Marital status: Single     Spouse name: Not on file   • Number of children: Not on file   • Years of education: Not on file   • Highest education level: Not on file   Occupational History   •  Not on file   Social Needs   • Financial resource strain: Not on file   • Food insecurity:     Worry: Not on file     Inability: Not on file   • Transportation needs:     Medical: Not on file     Non-medical: Not on file   Tobacco Use   • Smoking status: Current Every Day Smoker     Packs/day: 0.25     Years: 12.00     Pack years: 3.00   • Smokeless tobacco: Never Used   Substance and Sexual Activity   • Alcohol use: No     Alcohol/week: 0.0 oz   • Drug use: No   • Sexual activity: Not Currently     Partners: Male   Lifestyle   • Physical activity:     Days per week: Not on file     Minutes per session: Not on file   • Stress: Not on file   Relationships   • Social connections:     Talks on phone: Not on file     Gets together: Not on file     Attends Manda[employer] service: Not on file     Active member of club or organization: Not on file     Attends meetings of clubs or organizations: Not on file     Relationship status: Not on file   • Intimate partner violence:     Fear of current or ex partner: Not on file     Emotionally abused: Not on file     Physically abused: Not on file     Forced sexual activity: Not on file   Other Topics Concern   • Not on file   Social History Narrative   • Not on file     Past Family History-no history of breast or ovarian cancer    Physical examination;  Alert and oriented x3  General a thin well-developed well-nourished female in no apparent distress  Vitals:    10/10/19 1610   BP: 126/80   Weight: (!) 129.3 kg (285 lb)     Skin is warm and dry  Neck-supple  HEENT-head-atraumatic, normocephalic, EOMI, PERRLA  Cardiovascular-regular rate and rhythm, normal S1-S2, no murmurs or gallops    Breast examination-small partially healed subcutaneous superficial boil on left breast underneath the breast.  No dominant masses no axillary adenopathy  Lungs-clear to auscultation bilaterally  Back-negative CVA tenderness  Abdomen-nondistended positive bowel sounds soft nontender no masses or  hepatosplenomegaly  Pelvic examination;  External genitalia-no visible lesions   Vagina-no blood or discharge  Cervix-no gross lesions and GC chlamydia cultures taken  Uterus-normal size and shape, nontender  Adnexa without mass or tenderness  Extremities without cyanosis clubbing or edema  Neurologic exam grossly intact    Impression;  Breast infection-subcutaneous boil  Vaginal discharge-rule out cervicitis    Plan;  Check GC chlamydia cultures  Needs mammogram  Needs Pap smear at next visit      30  Minutes spent with the patient in face-to-face contact, greater than 50% of the time spent on counseling and coordination of care. All questions answered in detail.

## 2019-10-12 ENCOUNTER — OFFICE VISIT (OUTPATIENT)
Dept: URGENT CARE | Facility: CLINIC | Age: 44
End: 2019-10-12
Payer: MEDICARE

## 2019-10-12 VITALS
DIASTOLIC BLOOD PRESSURE: 84 MMHG | HEART RATE: 73 BPM | OXYGEN SATURATION: 98 % | HEIGHT: 70 IN | WEIGHT: 279 LBS | BODY MASS INDEX: 39.94 KG/M2 | TEMPERATURE: 97.8 F | SYSTOLIC BLOOD PRESSURE: 128 MMHG | RESPIRATION RATE: 16 BRPM

## 2019-10-12 DIAGNOSIS — S96.912A MUSCLE STRAIN OF LEFT FOOT, INITIAL ENCOUNTER: ICD-10-CM

## 2019-10-12 PROCEDURE — 99203 OFFICE O/P NEW LOW 30 MIN: CPT | Performed by: PHYSICIAN ASSISTANT

## 2019-10-12 ASSESSMENT — ENCOUNTER SYMPTOMS
FEVER: 0
VOMITING: 0
SENSORY CHANGE: 0
FOCAL WEAKNESS: 0
NAUSEA: 0
CHILLS: 0
SHORTNESS OF BREATH: 0
TINGLING: 0

## 2019-10-14 LAB
C TRACH DNA SPEC QL NAA+PROBE: NEGATIVE
N GONORRHOEA DNA SPEC QL NAA+PROBE: NEGATIVE
SPECIMEN SOURCE: NORMAL

## 2019-10-28 ENCOUNTER — OFFICE VISIT (OUTPATIENT)
Dept: URGENT CARE | Facility: CLINIC | Age: 44
End: 2019-10-28
Payer: MEDICARE

## 2019-10-28 VITALS
SYSTOLIC BLOOD PRESSURE: 124 MMHG | BODY MASS INDEX: 40.18 KG/M2 | OXYGEN SATURATION: 97 % | TEMPERATURE: 98.4 F | DIASTOLIC BLOOD PRESSURE: 78 MMHG | HEART RATE: 68 BPM | RESPIRATION RATE: 16 BRPM | WEIGHT: 280 LBS

## 2019-10-28 DIAGNOSIS — Z23 NEED FOR IMMUNIZATION AGAINST INFLUENZA: ICD-10-CM

## 2019-10-28 DIAGNOSIS — R00.2 PALPITATIONS: ICD-10-CM

## 2019-10-28 DIAGNOSIS — M94.0 COSTOCHONDRITIS: ICD-10-CM

## 2019-10-28 PROCEDURE — 99214 OFFICE O/P EST MOD 30 MIN: CPT | Performed by: FAMILY MEDICINE

## 2019-10-28 RX ORDER — IBUPROFEN 800 MG/1
TABLET ORAL
COMMUNITY
Start: 2019-07-22 | End: 2022-02-16

## 2019-10-28 RX ORDER — OMEPRAZOLE 20 MG/1
20 CAPSULE, DELAYED RELEASE ORAL DAILY
COMMUNITY
End: 2020-03-25

## 2019-10-28 RX ORDER — OMEGA-3/DHA/EPA/FISH OIL 60 MG-90MG
CAPSULE ORAL
COMMUNITY
Start: 2014-11-25 | End: 2022-07-26

## 2019-10-28 RX ORDER — NAPROXEN 500 MG/1
500 TABLET ORAL 2 TIMES DAILY WITH MEALS
COMMUNITY
End: 2022-07-26

## 2019-10-29 PROCEDURE — 90662 IIV NO PRSV INCREASED AG IM: CPT | Performed by: FAMILY MEDICINE

## 2019-10-29 PROCEDURE — G0008 ADMIN INFLUENZA VIRUS VAC: HCPCS | Performed by: FAMILY MEDICINE

## 2019-10-30 NOTE — PROGRESS NOTES
Subjective:       presents with chest Pain            Chest Pain  This is a new problem. Episode onset: 4 hr ago. The onset quality is undetermined. The problem occurs intermittently. The problem is unchanged. The pain is present in the right side and left side (left and right ribcage). The pain is mild. The quality of the pain is described as sharp.  associated sx:  Heart palpitations.       The symptoms are aggravated by breathing. Pertinent negatives include no coughing, dizziness, fever, headaches, nausea, syncope, tingling or wheezing. Past treatments include nothing.   She has a hx of diagnosed costochondritis and she was recently seen by cardiologist and per pt, echo and other testing was unremarkable.     She states that this pain feels similar.    No hx of cardiac issues.         Social History     Tobacco Use   • Smoking status: Current Every Day Smoker     Packs/day: 0.25     Years: 12.00     Pack years: 3.00     Types: Cigarettes   • Smokeless tobacco: Never Used   Substance Use Topics   • Alcohol use: No     Alcohol/week: 0.0 oz   • Drug use: No         Past Medical History:   Diagnosis Date   • Arthritis    • Asthma    • Dyslipidemia 12/27/2016   • GERD (gastroesophageal reflux disease) 12/27/2016   • History of seizures 12/27/2016   • Hypertension    • Hypothyroidism 12/27/2016   • Mental retardation, mild (I.Q. 50-70) 12/27/2016   • Tobacco dependence 12/27/2016       Review of Systems   Constitutional: Negative for fever, chills and malaise/fatigue.   Eyes: Negative for vision changes, d/c.    Respiratory: Negative for cough and sputum production.    Cardiovascular: + for chest pain,  palpitations.   Gastrointestinal: Negative for nausea, vomiting, abdominal pain, diarrhea and constipation.   Genitourinary: Negative for dysuria, urgency and frequency.   Skin: Negative for rash or  itching.   Neurological: Negative for dizziness and tingling.   Psychiatric/Behavioral: Negative for depression.    Hematologic/lymphatic - denies bruising or excessive bleeding  All other systems reviewed and are negative.         Objective:     /78 (BP Location: Left arm, Patient Position: Sitting, BP Cuff Size: Large adult)   Pulse 68   Temp 36.9 °C (98.4 °F) (Temporal)   Resp 16   Wt (!) 127 kg (280 lb)   SpO2 97%     Physical Exam   Constitutional: patient is oriented to person, place, and time. Pt appears well-developed and well-nourished. No distress.   HENT:   Head: Normocephalic and atraumatic.   Eyes: Conjunctivae are normal. No scleral icterus.   Neck: Neck supple.   Cardiovascular: Normal rate, regular rhythm and normal heart sounds.  Exam reveals no gallop and no friction rub.    Pulmonary/Chest: Effort normal and breath sounds normal. No respiratory distress. Pt has no wheezes, rales. She exhibits tenderness (tender over costochondral junction on left and right).   Abdominal: Soft. Bowel sounds are normal. She exhibits no distension. There is no tenderness.   Neurological: pt is alert and oriented to person, place, and time. No cranial nerve deficit.   Skin: Skin is warm. Pt is not diaphoretic. No erythema.   Psychiatric: Patient's behavior is normal.   Nursing note and vitals reviewed.           EKG interpretation - normal sinus rhythm. No ST or QRS morphology changes. QT interval is normal. Axis is positive. No signs of ischemia.      Assessment/Plan:        1. Palpitations   EKG unremarkable - NSR  Advised f/u cardiologist        2. Costochondritis  rx motrin 800mg tid prn      Follow up in one week if no improvement, sooner if symptoms worsen.

## 2019-10-31 ENCOUNTER — HOSPITAL ENCOUNTER (OUTPATIENT)
Dept: RADIOLOGY | Facility: MEDICAL CENTER | Age: 44
End: 2019-10-31
Attending: OBSTETRICS & GYNECOLOGY
Payer: MEDICARE

## 2019-10-31 DIAGNOSIS — Z12.31 BREAST CANCER SCREENING BY MAMMOGRAM: ICD-10-CM

## 2019-10-31 DIAGNOSIS — N61.0 BREAST INFECTION: ICD-10-CM

## 2019-10-31 PROCEDURE — 77063 BREAST TOMOSYNTHESIS BI: CPT

## 2019-11-01 ENCOUNTER — HOSPITAL ENCOUNTER (EMERGENCY)
Facility: MEDICAL CENTER | Age: 44
End: 2019-11-02
Attending: EMERGENCY MEDICINE
Payer: MEDICARE

## 2019-11-01 ENCOUNTER — APPOINTMENT (OUTPATIENT)
Dept: RADIOLOGY | Facility: MEDICAL CENTER | Age: 44
End: 2019-11-01
Attending: EMERGENCY MEDICINE
Payer: MEDICARE

## 2019-11-01 VITALS
RESPIRATION RATE: 18 BRPM | OXYGEN SATURATION: 97 % | WEIGHT: 279.1 LBS | DIASTOLIC BLOOD PRESSURE: 84 MMHG | TEMPERATURE: 98.2 F | HEIGHT: 70 IN | BODY MASS INDEX: 39.96 KG/M2 | HEART RATE: 68 BPM | SYSTOLIC BLOOD PRESSURE: 133 MMHG

## 2019-11-01 DIAGNOSIS — R07.9 CHEST PAIN, UNSPECIFIED TYPE: ICD-10-CM

## 2019-11-01 DIAGNOSIS — R05.9 COUGH: ICD-10-CM

## 2019-11-01 LAB
ANION GAP SERPL CALC-SCNC: 7 MMOL/L (ref 0–11.9)
BASOPHILS # BLD AUTO: 0.8 % (ref 0–1.8)
BASOPHILS # BLD: 0.07 K/UL (ref 0–0.12)
BUN SERPL-MCNC: 10 MG/DL (ref 8–22)
CALCIUM SERPL-MCNC: 8.9 MG/DL (ref 8.5–10.5)
CHLORIDE SERPL-SCNC: 107 MMOL/L (ref 96–112)
CO2 SERPL-SCNC: 25 MMOL/L (ref 20–33)
CREAT SERPL-MCNC: 0.9 MG/DL (ref 0.5–1.4)
EKG IMPRESSION: NORMAL
EOSINOPHIL # BLD AUTO: 0.15 K/UL (ref 0–0.51)
EOSINOPHIL NFR BLD: 1.8 % (ref 0–6.9)
ERYTHROCYTE [DISTWIDTH] IN BLOOD BY AUTOMATED COUNT: 43.6 FL (ref 35.9–50)
GLUCOSE SERPL-MCNC: 108 MG/DL (ref 65–99)
HCT VFR BLD AUTO: 39.7 % (ref 37–47)
HGB BLD-MCNC: 13.7 G/DL (ref 12–16)
IMM GRANULOCYTES # BLD AUTO: 0.02 K/UL (ref 0–0.11)
IMM GRANULOCYTES NFR BLD AUTO: 0.2 % (ref 0–0.9)
LYMPHOCYTES # BLD AUTO: 2.87 K/UL (ref 1–4.8)
LYMPHOCYTES NFR BLD: 34 % (ref 22–41)
MCH RBC QN AUTO: 31.6 PG (ref 27–33)
MCHC RBC AUTO-ENTMCNC: 34.5 G/DL (ref 33.6–35)
MCV RBC AUTO: 91.7 FL (ref 81.4–97.8)
MONOCYTES # BLD AUTO: 0.55 K/UL (ref 0–0.85)
MONOCYTES NFR BLD AUTO: 6.5 % (ref 0–13.4)
NEUTROPHILS # BLD AUTO: 4.79 K/UL (ref 2–7.15)
NEUTROPHILS NFR BLD: 56.7 % (ref 44–72)
NRBC # BLD AUTO: 0 K/UL
NRBC BLD-RTO: 0 /100 WBC
PLATELET # BLD AUTO: 269 K/UL (ref 164–446)
PMV BLD AUTO: 10.1 FL (ref 9–12.9)
POTASSIUM SERPL-SCNC: 3.8 MMOL/L (ref 3.6–5.5)
RBC # BLD AUTO: 4.33 M/UL (ref 4.2–5.4)
SODIUM SERPL-SCNC: 139 MMOL/L (ref 135–145)
WBC # BLD AUTO: 8.5 K/UL (ref 4.8–10.8)

## 2019-11-01 PROCEDURE — 700102 HCHG RX REV CODE 250 W/ 637 OVERRIDE(OP): Performed by: EMERGENCY MEDICINE

## 2019-11-01 PROCEDURE — 36415 COLL VENOUS BLD VENIPUNCTURE: CPT

## 2019-11-01 PROCEDURE — 85025 COMPLETE CBC W/AUTO DIFF WBC: CPT

## 2019-11-01 PROCEDURE — 71045 X-RAY EXAM CHEST 1 VIEW: CPT

## 2019-11-01 PROCEDURE — 93005 ELECTROCARDIOGRAM TRACING: CPT

## 2019-11-01 PROCEDURE — 99284 EMERGENCY DEPT VISIT MOD MDM: CPT

## 2019-11-01 PROCEDURE — 93005 ELECTROCARDIOGRAM TRACING: CPT | Performed by: EMERGENCY MEDICINE

## 2019-11-01 PROCEDURE — 80048 BASIC METABOLIC PNL TOTAL CA: CPT

## 2019-11-01 PROCEDURE — A9270 NON-COVERED ITEM OR SERVICE: HCPCS | Performed by: EMERGENCY MEDICINE

## 2019-11-01 RX ORDER — ACETAMINOPHEN 325 MG/1
650 TABLET ORAL ONCE
Status: COMPLETED | OUTPATIENT
Start: 2019-11-02 | End: 2019-11-01

## 2019-11-01 RX ADMIN — ACETAMINOPHEN 650 MG: 325 TABLET, FILM COATED ORAL at 23:43

## 2019-11-01 ASSESSMENT — LIFESTYLE VARIABLES: DO YOU DRINK ALCOHOL: NO

## 2019-11-02 ENCOUNTER — OFFICE VISIT (OUTPATIENT)
Dept: URGENT CARE | Facility: CLINIC | Age: 44
End: 2019-11-02
Payer: MEDICARE

## 2019-11-02 VITALS
HEART RATE: 84 BPM | HEIGHT: 70 IN | SYSTOLIC BLOOD PRESSURE: 122 MMHG | WEIGHT: 280 LBS | TEMPERATURE: 97.6 F | OXYGEN SATURATION: 97 % | BODY MASS INDEX: 40.09 KG/M2 | RESPIRATION RATE: 16 BRPM | DIASTOLIC BLOOD PRESSURE: 70 MMHG

## 2019-11-02 DIAGNOSIS — G89.29 CHRONIC HEEL PAIN, LEFT: Primary | ICD-10-CM

## 2019-11-02 DIAGNOSIS — M79.672 CHRONIC HEEL PAIN, LEFT: Primary | ICD-10-CM

## 2019-11-02 PROCEDURE — 99213 OFFICE O/P EST LOW 20 MIN: CPT | Performed by: PHYSICIAN ASSISTANT

## 2019-11-02 RX ORDER — MEDICAL SUPPLY, MISCELLANEOUS
EACH MISCELLANEOUS
COMMUNITY
Start: 2019-08-08 | End: 2022-07-26

## 2019-11-02 RX ORDER — BENZONATATE 100 MG/1
CAPSULE ORAL
Refills: 0 | COMMUNITY
Start: 2019-09-25 | End: 2020-03-25

## 2019-11-02 RX ORDER — SENNOSIDES A AND B 8.6 MG/1
TABLET, FILM COATED ORAL
COMMUNITY
Start: 2015-09-30 | End: 2020-03-25

## 2019-11-02 RX ORDER — PALIPERIDONE 3 MG/1
TABLET, EXTENDED RELEASE ORAL
COMMUNITY
End: 2020-03-25

## 2019-11-02 RX ORDER — AZITHROMYCIN 250 MG/1
TABLET, FILM COATED ORAL
Refills: 0 | COMMUNITY
Start: 2019-09-25 | End: 2020-03-25

## 2019-11-02 RX ORDER — OLOPATADINE HYDROCHLORIDE 1 MG/ML
SOLUTION/ DROPS OPHTHALMIC
COMMUNITY
End: 2022-04-18

## 2019-11-02 RX ORDER — POLYETHYLENE GLYCOL 3350 17 G/17G
POWDER, FOR SOLUTION ORAL
COMMUNITY
Start: 2015-09-11 | End: 2022-07-26

## 2019-11-02 RX ORDER — LEVOTHYROXINE SODIUM 0.03 MG/1
TABLET ORAL
COMMUNITY
Start: 2019-02-05 | End: 2020-03-25

## 2019-11-02 RX ORDER — METRONIDAZOLE 500 MG/1
TABLET ORAL
Refills: 0 | COMMUNITY
Start: 2019-09-26 | End: 2020-03-25

## 2019-11-02 RX ORDER — LORATADINE 10 MG/1
TABLET ORAL
COMMUNITY
Start: 2018-03-27 | End: 2020-03-25

## 2019-11-02 RX ORDER — OMEPRAZOLE 20 MG/1
20 CAPSULE, DELAYED RELEASE ORAL DAILY
COMMUNITY
Start: 2019-05-22

## 2019-11-02 RX ORDER — ACETAMINOPHEN 500 MG
680 TABLET ORAL 3 TIMES DAILY
COMMUNITY
Start: 2019-08-08 | End: 2022-07-26

## 2019-11-02 RX ORDER — AMOXICILLIN 500 MG/1
CAPSULE ORAL
Refills: 0 | COMMUNITY
Start: 2019-08-26 | End: 2020-03-25

## 2019-11-02 RX ORDER — CEPHALEXIN 500 MG/1
CAPSULE ORAL
Refills: 0 | COMMUNITY
Start: 2019-09-26 | End: 2020-03-25

## 2019-11-02 NOTE — ED PROVIDER NOTES
ER Provider Note     Scribed for Eder Chaparro M.D. by Jessika Wren. 11/1/2019, 10:27 PM.    Primary Care Provider: Cong Perez M.D.  Means of Arrival: EMS   History obtained from: Patient  History limited by: None     CHIEF COMPLAINT  Chief Complaint   Patient presents with   • Cough   • Chest Pain     with coughing only    • Nausea       HPI  Frances Ardon is a 43 y.o. female who presents to the Emergency Department with complaints of a cough onset prior to arrival. She states that the cough is so severe that it's causing associated chest pain, nausea, and dizziness. The pain is only there while coughing. She denies any fevers or chills. She received her flu shot several days ago.    REVIEW OF SYSTEMS  See HPI for further details. All other systems are negative.     PAST MEDICAL HISTORY   has a past medical history of Arthritis, Asthma, Dyslipidemia (12/27/2016), GERD (gastroesophageal reflux disease) (12/27/2016), History of seizures (12/27/2016), Hypertension, Hypothyroidism (12/27/2016), Mental retardation, mild (I.Q. 50-70) (12/27/2016), and Tobacco dependence (12/27/2016).    SURGICAL HISTORY  patient denies any surgical history    SOCIAL HISTORY  Social History     Tobacco Use   • Smoking status: Current Every Day Smoker     Packs/day: 0.25     Years: 12.00     Pack years: 3.00     Types: Cigarettes   • Smokeless tobacco: Never Used   Substance Use Topics   • Alcohol use: No     Alcohol/week: 0.0 oz   • Drug use: No      Social History     Substance and Sexual Activity   Drug Use No       FAMILY HISTORY  Family History   Problem Relation Age of Onset   • Cancer Neg Hx        CURRENT MEDICATIONS  No current facility-administered medications for this encounter.     Current Outpatient Medications:   •  naproxen (NAPROSYN) 500 MG Tab, Take 500 mg by mouth 2 times a day, with meals., Disp: , Rfl:   •  Omega-3 Fatty Acids (FISH OIL) 500 MG Cap, FISH  MG CAPS, Disp: , Rfl:   •  ibuprofen  "(MOTRIN) 800 MG Tab, IBUPROFEN 800 MG TABS, Disp: , Rfl:   •  omeprazole (PRILOSEC) 20 MG delayed-release capsule, Take 20 mg by mouth every day., Disp: , Rfl:   •  Diclofenac Sodium 1 % Gel, Apply 1 Application to affected area(s) as needed., Disp: , Rfl:   •  Omega-3 Fatty Acids (FISH OIL) 500 MG Cap, Take 1 Cap by mouth every day., Disp: 90 Cap, Rfl: 3  •  omeprazole (PRILOSEC) 20 MG delayed-release capsule, TAKE 1 CAPSULE BY MOUTH 1 TIME DAILY, Disp: 90 Cap, Rfl: 3  •  levothyroxine (SYNTHROID) 50 MCG Tab, Take 1 Tab by mouth Every morning on an empty stomach., Disp: 90 Tab, Rfl: 0  •  loratadine (CLARITIN) 10 MG Tab, Take 10 mg by mouth every day. TAKE 1 TAB BY MOUTH EVERY DAY., Disp: , Rfl: 0  •  olopatadine (PATANOL) 0.1 % ophthalmic solution, Place 1 Drop in both eyes 2 times a day., Disp: , Rfl:   •  sennosides (SENOKOT) 8.6 MG Tab, Take 8.6 mg by mouth 1 time daily as needed., Disp: , Rfl:   •  paliperidone (INVEGA) 3 MG ER tablet, Take 3 mg by mouth., Disp: , Rfl:     ALLERGIES  No Known Allergies    PHYSICAL EXAM  VITAL SIGNS: /98   Pulse 71   Temp 36.6 °C (97.9 °F) (Temporal)   Resp 18   Ht 1.778 m (5' 10\")   Wt (!) 126.6 kg (279 lb 1.6 oz)   SpO2 98%   BMI 40.05 kg/m²       Constitutional: Alert in no apparent distress.  HENT: No signs of trauma, Bilateral external ears normal, Nose normal.   Eyes: Pupils are equal and reactive, Conjunctiva normal, Non-icteric.   Neck: Normal range of motion, No tenderness, Supple, No stridor.   Lymphatic: No lymphadenopathy noted.   Cardiovascular: Regular rate and rhythm, no murmurs.   Thorax & Lungs: Normal breath sounds, No respiratory distress, No wheezing, No chest tenderness.   Abdomen: Bowel sounds normal, Soft, No tenderness, No masses, No pulsatile masses. No peritoneal signs.  Skin: Warm, Dry, No erythema, No rash.   Back: No bony tenderness, No CVA tenderness.   Extremities: Intact distal pulses, No edema, No tenderness, No " cyanosis.  Musculoskeletal: Good range of motion in all major joints. No tenderness to palpation or major deformities noted.   Neurologic: Alert , Normal motor function, Normal sensory function, No focal deficits noted.   Psychiatric: Affect normal, Judgment normal, Mood normal.     DIAGNOSTIC STUDIES / PROCEDURES    EKG Interpretation:  Results for orders placed or performed during the hospital encounter of 19   EKG (Now)   Result Value Ref Range    Report       [facility] Emergency Dept.    Test Date:  2019  Pt Name:    MORALES GOODEN             Department: ER  MRN:        [id number]                      Room:  Gender:     Female                       Technician: [id number]  :        1975                   Requested By:ER TRIAGE PROTOCOL  Order #:    [id number]                    Reading MD: Eder Chaparro MD    Measurements  Intervals                                Axis  Rate:       73                           P:  WV:                                      QRS:        35  QRSD:       102                          T:          30  QT:         388  QTc:        428    Interpretive Statements  ACCELERATED JUNCTIONAL RHYTHM  ABNRM R PROG, CONSIDER ASMI OR LEAD PLACEMENT  Compared to ECG 2019 19:16:50  Accelerated junctional rhythm now present  Myocardial infarct finding now present  Sinus rhythm no longer present  Electronically Signed On 2019 23:49:19 PDT by Eder Chaparro MD         LABS  Labs Reviewed   BASIC METABOLIC PANEL - Abnormal; Notable for the following components:       Result Value    Glucose 108 (*)     All other components within normal limits   CBC WITH DIFFERENTIAL   ESTIMATED GFR     All labs reviewed by me.    RADIOLOGY  DX-CHEST-PORTABLE (1 VIEW)   Final Result      No acute cardiopulmonary abnormality.         The radiologist's interpretation of all radiological studies have been reviewed by me.    COURSE & MEDICAL DECISION MAKING  Pertinent Labs  & Imaging studies reviewed. (See chart for details)    This is a 43 y.o. female that presents with a cough onset prior to arrival. She complains of chest pain, nausea, and dizziness exacerbated by her coughing.   She is well-appearing at this time.  Patient is no longer dizzy.  We will evaluate for elect light derangement and anemia as well as kidney to evaluate the dizziness.  In addition we did a chest x-ray to evaluate for pneumonia.    10:27 PM - Patient seen and examined at bedside. Ordered EKG, DX-chest, CBC with differential, BMP.      11:39 PM - Ordered Tylenol.    11:45 PM - Reviewed lab and radiology results.      Patient has no pneumonia or leukocytosis.  She is no elect light derangements.  She is a nonischemic EKG with no evidence of syncope.    11:53 PM - Patient was reevaluated at bedside. Discussed lab and radiology results with the patient and informed them that there were no acute abnormalities. Discussed discharge instructions and return precautions with the patient. She is comfortable with discharge at this time.     The patient will return for new or worsening symptoms and is stable at the time of discharge.  The patient is referred to a primary physician for blood pressure management, diabetic screening, and for all other preventative health concerns.      DISPOSITION:  Patient will be discharged home in stable condition.    FOLLOW UP:  Cong Perez M.D.  [address] [place] [id number] [phone]    In 2 days        OUTPATIENT MEDICATIONS:  Discharge Medication List as of 11/1/2019 11:54 PM          FINAL IMPRESSION  1. Cough    2. Chest pain, unspecified type          I, Jessika Dodson), am scribing for, and in the presence of, Eder Chaparro M.D..    Electronically signed by: Jessika Dodson), 11/1/2019    IEder M.D. personally performed the services described in this documentation, as scribed by Jessika Wren in my presence, and it is both  accurate and complete. C    The note accurately reflects work and decisions made by me.  Eder Chaparro  11/2/2019  5:28 AM

## 2019-11-02 NOTE — PROGRESS NOTES
"Subjective:      Pt is a 43 y.o. female who presents with Foot Pain            HPI  This is a chronic issue. Patient comes clinic complaining last few hours of pain to left heel. Notes past medical history of surgery \"removing a bone spur\" to this left heel. Patient states she has been seen through [facility] in the last few weeks and x-rays were obtained and \"the bone spur has returned\". She complains of pain this morning. She comes to clinic today requesting a SMALLER boot to help with ambulation and help with work time on her feet. She notes no swelling. Denies new trauma or injury. Declines x-rays at today's evaluation. Simply requests a SMALLER boot. Pt has not taken any Rx medications for this condition. Pt states the pain is a 3/10, aching in nature and worse during the day with walking and notes the larger boot given at the last  visit makes her have difficulty climbing the stairs. night. Pt denies CP, SOB, NVD, paresthesias, headaches, dizziness, change in vision, hives, or other joint pain. The pt's medication list, problem list, and allergies have been evaluated and reviewed during today's visit.    PMH:  Past Medical History:   Diagnosis Date   • Arthritis    • Asthma    • Dyslipidemia 12/27/2016   • GERD (gastroesophageal reflux disease) 12/27/2016   • History of seizures 12/27/2016   • Hypertension    • Hypothyroidism 12/27/2016   • Mental retardation, mild (I.Q. 50-70) 12/27/2016   • Tobacco dependence 12/27/2016       PSH:  Negative per pt.      Fam Hx:  the patient's family history is not pertinent to their current complaint    Family Status   Relation Name Status   • Mo  Alive   • Neg Hx  (Not Specified)       Soc HX:  Social History     Socioeconomic History   • Marital status: Single     Spouse name: Not on file   • Number of children: Not on file   • Years of education: Not on file   • Highest education level: Not on file   Occupational History   • Not on file   Social Needs   • Financial " resource strain: Not on file   • Food insecurity:     Worry: Not on file     Inability: Not on file   • Transportation needs:     Medical: Not on file     Non-medical: Not on file   Tobacco Use   • Smoking status: Current Every Day Smoker     Packs/day: 0.25     Years: 12.00     Pack years: 3.00     Types: Cigarettes   • Smokeless tobacco: Never Used   Substance and Sexual Activity   • Alcohol use: No     Alcohol/week: 0.0 oz   • Drug use: No   • Sexual activity: Not Currently     Partners: Male   Lifestyle   • Physical activity:     Days per week: Not on file     Minutes per session: Not on file   • Stress: Not on file   Relationships   • Social connections:     Talks on phone: Not on file     Gets together: Not on file     Attends Rastafarian service: Not on file     Active member of club or organization: Not on file     Attends meetings of clubs or organizations: Not on file     Relationship status: Not on file   • Intimate partner violence:     Fear of current or ex partner: Not on file     Emotionally abused: Not on file     Physically abused: Not on file     Forced sexual activity: Not on file   Other Topics Concern   • Not on file   Social History Narrative   • Not on file         Medications:    Current Outpatient Medications:   •  acetaminophen (TYLENOL) 500 MG Tab, ACETAMINOPHEN 500 MG TABS, Disp: , Rfl:   •  amoxicillin (AMOXIL) 500 MG Cap, TAKE 2 CAPSULES BY MOUTH NOW, THEN TAKE ONE CAP 3 TIMES DAILY UNTIL GONE, Disp: , Rfl: 0  •  azithromycin (ZITHROMAX) 250 MG Tab, TAKE 2 TABLETS BY MOUTH ON DAY 1 AND THEN TAKE 1 TABLET BY MOUTH ONCE A DAY ON DAY 2 THROUGH DAY 5, Disp: , Rfl: 0  •  benzonatate (TESSALON) 100 MG Cap, TAKE 1 CAPSULE BY MOUTH EVERY 8 HOURS AS NEEDED FOR COUGH, Disp: , Rfl: 0  •  cephALEXin (KEFLEX) 500 MG Cap, TAKE 1 CAPSULE BY MOUTH 3 TIMES A DAY FOR 7 DAYS, Disp: , Rfl: 0  •  Disposable Gloves (LATEX GLOVES LARGE) Misc, LATEX GLOVES LARGE, Disp: , Rfl:   •  metroNIDAZOLE (FLAGYL) 500 MG  Tab, TAKE 1 TABLET BY MOUTH TWICE A DAY FOR 7 DAYS DO NOT DRINK ALCOHOL WHILE ON THIS MEDICATION, Disp: , Rfl: 0  •  nicotine polacrilex (NICORETTE) 4 MG gum, NICOTINE POLACRILEX 4 MG GUM, Disp: , Rfl:   •  polyethylene glycol 3350 (MIRALAX) Powder, POLYETHYLENE GLYCOL 3350 POWD, Disp: , Rfl:   •  levothyroxine (EUTHYROX) 25 MCG Tab, EUTHYROX 25 MCG TABS, Disp: , Rfl:   •  loratadine (CLARITIN) 10 MG Tab, LORATADINE 10 MG TABS, Disp: , Rfl:   •  olopatadine (PATANOL) 0.1 % ophthalmic solution, OLOPATADINE HCL 0.1 % SOLN, Disp: , Rfl:   •  omeprazole (PRILOSEC) 20 MG delayed-release capsule, OMEPRAZOLE 20 MG CPDR, Disp: , Rfl:   •  paliperidone (INVEGA) 3 MG ER tablet, PALIPERIDONE ER 3 MG XR24H-TAB, Disp: , Rfl:   •  sennosides (CVS SENNA) 8.6 MG Tab, CVS SENNA 8.6 MG TABS, Disp: , Rfl:   •  naproxen (NAPROSYN) 500 MG Tab, Take 500 mg by mouth 2 times a day, with meals., Disp: , Rfl:   •  Omega-3 Fatty Acids (FISH OIL) 500 MG Cap, FISH  MG CAPS, Disp: , Rfl:   •  ibuprofen (MOTRIN) 800 MG Tab, IBUPROFEN 800 MG TABS, Disp: , Rfl:   •  omeprazole (PRILOSEC) 20 MG delayed-release capsule, Take 20 mg by mouth every day., Disp: , Rfl:   •  Diclofenac Sodium 1 % Gel, Apply 1 Application to affected area(s) as needed., Disp: , Rfl:   •  levothyroxine (SYNTHROID) 50 MCG Tab, Take 1 Tab by mouth Every morning on an empty stomach. (Patient not taking: Reported on 11/2/2019), Disp: 90 Tab, Rfl: 0  •  loratadine (CLARITIN) 10 MG Tab, Take 10 mg by mouth every day. TAKE 1 TAB BY MOUTH EVERY DAY., Disp: , Rfl: 0  •  olopatadine (PATANOL) 0.1 % ophthalmic solution, Place 1 Drop in both eyes 2 times a day., Disp: , Rfl:   •  sennosides (SENOKOT) 8.6 MG Tab, Take 8.6 mg by mouth 1 time daily as needed., Disp: , Rfl:   •  paliperidone (INVEGA) 3 MG ER tablet, Take 3 mg by mouth., Disp: , Rfl:       Allergies:  Patient has no known allergies.      ROS    Constitutional: Negative for fever, chills and malaise/fatigue.   HENT:  "Negative for congestion and sore throat.    Eyes: Negative for blurred vision, double vision and photophobia.   Respiratory: Negative for cough and shortness of breath.  Cardiovascular: Negative for chest pain and palpitations.   Gastrointestinal: Negative for heartburn, nausea, vomiting, abdominal pain, diarrhea and constipation.   Genitourinary: Negative for dysuria and flank pain.   Musculoskeletal: POS for left heel myalgias.   Skin: Negative for itching and rash.   Neurological: Negative for dizziness, tingling and headaches.   Endo/Heme/Allergies: Does not bruise/bleed easily.   Psychiatric/Behavioral: Negative for depression. The patient is not nervous/anxious.         Objective:     /70   Pulse 84   Temp 36.4 °C (97.6 °F) (Temporal)   Resp 16   Ht 1.778 m (5' 10\")   Wt (!) 127 kg (280 lb)   SpO2 97%   BMI 40.18 kg/m²      Physical Exam   Musculoskeletal:        Left foot: There is tenderness. There is normal range of motion, no bony tenderness, no swelling, normal capillary refill, no crepitus, no deformity and no laceration.        Feet:          Constitutional: PT is oriented to person, place, and time. PT appears well-developed and well-nourished. No distress.   HENT:   Head: Normocephalic and atraumatic.   Mouth/Throat: Oropharynx is clear and moist. No oropharyngeal exudate.   Eyes: Conjunctivae normal and EOM are normal. Pupils are equal, round, and reactive to light.   Neck: Normal range of motion. Neck supple. No thyromegaly present.   Cardiovascular: Normal rate, regular rhythm, normal heart sounds and intact distal pulses.  Exam reveals no gallop and no friction rub.    No murmur heard.  Pulmonary/Chest: Effort normal and breath sounds normal. No respiratory distress. PT has no wheezes. PT has no rales. Pt exhibits no tenderness.   Abdominal: Soft. Bowel sounds are normal. PT exhibits no distension and no mass. There is no tenderness. There is no rebound and no guarding. " "  Neurological: PT is alert and oriented to person, place, and time. PT has normal reflexes. No cranial nerve deficit.   Skin: Skin is warm and dry. No rash noted. PT is not diaphoretic. No erythema.       Psychiatric: PT has a normal mood and affect. PT behavior is normal. Judgment and thought content normal.          Assessment/Plan:     1. Chronic heel pain, left    No \"smaller boot\" available as she wants her same shoe size in a stream-lined smaller boot style  Acw wrap and ankle brace given  RICE therapy discussed  Gentle ROM exercises discussed  WBAT left LE  Ice/heat therapy discussed  OTC ibuprofen for pain control  Rest, fluids encouraged.  AVS with medical info given.  Pt was in full understanding and agreement with the plan.  Follow-up as needed if symptoms worsen or fail to improve.      "

## 2019-11-02 NOTE — ED TRIAGE NOTES
"Chief Complaint   Patient presents with   • Cough   • Chest Pain     with coughing only    • Nausea       Pt ambulatory to ed via ems from her assisted living. Pt reports getting a flu shot yesterday and developing a cough today that causes cp.  Pt also reports nausea, given 4mg Zofran odt pta by ems.      Pt has hx of developmental delay, is poor historian.    Pt placed back in lobby.  Informed of triage process and wait times, thanked for patience.  Pt instructed to notify staff of any symptom changes.     /98   Pulse 71   Temp 36.6 °C (97.9 °F) (Temporal)   Resp 18   Ht 1.778 m (5' 10\")   Wt (!) 126.6 kg (279 lb 1.6 oz)   SpO2 98%   BMI 40.05 kg/m²   "

## 2019-11-02 NOTE — ED NOTES
"Pt discharged home via ambulatory to lobby with steady gait, AOx4, assisted living employee accompanying. Pt provided cab voucher to get back to group home. Pt in possession of belongings. Pt provided discharge education and information pertaining to medications and follow up appointments. Pt received copy of discharge instructions and verbalized understanding.     /84   Pulse 68   Temp 36.8 °C (98.2 °F) (Temporal)   Resp 18   Ht 1.778 m (5' 10\")   Wt (!) 126.6 kg (279 lb 1.6 oz)   SpO2 97%   BMI 40.05 kg/m²   "

## 2019-11-02 NOTE — ED NOTES
Pt ambulatory with steady gait to YL 66, pt in gown and on monitor, call light in reach, chart up for ERP.

## 2019-11-02 NOTE — DISCHARGE PLANNING
Medical Social Work    Referral: Transportation    Intervention: JUANIS was notified that pt requiring assistance returning to [facility] on [address]. SW called [phone] and spoke w/ Deanna who confirmed that pt resides at their home and pt has a staff member at bedside who will return to the group home w/ pt. JUANIS provided taxi voucher #[id number] to bedside RN as pt is not eligible for [facility] benefits.     Plan: Pt to discharge back to [facility] via Taxi accompanied by [facility] staff member.

## 2019-11-03 ENCOUNTER — APPOINTMENT (OUTPATIENT)
Dept: RADIOLOGY | Facility: MEDICAL CENTER | Age: 44
End: 2019-11-03
Attending: EMERGENCY MEDICINE
Payer: MEDICARE

## 2019-11-03 ENCOUNTER — HOSPITAL ENCOUNTER (EMERGENCY)
Facility: MEDICAL CENTER | Age: 44
End: 2019-11-03
Attending: EMERGENCY MEDICINE
Payer: MEDICARE

## 2019-11-03 VITALS
HEART RATE: 85 BPM | RESPIRATION RATE: 16 BRPM | HEIGHT: 70 IN | SYSTOLIC BLOOD PRESSURE: 112 MMHG | BODY MASS INDEX: 40.37 KG/M2 | TEMPERATURE: 97.5 F | OXYGEN SATURATION: 93 % | DIASTOLIC BLOOD PRESSURE: 67 MMHG | WEIGHT: 281.97 LBS
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
## 2019-11-03 LAB
APPEARANCE UR: ABNORMAL
BACTERIA #/AREA URNS HPF: ABNORMAL /HPF
BILIRUB UR QL STRIP.AUTO: NEGATIVE
COLOR UR: YELLOW
EPI CELLS #/AREA URNS HPF: ABNORMAL /HPF
ERYTHROCYTE [DISTWIDTH] IN BLOOD BY AUTOMATED COUNT: 46 FL (ref 35.9–50)
GLUCOSE UR STRIP.AUTO-MCNC: NEGATIVE MG/DL
HCG UR QL: NEGATIVE
HCT VFR BLD AUTO: 42.3 % (ref 37–47)
HGB BLD-MCNC: 13.7 G/DL (ref 12–16)
HYALINE CASTS #/AREA URNS LPF: ABNORMAL /LPF
KETONES UR STRIP.AUTO-MCNC: NEGATIVE MG/DL
LEUKOCYTE ESTERASE UR QL STRIP.AUTO: ABNORMAL
MCH RBC QN AUTO: 30.9 PG (ref 27–33)
MCHC RBC AUTO-ENTMCNC: 32.4 G/DL (ref 33.6–35)
MCV RBC AUTO: 95.5 FL (ref 81.4–97.8)
MICRO URNS: ABNORMAL
NITRITE UR QL STRIP.AUTO: NEGATIVE
PH UR STRIP.AUTO: 7.5 [PH] (ref 5–8)
PLATELET # BLD AUTO: 262 K/UL (ref 164–446)
PMV BLD AUTO: 10.3 FL (ref 9–12.9)
PROT UR QL STRIP: NEGATIVE MG/DL
RBC # BLD AUTO: 4.43 M/UL (ref 4.2–5.4)
RBC # URNS HPF: ABNORMAL /HPF
RBC UR QL AUTO: NEGATIVE
SP GR UR STRIP.AUTO: 1.02
UROBILINOGEN UR STRIP.AUTO-MCNC: 2 MG/DL
WBC # BLD AUTO: 9.8 K/UL (ref 4.8–10.8)
WBC #/AREA URNS HPF: ABNORMAL /HPF

## 2019-11-03 PROCEDURE — 72125 CT NECK SPINE W/O DYE: CPT

## 2019-11-03 PROCEDURE — 700102 HCHG RX REV CODE 250 W/ 637 OVERRIDE(OP): Performed by: EMERGENCY MEDICINE

## 2019-11-03 PROCEDURE — 99284 EMERGENCY DEPT VISIT MOD MDM: CPT

## 2019-11-03 PROCEDURE — A9270 NON-COVERED ITEM OR SERVICE: HCPCS | Performed by: EMERGENCY MEDICINE

## 2019-11-03 PROCEDURE — 81001 URINALYSIS AUTO W/SCOPE: CPT

## 2019-11-03 PROCEDURE — 81025 URINE PREGNANCY TEST: CPT

## 2019-11-03 PROCEDURE — 70450 CT HEAD/BRAIN W/O DYE: CPT

## 2019-11-03 PROCEDURE — 85027 COMPLETE CBC AUTOMATED: CPT

## 2019-11-03 RX ORDER — MECLIZINE HYDROCHLORIDE 25 MG/1
25 TABLET ORAL ONCE
Status: COMPLETED | OUTPATIENT
Start: 2019-11-03 | End: 2019-11-03

## 2019-11-03 RX ORDER — NITROFURANTOIN 25; 75 MG/1; MG/1
100 CAPSULE ORAL 2 TIMES DAILY
Qty: 6 CAP | Refills: 0 | Status: SHIPPED | OUTPATIENT
Start: 2019-11-03 | End: 2019-11-06

## 2019-11-03 RX ADMIN — MECLIZINE HYDROCHLORIDE 25 MG: 25 TABLET ORAL at 18:51

## 2019-11-03 ASSESSMENT — PAIN SCALES - WONG BAKER: WONGBAKER_NUMERICALRESPONSE: HURTS AS MUCH AS POSSIBLE

## 2019-11-04 NOTE — ED TRIAGE NOTES
"Chief Complaint   Patient presents with   • T-5000 FALL     pt bib remsa c/o glf after she tripped and fell in the bathroom hitting back of head. denies loc. now c/o dizziness like room spinning. denies taking blood thinner   • Flank Pain     intermittent for \"a while\" in bilateral flank   • Painful Urination     Was given zofran odt by ems. Aaox4. Neuro intact. Educated on triage process. Instructed to notify staff for any changes.  "

## 2019-11-04 NOTE — ED PROVIDER NOTES
"ED Provider Note    CHIEF COMPLAINT  Chief Complaint   Patient presents with   • T-5000 FALL     pt bib remsa c/o glf after she tripped and fell in the bathroom hitting back of head. denies loc. now c/o dizziness like room spinning. denies taking blood thinner   • Flank Pain     intermittent for \"a while\" in bilateral flank   • Painful Urination       HPI  Frances Ardon is a 43 y.o. female who presents with dizziness.  The patient states she slipped in the bathroom at the back of her head.  Since that time she is had vertigo.  She does have a headache.  She does not take any blood thinners.  She does have some associated nausea.  EMS administered Zofran and she has not had any vomiting since that time.  She says she is also had some intermittent dysuria.  The patient states she is also some intermittent bilateral flank pain that was prior to the fall.  She did not have any chest pain, palpitations, nor difficulty with breathing.    REVIEW OF SYSTEMS  See HPI for further details. All other systems are negative.     PAST MEDICAL HISTORY  Past Medical History:   Diagnosis Date   • Tobacco dependence 12/27/2016   • Hypothyroidism 12/27/2016   • GERD (gastroesophageal reflux disease) 12/27/2016   • Mental retardation, mild (I.Q. 50-70) 12/27/2016   • Dyslipidemia 12/27/2016   • History of seizures 12/27/2016   • Arthritis    • Asthma    • Hypertension        FAMILY HISTORY  [unfilled]    SOCIAL HISTORY  Social History     Socioeconomic History   • Marital status: Single     Spouse name: Not on file   • Number of children: Not on file   • Years of education: Not on file   • Highest education level: Not on file   Occupational History   • Not on file   Social Needs   • Financial resource strain: Not on file   • Food insecurity:     Worry: Not on file     Inability: Not on file   • Transportation needs:     Medical: Not on file     Non-medical: Not on file   Tobacco Use   • Smoking status: Current Every Day Smoker     " Packs/day: 0.25     Years: 12.00     Pack years: 3.00     Types: Cigarettes   • Smokeless tobacco: Never Used   Substance and Sexual Activity   • Alcohol use: No     Alcohol/week: 0.0 oz   • Drug use: No   • Sexual activity: Not Currently     Partners: Male   Lifestyle   • Physical activity:     Days per week: Not on file     Minutes per session: Not on file   • Stress: Not on file   Relationships   • Social connections:     Talks on phone: Not on file     Gets together: Not on file     Attends Latter-day service: Not on file     Active member of club or organization: Not on file     Attends meetings of clubs or organizations: Not on file     Relationship status: Not on file   • Intimate partner violence:     Fear of current or ex partner: Not on file     Emotionally abused: Not on file     Physically abused: Not on file     Forced sexual activity: Not on file   Other Topics Concern   • Not on file   Social History Narrative   • Not on file       SURGICAL HISTORY  No past surgical history on file.    CURRENT MEDICATIONS  Home Medications     Reviewed by Yola Scruggs R.N. (Registered Nurse) on 11/03/19 at 1702  Med List Status: <None>   Medication Last Dose Status   acetaminophen (TYLENOL) 500 MG Tab  Active   amoxicillin (AMOXIL) 500 MG Cap  Active   azithromycin (ZITHROMAX) 250 MG Tab  Active   benzonatate (TESSALON) 100 MG Cap  Active   cephALEXin (KEFLEX) 500 MG Cap  Active   Diclofenac Sodium 1 % Gel  Active   Disposable Gloves (LATEX GLOVES LARGE) Misc  Active   ibuprofen (MOTRIN) 800 MG Tab  Active   levothyroxine (EUTHYROX) 25 MCG Tab  Active   levothyroxine (SYNTHROID) 50 MCG Tab  Active   loratadine (CLARITIN) 10 MG Tab  Active   loratadine (CLARITIN) 10 MG Tab  Active   metroNIDAZOLE (FLAGYL) 500 MG Tab  Active   naproxen (NAPROSYN) 500 MG Tab  Active   nicotine polacrilex (NICORETTE) 4 MG gum  Active   olopatadine (PATANOL) 0.1 % ophthalmic solution  Active   olopatadine (PATANOL) 0.1 % ophthalmic  "solution  Active   Omega-3 Fatty Acids (FISH OIL) 500 MG Cap  Active   omeprazole (PRILOSEC) 20 MG delayed-release capsule  Active   omeprazole (PRILOSEC) 20 MG delayed-release capsule  Active   paliperidone (INVEGA) 3 MG ER tablet  Active   paliperidone (INVEGA) 3 MG ER tablet  Active   polyethylene glycol 3350 (MIRALAX) Powder  Active   sennosides (CVS SENNA) 8.6 MG Tab  Active   sennosides (SENOKOT) 8.6 MG Tab  Active                ALLERGIES  No Known Allergies    PHYSICAL EXAM  VITAL SIGNS: /114   Pulse 81   Temp 36.4 °C (97.5 °F) (Temporal)   Resp 16   Ht 1.778 m (5' 10\")   Wt (!) 127.9 kg (281 lb 15.5 oz)   SpO2 96%   BMI 40.46 kg/m²  Room air O2: 96    Constitutional: Well developed, Well nourished, No acute distress, Non-toxic appearance.   HENT: Occipital tenderness, Bilateral external ears normal, Oropharynx moist, No oral exudates, Nose normal.   Eyes: PERRLA, EOMI, Conjunctiva normal, No discharge.   Neck: Midline cervical discomfort without step-offs the patient also has paraspinal muscle discomfort in the cervical region.   Lymphatic: No lymphadenopathy noted.   Cardiovascular: Normal heart rate, Normal rhythm, No murmurs, No rubs, No gallops.   Thorax & Lungs: Normal breath sounds, No respiratory distress, No wheezing, No chest tenderness.   Abdomen: Bowel sounds normal, Soft, No tenderness, No masses, No pulsatile masses.   Skin: Warm, Dry, No erythema, No rash.   Back: No tenderness, No CVA tenderness. .   Extremities: Intact distal pulses, No edema, No tenderness, No cyanosis, No clubbing.   Musculoskeletal: Good range of motion in all major joints. No tenderness to palpation or major deformities noted.   Neurologic: Alert & oriented x 3, Normal motor function, Normal sensory function, No focal deficits noted.   Psychiatric: Affect normal, Judgment normal, Mood normal.       RADIOLOGY/PROCEDURES  CT-HEAD W/O   Final Result      No acute intracranial abnormality is identified.    "   CT-CSPINE WITHOUT PLUS RECONS   Final Result      No CT evidence of acute cervical spine abnormality.            COURSE & MEDICAL DECISION MAKING  Pertinent Labs & Imaging studies reviewed. (See chart for details)  This a 43-year-old female who presents the emerge department with vertigo after a fall.  CT scan of the head does not show any evidence of intracranial trauma.  The patient received meclizine.  I did have a discussion with her caretaker and the concern that the patient has had frequent ER visits and that she may be developing Munchhausen syndrome.  The patient does not have any obvious signs of trauma at this time.  She also complains of some urinary frequency.  Her urine does show some leukocyte Estrace and a couple white cells therefore placed the patient on 3 days of Macrobid.  I told the caretaker they need to obtain a psychiatric evaluation from the primary care provider as the patient does not meet criteria for legal hold at this time.  Therefore presenting symptoms could be from a psychologic component.    FINAL IMPRESSION  1.  Fall   2.  Question of concussion  3.  Cervical strain  4.  Vertigo  5.  Urinary tract infection    Disposition  The patient will be discharged in stable condition         Electronically signed by: Amrit Delcid, 11/3/2019 6:16 PM

## 2019-12-04 NOTE — NUR
pt resting in bed, c/o intermitten, positional chest pain for a couple days, 
denies any other symptoms.

## 2019-12-27 NOTE — NUR
PT MEDICATED PER EMAR. STATES SHE FEELS 'OUT OF IT' AFTER MEDICATIONS BUT 
STATES THAT HER HEADACHE FEELS BETTER

## 2020-01-04 NOTE — NUR
NIYA RN: PT REPORTS SHE WAS JUST DISCHARGED. WHILE SHE WAS WAITING IN THE 
LOBBY SHE STARTED HAVING RIGHT SIDED ABD PAIN AFTER HAVING A BOWEL MOVEMENT. VS 
STABLE. CALL LIGHT IN PLACE. WILL CONTINUE MONITOR.

## 2020-01-11 NOTE — NUR
PT BIBA FOR C/O STERNAL CP ONSET THIS AM AFTER DISPUTE AT GROUP HOME. REPORT 
RECEIVED [facility]. PER EMS, PT IS KNOWN TO C/O CP AFTER DISPUTES AND CALL EMS 
WEEKLY. PT ALSO NOTES PAIN TO LEFT FOREARM (ONSET YESTERDAY, DENIES 
INJURY/TRAUMA). EMESIS X 1 PER PT THIS AM. EKG TAKEN BY EDT, REVIEWED BY EDMD. 
PT ATTACHED TO ALL MONITORS. PT A&O, RESPS EVEN AND UNLABORED, NSR ON CARDIAC 
MONITOR, NO ECTOPY NOTED. CALL LIGHT IN REACH. PT IN GOWN. WARM BLANKET 
PROVIDED. AWAITING LABS AND CXR AT THIS TIME.

## 2020-01-11 NOTE — NUR
PT REPORTS SHE TOOK 800MG IBUPROFEN THIS AM FOR ARM PAIN BUT MAY HAVE VOMITED 
DOSE. SOY LE NOTIFIED. MD OK'D RN TO ADMINISTER TORADOL. PT MEDICATED PER 
EMAR, TOLERATED WELL. PT A&O, NSR ON CARDIAC MONITOR WITH NO ECTOPY. PT NOTES 
PAIN LEVEL TO LEFT ARM/CHEST IS 8/10 AT THIS TIME. ALL RESULTS BACK, CHART UP 
FOR RECHECK. AWAITING MD AND ORDERS.

## 2020-01-11 NOTE — NUR
RECEIVED BEDSIDE REPORT FROM WILTON RN, ASSUMING CARE OF PT AT THIS TIME. PT 
RESTING IN BED, JING, DAVID. ALL RESULTS BACK AT THIS TIME, CHART UP FOR RECHECK. 
CALL LIGHT WITHIN REACH.

## 2020-01-19 NOTE — NUR
Pt to ed from home by ambulance. c/o ha started last night. was in [place] so 
did not go to ed. sts vomited yest. c/o nausea. tylenol per ems. pt appears to 
have developmental delay and slight speech impediment. does not know what meds 
she takes. bp wnl. c/o photophobia. orientedlucio nix, 5/5strength, no drift. 
call bell in reach awaiting md. as

## 2020-03-25 ENCOUNTER — HOSPITAL ENCOUNTER (EMERGENCY)
Facility: MEDICAL CENTER | Age: 45
End: 2020-03-25
Attending: EMERGENCY MEDICINE
Payer: MEDICARE

## 2020-03-25 VITALS
OXYGEN SATURATION: 96 % | RESPIRATION RATE: 16 BRPM | HEART RATE: 79 BPM | SYSTOLIC BLOOD PRESSURE: 132 MMHG | DIASTOLIC BLOOD PRESSURE: 90 MMHG | HEIGHT: 70 IN | BODY MASS INDEX: 40.08 KG/M2 | TEMPERATURE: 97 F | WEIGHT: 279.98 LBS

## 2020-03-25 DIAGNOSIS — M79.10 MYALGIA: ICD-10-CM

## 2020-03-25 LAB
T4 FREE SERPL-MCNC: 0.99 NG/DL (ref 0.53–1.43)
TSH SERPL DL<=0.005 MIU/L-ACNC: 7.26 UIU/ML (ref 0.38–5.33)

## 2020-03-25 PROCEDURE — 84439 ASSAY OF FREE THYROXINE: CPT

## 2020-03-25 PROCEDURE — 84443 ASSAY THYROID STIM HORMONE: CPT

## 2020-03-25 PROCEDURE — 99284 EMERGENCY DEPT VISIT MOD MDM: CPT

## 2020-03-25 PROCEDURE — 99283 EMERGENCY DEPT VISIT LOW MDM: CPT

## 2020-03-25 ASSESSMENT — FIBROSIS 4 INDEX: FIB4 SCORE: 0.68

## 2020-03-25 ASSESSMENT — LIFESTYLE VARIABLES: DO YOU DRINK ALCOHOL: NO

## 2020-03-26 NOTE — DISCHARGE INSTRUCTIONS
He can use an Ace wrap on your foot if it helps in the discomfort.  You need to have your thyroid levels checked every few months.

## 2020-03-26 NOTE — ED TRIAGE NOTES
"Pt bib remsa to triage for complaint of \"generalized body pains x 4 hours.\" Pt denies SOB, sore throat, or cough. pt afebrile. REMSA at group home for call to patient's roommate when patient reportedly stated \"I need to see a doctor too.\"     Pt speaking in full sentences, NAD noted. Pt educated of triage process, placed back in waiting area pending room assignment.    "

## 2020-03-26 NOTE — ED PROVIDER NOTES
ED Provider Note    Scribed for Manish Saavedra M.D. by Bharti Thurman. 3/25/2020  5:59 PM    Primary care provider: Cong Perez M.D.  Means of arrival: EMS  History obtained from: Patient  History limited by: None    CHIEF COMPLAINT  Chief Complaint   Patient presents with   • Generalized Body Aches       [facility]  Frances Ardon is a 44 y.o. female who presents to the Emergency Department for generalized body aches onset 4 hours ago. The patient reports associated generalized weakness and left foot pain, however she states that her foot pain is chronic. She denies cough or sore throat. Patient also denies being around anyone that is sick recently.     REVIEW OF SYSTEMS  Pertinent positives include body aches, weakness, and left foot pain. Pertinent negatives include no cough or sore throat.   See HPI for further details.     PAST MEDICAL HISTORY   has a past medical history of Arthritis, Asthma, Dyslipidemia (12/27/2016), GERD (gastroesophageal reflux disease) (12/27/2016), History of seizures (12/27/2016), Hypertension, Hypothyroidism (12/27/2016), Mental retardation, mild (I.Q. 50-70) (12/27/2016), and Tobacco dependence (12/27/2016).    SURGICAL HISTORY  patient denies any surgical history    SOCIAL HISTORY  Social History     Tobacco Use   • Smoking status: Current Every Day Smoker     Packs/day: 0.25     Years: 12.00     Pack years: 3.00     Types: Cigarettes   • Smokeless tobacco: Never Used   Substance Use Topics   • Alcohol use: No     Alcohol/week: 0.0 oz   • Drug use: No      Social History     Substance and Sexual Activity   Drug Use No       FAMILY HISTORY  Family History   Problem Relation Age of Onset   • Cancer Neg Hx        CURRENT MEDICATIONS  Home Medications     Reviewed by Maria Li R.N. (Registered Nurse) on 03/25/20 at 1809  Med List Status: Complete   Medication Last Dose Status   acetaminophen (TYLENOL) 500 MG Tab prn Active   Diclofenac Sodium 1 % Gel prn Active  "  Disposable Gloves (LATEX GLOVES LARGE) Misc prn Active   ibuprofen (MOTRIN) 800 MG Tab prn Active   levothyroxine (SYNTHROID) 50 MCG Tab not taking Active   loratadine (CLARITIN) 10 MG Tab  Active   naproxen (NAPROSYN) 500 MG Tab  Active   nicotine polacrilex (NICORETTE) 4 MG gum  Active   olopatadine (PATANOL) 0.1 % ophthalmic solution  Active   olopatadine (PATANOL) 0.1 % ophthalmic solution  Active   Omega-3 Fatty Acids (FISH OIL) 500 MG Cap  Active   omeprazole (PRILOSEC) 20 MG delayed-release capsule 3/25/2020 Active   paliperidone (INVEGA) 3 MG ER tablet 3/24/2020 Active   polyethylene glycol 3350 (MIRALAX) Powder prn Active                ALLERGIES  No Known Allergies    PHYSICAL EXAM  VITAL SIGNS: /104   Pulse 80   Temp 36.1 °C (97 °F) (Temporal)   Resp 14   Ht 1.778 m (5' 10\")   Wt (!) 127 kg (279 lb 15.8 oz)   SpO2 98%   BMI 40.17 kg/m²     Constitutional: Speech difficult to understand. Well developed, Well nourished,no acute distress, Non-toxic appearance.   HENT: Normocephalic, Atraumatic.  Oropharynx moist.   Eyes: PERRL, EOMI, Conjunctiva normal, No discharge.   Neck: no anterior cervical lymphadenopathy  CV: Good pulses  Thorax & Lungs: No respiratory distress.   Abdomen:  Soft, non-tender.  No rebound, no peritoneal signs.   Skin: Warm, Dry, No erythema, No rash.    Musculoskeletal: Generalized muscle tenderness. No major deformities noted.  There is some tenderness and slight swelling of the foot- -old scar where she had surgery on the foot which is a little tender does not appear acute  Neurologic: Awake, alert. Moves all extremities spontaneously.  Psychiatric: Affect normal, Mood normal.     LABS  Results for orders placed or performed during the hospital encounter of 03/25/20   TSH   Result Value Ref Range    TSH 7.260 (H) 0.380 - 5.330 uIU/mL   FREE THYROXINE   Result Value Ref Range    Free T-4 0.99 0.53 - 1.43 ng/dL     All labs reviewed by me.    COURSE & MEDICAL DECISION " MAKING  Nursing notes, VS, PMSFHx reviewed in chart.    5:59 PM - Patient seen and examined at bedside. Ordered free thyroxine and TSH to evaluate her symptoms.     7:32 PM - Patient was reevaluated at bedside. Discussed lab results with the patient and informed them that they were negative for any acute findings. Encouraged regular monitoring of thyroid. Patient informed that she will be discharged home and was given the opportunity to ask any questions. Discussed return precautions and encouraged her to return for any new or worsening symptoms. Patient verbalizes understanding and agreement to this plan of care.     The patient will return for new or worsening symptoms and is stable at the time of discharge.    The patient is referred to a primary physician for blood pressure management, diabetic screening, and for all other preventative health concerns.  Patient had some generalized myalgias at that perhaps she might be hypothyroid since she apparently recently stopped thyroid replacement.  Labs did show elevated TSH she needs to continue to have monitoring of her thyroid function every few months she does not appear to have any acute condition today    DISPOSITION:  Patient will be discharged home in stable condition.    FOLLOW UP:  Cong Perez M.D.  [address] [place] [id number]  [phone]            OUTPATIENT MEDICATIONS:  New Prescriptions    No medications on file         FINAL IMPRESSION  1. Myalgia          IBharti (Anuj), am scribing for, and in the presence of, Manish Saavedra M.D..    Electronically signed by: Bharti Thurman (Anuj), 3/25/2020    Manish SOSA M.D. personally performed the services described in this documentation, as scribed by Bharti Thurman in my presence, and it is both accurate and complete. E    The note accurately reflects work and decisions made by me.  Manish Saavedra M.D.  3/25/2020  7:51 PM

## 2020-03-26 NOTE — ED NOTES
Discharge instructions given to pt. Prescriptions unchanged. Pt educated, verbalizes understanding. All belongings accounted for. Pt wheeled out of ED with ED staff to go home. Cab called by RN to go home, pt awaiting ride home.

## 2020-03-27 NOTE — NUR
DISCHARGE INSTRUCTIONS GIVEN TO PATIENT. 



PT VERBALIZES UNDERSTANDING OF ALL INSTRUCTIONS AND FOLLOW UP.



PT AMBULATED OUT OF ED WITHOUT DIFF.
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
## 2020-05-29 NOTE — NUR
PT PLACED ON BP CUFF, PULSE OX.  PT APPEARS COMFORTABLE BUT RATES PAIN AT 
10/10, WORSE WITH PALPATION.  ERP IN TO SEE ON ARRIVAL.  CALL LIGHT WITHIN 
REACH.

## 2020-07-11 ENCOUNTER — HOSPITAL ENCOUNTER (EMERGENCY)
Facility: MEDICAL CENTER | Age: 45
End: 2020-07-11
Attending: EMERGENCY MEDICINE
Payer: MEDICARE

## 2020-07-11 ENCOUNTER — APPOINTMENT (OUTPATIENT)
Dept: RADIOLOGY | Facility: MEDICAL CENTER | Age: 45
End: 2020-07-11
Attending: EMERGENCY MEDICINE
Payer: MEDICARE

## 2020-07-11 VITALS
SYSTOLIC BLOOD PRESSURE: 110 MMHG | TEMPERATURE: 98 F | RESPIRATION RATE: 16 BRPM | WEIGHT: 280.43 LBS | HEIGHT: 69 IN | DIASTOLIC BLOOD PRESSURE: 79 MMHG | OXYGEN SATURATION: 95 % | HEART RATE: 71 BPM | BODY MASS INDEX: 41.53 KG/M2

## 2020-07-11 DIAGNOSIS — M79.672 LEFT FOOT PAIN: ICD-10-CM

## 2020-07-11 DIAGNOSIS — R07.89 CHEST WALL PAIN: ICD-10-CM

## 2020-07-11 DIAGNOSIS — N30.00 ACUTE CYSTITIS WITHOUT HEMATURIA: ICD-10-CM

## 2020-07-11 LAB
ALBUMIN SERPL BCP-MCNC: 4.5 G/DL (ref 3.2–4.9)
ALBUMIN/GLOB SERPL: 1.5 G/DL
ALP SERPL-CCNC: 86 U/L (ref 30–99)
ALT SERPL-CCNC: 22 U/L (ref 2–50)
ANION GAP SERPL CALC-SCNC: 11 MMOL/L (ref 7–16)
APPEARANCE UR: ABNORMAL
AST SERPL-CCNC: 16 U/L (ref 12–45)
BACTERIA #/AREA URNS HPF: ABNORMAL /HPF
BASOPHILS # BLD AUTO: 1.1 % (ref 0–1.8)
BASOPHILS # BLD: 0.1 K/UL (ref 0–0.12)
BILIRUB SERPL-MCNC: 0.4 MG/DL (ref 0.1–1.5)
BILIRUB UR QL STRIP.AUTO: NEGATIVE
BUN SERPL-MCNC: 12 MG/DL (ref 8–22)
CALCIUM SERPL-MCNC: 9.6 MG/DL (ref 8.5–10.5)
CAOX CRY #/AREA URNS HPF: ABNORMAL /HPF
CHLORIDE SERPL-SCNC: 103 MMOL/L (ref 96–112)
CO2 SERPL-SCNC: 23 MMOL/L (ref 20–33)
COLOR UR: YELLOW
CREAT SERPL-MCNC: 0.71 MG/DL (ref 0.5–1.4)
EKG IMPRESSION: NORMAL
EOSINOPHIL # BLD AUTO: 0.17 K/UL (ref 0–0.51)
EOSINOPHIL NFR BLD: 1.8 % (ref 0–6.9)
EPI CELLS #/AREA URNS HPF: ABNORMAL /HPF
ERYTHROCYTE [DISTWIDTH] IN BLOOD BY AUTOMATED COUNT: 45.4 FL (ref 35.9–50)
GLOBULIN SER CALC-MCNC: 3 G/DL (ref 1.9–3.5)
GLUCOSE SERPL-MCNC: 94 MG/DL (ref 65–99)
GLUCOSE UR STRIP.AUTO-MCNC: NEGATIVE MG/DL
HCG SERPL QL: NEGATIVE
HCT VFR BLD AUTO: 41.3 % (ref 37–47)
HGB BLD-MCNC: 13.6 G/DL (ref 12–16)
HYALINE CASTS #/AREA URNS LPF: ABNORMAL /LPF
IMM GRANULOCYTES # BLD AUTO: 0.03 K/UL (ref 0–0.11)
IMM GRANULOCYTES NFR BLD AUTO: 0.3 % (ref 0–0.9)
KETONES UR STRIP.AUTO-MCNC: NEGATIVE MG/DL
LEUKOCYTE ESTERASE UR QL STRIP.AUTO: ABNORMAL
LIPASE SERPL-CCNC: 18 U/L (ref 11–82)
LYMPHOCYTES # BLD AUTO: 2.65 K/UL (ref 1–4.8)
LYMPHOCYTES NFR BLD: 28.8 % (ref 22–41)
MCH RBC QN AUTO: 30.6 PG (ref 27–33)
MCHC RBC AUTO-ENTMCNC: 32.9 G/DL (ref 33.6–35)
MCV RBC AUTO: 92.8 FL (ref 81.4–97.8)
MICRO URNS: ABNORMAL
MONOCYTES # BLD AUTO: 0.62 K/UL (ref 0–0.85)
MONOCYTES NFR BLD AUTO: 6.7 % (ref 0–13.4)
NEUTROPHILS # BLD AUTO: 5.63 K/UL (ref 2–7.15)
NEUTROPHILS NFR BLD: 61.3 % (ref 44–72)
NITRITE UR QL STRIP.AUTO: NEGATIVE
NRBC # BLD AUTO: 0 K/UL
NRBC BLD-RTO: 0 /100 WBC
PH UR STRIP.AUTO: 5 [PH] (ref 5–8)
PLATELET # BLD AUTO: 255 K/UL (ref 164–446)
PMV BLD AUTO: 10.2 FL (ref 9–12.9)
POTASSIUM SERPL-SCNC: 4 MMOL/L (ref 3.6–5.5)
PROT SERPL-MCNC: 7.5 G/DL (ref 6–8.2)
PROT UR QL STRIP: NEGATIVE MG/DL
RBC # BLD AUTO: 4.45 M/UL (ref 4.2–5.4)
RBC # URNS HPF: ABNORMAL /HPF
RBC UR QL AUTO: ABNORMAL
SODIUM SERPL-SCNC: 137 MMOL/L (ref 135–145)
SP GR UR STRIP.AUTO: 1.02
TROPONIN T SERPL-MCNC: 10 NG/L (ref 6–19)
UROBILINOGEN UR STRIP.AUTO-MCNC: 0.2 MG/DL
WBC # BLD AUTO: 9.2 K/UL (ref 4.8–10.8)
WBC #/AREA URNS HPF: ABNORMAL /HPF

## 2020-07-11 PROCEDURE — 85025 COMPLETE CBC W/AUTO DIFF WBC: CPT

## 2020-07-11 PROCEDURE — 81001 URINALYSIS AUTO W/SCOPE: CPT

## 2020-07-11 PROCEDURE — 80053 COMPREHEN METABOLIC PANEL: CPT

## 2020-07-11 PROCEDURE — 700111 HCHG RX REV CODE 636 W/ 250 OVERRIDE (IP): Performed by: EMERGENCY MEDICINE

## 2020-07-11 PROCEDURE — 700102 HCHG RX REV CODE 250 W/ 637 OVERRIDE(OP): Performed by: EMERGENCY MEDICINE

## 2020-07-11 PROCEDURE — 84703 CHORIONIC GONADOTROPIN ASSAY: CPT

## 2020-07-11 PROCEDURE — A9270 NON-COVERED ITEM OR SERVICE: HCPCS | Performed by: EMERGENCY MEDICINE

## 2020-07-11 PROCEDURE — 93005 ELECTROCARDIOGRAM TRACING: CPT | Performed by: EMERGENCY MEDICINE

## 2020-07-11 PROCEDURE — 84484 ASSAY OF TROPONIN QUANT: CPT

## 2020-07-11 PROCEDURE — 96375 TX/PRO/DX INJ NEW DRUG ADDON: CPT

## 2020-07-11 PROCEDURE — 93005 ELECTROCARDIOGRAM TRACING: CPT

## 2020-07-11 PROCEDURE — 73650 X-RAY EXAM OF HEEL: CPT | Mod: LT

## 2020-07-11 PROCEDURE — 83690 ASSAY OF LIPASE: CPT

## 2020-07-11 PROCEDURE — 99285 EMERGENCY DEPT VISIT HI MDM: CPT

## 2020-07-11 PROCEDURE — 96374 THER/PROPH/DIAG INJ IV PUSH: CPT

## 2020-07-11 PROCEDURE — 71045 X-RAY EXAM CHEST 1 VIEW: CPT

## 2020-07-11 RX ORDER — ONDANSETRON 2 MG/ML
4 INJECTION INTRAMUSCULAR; INTRAVENOUS ONCE
Status: COMPLETED | OUTPATIENT
Start: 2020-07-11 | End: 2020-07-11

## 2020-07-11 RX ORDER — NITROFURANTOIN 25; 75 MG/1; MG/1
100 CAPSULE ORAL 2 TIMES DAILY
Qty: 6 CAP | Refills: 0 | Status: SHIPPED | OUTPATIENT
Start: 2020-07-11 | End: 2020-07-14

## 2020-07-11 RX ADMIN — FAMOTIDINE 20 MG: 10 INJECTION, SOLUTION INTRAVENOUS at 20:42

## 2020-07-11 RX ADMIN — ONDANSETRON 4 MG: 2 INJECTION INTRAMUSCULAR; INTRAVENOUS at 20:41

## 2020-07-11 RX ADMIN — LIDOCAINE HYDROCHLORIDE 30 ML: 20 SOLUTION OROPHARYNGEAL at 20:41

## 2020-07-11 ASSESSMENT — FIBROSIS 4 INDEX: FIB4 SCORE: 0.68

## 2020-07-12 NOTE — ED NOTES
D/C home with written and verbal instructions re: Rx, activity, f/u.  Verbalizes understanding.  Ambulated out with steady gait. Social work called to get a cab voucher home. Patient A+Ox4.

## 2020-07-12 NOTE — ED TRIAGE NOTES
"Chief Complaint   Patient presents with   • Cough     5 hours ago, dry cough. Pt has hx of thyroid disease, not complaint with medicaiton.   • Chest Pain     x1 hour when she was doing yard work, denies n/v, non radiating. Pinpoint to R pectoral region, worse with palpation.   • Foot Pain     had a surgery \"many years ago\", and feels residual pain on her L heel. CMS in tact.       /87   Pulse 69   Temp 36.5 °C (97.7 °F) (Temporal)   Resp 16   Ht 1.753 m (5' 9\")   Wt (!) 127.2 kg (280 lb 6.8 oz)   SpO2 96%   BMI 41.41 kg/m²     Pt resting comfortably during triage, in no acute distress. No cough noted during the assessment. Pt is aaox4, ambulatory with steady gait, resting on RA.     EKG ordered.    Pt Informed regarding triage process and verbalized understanding to inform triage tech or RN for any changes in condition.  Placed in lobby.    "

## 2020-07-12 NOTE — ED PROVIDER NOTES
"                                                                          ED Provider Note    Scribed for Hai Aguillon M.D. by Manish Villegas. 7/11/2020  8:00 PM    CHIEF COMPLAINT  Chief Complaint   Patient presents with   • Cough     5 hours ago, dry cough. Pt has hx of thyroid disease, not complaint with medicaiton.   • Chest Pain     x1 hour when she was doing yard work, denies n/v, non radiating. Pinpoint to R pectoral region, worse with palpation.   • Foot Pain     had a surgery \"many years ago\", and feels residual pain on her L heel. CMS in tact.     HPI  Frances Ardon is a 44 y.o. female who presents via EMS with complaints of right-sided chest pain acute onset 1 hour ago while she was sitting on the couch. She can localize it a to a specific spot on her chest, and she describes it as mild and sharp. She has not had this pain in the psat. She has not had any recent falls or trauma to the area. She reports she was doing yard work earlier in the day. She further reports some mild pain to her epigastric region, right flank, and suprapubic region. She denies any shortness of breath, vomiting, diaphoresis, or dysuria. She further reports complaints of pain to her left heel.     PPE Note: I personally donned full PPE for all patient encounters during this visit, including being clean-shaven with an N95 respirator mask, gloves, and eye protection. Scribe remained outside the patient's room and did not have any contact with the patient for the duration of patient encounter.       REVIEW OF SYSTEMS  Constitutional: No fevers, chills, or recent illness.  Skin: No rashes or diaphoresis.  Eyes: No change in vision, no discharge.  ENT: No hearing change. No rhinorrhea or nasal congestion, no ST or difficulty swallowing.  Respiratory: No SOB. No coughing or hemoptysis. No Wheezing.  Cardiac: Right sided CP, No palpitations, edema. No PND or orthopnea.  GI: Epigastric, suprapubic, and right flank pain. No N/V; " diarrhea, constipation. No blood in stool.  : No dysuria. No D/C. No frequency or urgency. No hesitancy.   MSK: Left heel pain.  Neuro: No HA or paresthesias. No focal weakness.  Psych: No SI, HI, AH, VH.  Endocrine: No polyuria or polydipsia. No heat or cold intolerance.  Heme: No easy bruising. No history of bleeding disorders or anemia.    PAST MEDICAL HISTORY   has a past medical history of Arthritis, Asthma, Dyslipidemia (12/27/2016), GERD (gastroesophageal reflux disease) (12/27/2016), History of seizures (12/27/2016), Hypertension, Hypothyroidism (12/27/2016), Mental retardation, mild (I.Q. 50-70) (12/27/2016), and Tobacco dependence (12/27/2016).    SOCIAL HISTORY  Social History     Tobacco Use   • Smoking status: Current Every Day Smoker     Packs/day: 1.00     Years: 12.00     Pack years: 12.00     Types: Cigarettes   • Smokeless tobacco: Never Used   Substance and Sexual Activity   • Alcohol use: No     Alcohol/week: 0.0 oz   • Drug use: No   • Sexual activity: Not Currently     Partners: Male       SURGICAL HISTORY  patient denies any surgical history    CURRENT MEDICATIONS  Current Outpatient Medications   Medication Instructions   • acetaminophen (TYLENOL) 500 MG Tab ACETAMINOPHEN 500 MG TABS   • Diclofenac Sodium 1 % Gel 1 Application, Topical, PRN   • Disposable Gloves (LATEX GLOVES LARGE) Misc LATEX GLOVES LARGE   • ibuprofen (MOTRIN) 800 MG Tab IBUPROFEN 800 MG TABS   • levothyroxine (SYNTHROID) 50 mcg, Oral, EACH MORNING ON EMPTY STOMACH   • loratadine (CLARITIN) 10 mg, Oral, EVERY DAY, TAKE 1 TAB BY MOUTH EVERY DAY.   • naproxen (NAPROSYN) 500 mg, Oral, 2 TIMES DAILY WITH MEALS   • nicotine polacrilex (NICORETTE) 4 MG gum NICOTINE POLACRILEX 4 MG GUM   • olopatadine (PATANOL) 0.1 % ophthalmic solution 1 Drop, Both Eyes, 2 TIMES DAILY   • olopatadine (PATANOL) 0.1 % ophthalmic solution OLOPATADINE HCL 0.1 % SOLN   • Omega-3 Fatty Acids (FISH OIL) 500 MG Cap FISH  MG CAPS   • omeprazole  "(PRILOSEC) 20 MG delayed-release capsule OMEPRAZOLE 20 MG CPDR   • paliperidone (INVEGA) 3 mg, Oral   • polyethylene glycol 3350 (MIRALAX) Powder POLYETHYLENE GLYCOL 3350 POWD          ALLERGIES  No Known Allergies    PHYSICAL EXAM  VITAL SIGNS: /87   Pulse 69   Temp 36.5 °C (97.7 °F) (Temporal)   Resp 16   Ht 1.753 m (5' 9\")   Wt (!) 127.2 kg (280 lb 6.8 oz)   SpO2 96%   BMI 41.41 kg/m²    Pulse ox interpretation: I interpret this pulse ox as normal.  Genl: Female sitting in chair comfortably, speaking clearly, non-toxic appearing, appears in no acute distress   Head: NC/AT   ENT: Mucous membranes moist, posterior pharynx clear, uvula midline, nares patent bilaterally  Eyes: Normal sclera, pupils equal round reactive to light  Neck: Supple, FROM, no LAD appreciated, no bruit.  Pulmonary: Lungs are clear to auscultation bilaterally  Chest: Reproducible chest wall tenderness along the right pectoral. No pain with AP or lateral compression, no pain with deep inspiration.  CV:  RRR, no murmur appreciated, pulses 2+ in both upper and lower extremities,  Abdomen: Epigastric, RUQ, and right flank tenderness to palpation, no true Moreira's sign, no McBurney's point tnednerses, positive suprapubic discomfort  Musculoskeletal: Pain free ROM of the neck. Moving upper and lower extremities and spontaneous in coordinated fashion  Neuro: A&Ox4 (person, place, time, situation), speech fluent, gait steady, no focal deficits appreciated, No cerebellar signs.  Psych: Patient has an appropriate affect and behavior  Skin: No rash or lesions.  No pallor or jaundice.  No cyanosis.  Warm and dry.     DIAGNOSTIC STUDIES / PROCEDURES    EKG  Results for orders placed or performed during the hospital encounter of 07/11/20   EKG (NOW)   Result Value Ref Range    Report       [facility] Emergency Dept.    Test Date:  2020-07-11  Pt Name:    MORALES GOODEN             Department: ER  MRN:        [id number]        "               Room:  Gender:     Female                       Technician: [id number]  :        1975                   Requested By:ER TRIAGE PROTOCOL  Order #:    [id number]                    Reading MD: Pal Valles MD    Measurements  Intervals                                Axis  Rate:       69                           P:          36  TN:         160                          QRS:        35  QRSD:       96                           T:          37  QT:         392  QTc:        420    Interpretive Statements  SINUS RHYTHM  Compared to ECG 2019 21:52:20  Accelerated junctional rhythm no longer present  Myocardial infarct finding no longer present  Electronically Signed On 2020 20:27:46 PDT by Pal Valles MD        LABS  Labs Reviewed   CBC WITH DIFFERENTIAL - Abnormal; Notable for the following components:       Result Value    MCHC 32.9 (*)     All other components within normal limits   URINALYSIS,CULTURE IF INDICATED - Abnormal; Notable for the following components:    Character Turbid (*)     Leukocyte Esterase Small (*)     Occult Blood Trace (*)     All other components within normal limits   URINE MICROSCOPIC (W/UA) - Abnormal; Notable for the following components:    Bacteria Few (*)     All other components within normal limits   COMP METABOLIC PANEL   LIPASE   TROPONIN   HCG QUAL SERUM   ESTIMATED GFR     RADIOLOGY  DX-OS CALCIS (HEEL) 2+ LEFT   Final Result      Calcaneal spurring. No acute bony abnormality.      DX-CHEST-PORTABLE (1 VIEW)   Final Result      No acute cardiopulmonary abnormality.        COURSE & MEDICAL DECISION MAKING  Pertinent Labs & Imaging studies reviewed. (See chart for details)    Differential diagnoses include but not limited to: Epigastric abdominal pain: PUD, pancreatitis, gastritis, GERD, cholelithiasis, cholecystitis, choledocolithiasis, esophagitis, msk pain, muscle spasms, dehydration, nondispalce rib fracture, kidney stone, and UTI.    8:00 PM - Patient seen  and examined at bedside. Patient will be treated with GI cocktail, Pepcid 20 mg, and Zofran 4 mg. Ordered CXR, EKG, CBC w/ diff, CMP, lipase, troponin, UA w/ culture if indicated, and HCG qual to evaluate her symptoms.    9:41 PM -  Patient reevaluated at bedside. Discussed lab and radiology results with the patient and informed them of the plan for discharge.  Patient understands her diagnoses and the prescription for Macrobid. Return precautions discussed.     Medical Decision Making:   Patient presents emergency room for symptoms as described above.  The patient has multiple acute complaints however the thing that was most concerning to her was the right anterior chest wall discomfort with some extension of discomfort in the lower right side of the abdomen.  The patient's initial abdominal exam is benign with no signs of peritonitis.  She has some other incidental concerns regarding the possibility of worsening pain on the left portion of her heel.  Again this is a very benign portion of the exam with no evidence of cellulitis, abscess and no reproducible pain.  X-rays obtained of the heel are unremarkable beyond bone spurring which is known to the patient.  Chest discomfort is more reproducible, not acutely pleuritic and unlikely to be PE based on the lack of risk factors and vital signs.  EKG is without any acute arrhythmia or ischemia or right heart strain, troponin not elevated and unlikely to be ACS.  She is not pregnant, urinalysis does show turbidity and development of leukocyte esterase with some inflammatory changes.  The patient has a developmental delay and with her dysuria I would initiate her on Macrobid and give her very strict return precautions should she develop any worsening flank discomfort, development of fevers as this may be the signs of pyelonephritis and would prompt reevaluation and repeat blood work.    She will treat her musculoskeletal complaints with over-the-counter  anti-inflammatories.  She is agreeable to the current plan, understands the importance of taking the medication as directed, and understands the strict return precautions.  She is discharged home in stable condition.    DISPOSITION:  Patient will be discharged home in stable condition.    FOLLOW UP:  Cong Perez M.D.  [address] [address]  [phone]    Schedule an appointment as soon as possible for a visit       [facility], Emergency Dept  [address] [address] [phone]    If symptoms worsen    OUTPATIENT MEDICATIONS:  New Prescriptions    NITROFURANTOIN (MACROBID) 100 MG CAP    Take 1 Cap by mouth 2 times a day for 3 days.     FINAL IMPRESSION  Visit Diagnoses     ICD-10-CM   1. Chest wall pain  R07.89   2. Acute cystitis without hematuria  N30.00   3. Left foot pain  M79.672     Manish SOSA (Scribe), am scribing for, and in the presence of, Hai Aguillon M.D..    Electronically signed by: Manish Villegas (Scribe), 7/11/2020    Hai SOSA M.D. personally performed the services described in this documentation, as scribed by Manish Villegas in my presence, and it is both accurate and complete.    The note accurately reflects work and decisions made by me.  Hai Aguillon M.D.  7/11/2020  11:07 PM

## 2020-08-16 NOTE — TELEPHONE ENCOUNTER
Last seen: 10/29/18 by Dr. Perez  Next appt: 04/23/19 with Dr. Perez    Was the patient seen in the last year in this department? Yes   Does patient have an active prescription for medications requested? No   Received Request Via: Pharmacy  
Standing/Toileting/Walking

## 2020-10-16 ENCOUNTER — TELEPHONE (OUTPATIENT)
Dept: PULMONOLOGY | Facility: HOSPICE | Age: 45
End: 2020-10-16

## 2020-10-16 NOTE — TELEPHONE ENCOUNTER
Patient calling from [facility] [address]. She wanted us to refill her omeprazole BUT she is not a pulmonary patient.    She use to live ain a group home and no longer does that. I said she would need to call her PCP Dr Cong Perez     Originally the medication was prescribed at urgent care in 2019.    I transferred her to the switch board to try to connect her with Dr Perez's office.

## 2021-04-11 ENCOUNTER — HOSPITAL ENCOUNTER (EMERGENCY)
Dept: HOSPITAL 8 - ED | Age: 46
Discharge: HOME | End: 2021-04-11
Payer: MEDICARE

## 2021-04-11 VITALS — SYSTOLIC BLOOD PRESSURE: 135 MMHG | DIASTOLIC BLOOD PRESSURE: 83 MMHG

## 2021-04-11 VITALS — WEIGHT: 211.64 LBS | BODY MASS INDEX: 31.35 KG/M2 | HEIGHT: 69 IN

## 2021-04-11 DIAGNOSIS — H92.01: ICD-10-CM

## 2021-04-11 DIAGNOSIS — R07.89: ICD-10-CM

## 2021-04-11 DIAGNOSIS — R06.02: ICD-10-CM

## 2021-04-11 DIAGNOSIS — R42: Primary | ICD-10-CM

## 2021-04-11 DIAGNOSIS — R51.9: ICD-10-CM

## 2021-04-11 LAB
ALBUMIN SERPL-MCNC: 4.1 G/DL (ref 3.4–5)
ALP SERPL-CCNC: 77 U/L (ref 45–117)
ALT SERPL-CCNC: 21 U/L (ref 12–78)
ANION GAP SERPL CALC-SCNC: 4 MMOL/L (ref 5–15)
BASOPHILS # BLD AUTO: 0.1 X10^3/UL (ref 0–0.1)
BASOPHILS NFR BLD AUTO: 2 % (ref 0–1)
BILIRUB SERPL-MCNC: 0.5 MG/DL (ref 0.2–1)
CALCIUM SERPL-MCNC: 8.9 MG/DL (ref 8.5–10.1)
CHLORIDE SERPL-SCNC: 108 MMOL/L (ref 98–107)
CREAT SERPL-MCNC: 0.81 MG/DL (ref 0.55–1.02)
EOSINOPHIL # BLD AUTO: 0.1 X10^3/UL (ref 0–0.4)
EOSINOPHIL NFR BLD AUTO: 1 % (ref 1–7)
ERYTHROCYTE [DISTWIDTH] IN BLOOD BY AUTOMATED COUNT: 13.3 % (ref 9.6–15.2)
LYMPHOCYTES # BLD AUTO: 0.9 X10^3/UL (ref 1–3.4)
LYMPHOCYTES NFR BLD AUTO: 16 % (ref 22–44)
MCH RBC QN AUTO: 31.8 PG (ref 27–34.8)
MCHC RBC AUTO-ENTMCNC: 33.7 G/DL (ref 32.4–35.8)
MD: NO
MONOCYTES # BLD AUTO: 0.5 X10^3/UL (ref 0.2–0.8)
MONOCYTES NFR BLD AUTO: 10 % (ref 2–9)
NEUTROPHILS # BLD AUTO: 4.1 X10^3/UL (ref 1.8–6.8)
NEUTROPHILS NFR BLD AUTO: 72 % (ref 42–75)
PLATELET # BLD AUTO: 267 X10^3/UL (ref 130–400)
PMV BLD AUTO: 7.4 FL (ref 7.4–10.4)
PROT SERPL-MCNC: 7.8 G/DL (ref 6.4–8.2)
RBC # BLD AUTO: 4.07 X10^6/UL (ref 3.82–5.3)
TROPONIN I SERPL-MCNC: < 0.015 NG/ML (ref 0–0.04)

## 2021-04-11 PROCEDURE — 71045 X-RAY EXAM CHEST 1 VIEW: CPT

## 2021-04-11 PROCEDURE — 93005 ELECTROCARDIOGRAM TRACING: CPT

## 2021-04-11 PROCEDURE — 36415 COLL VENOUS BLD VENIPUNCTURE: CPT

## 2021-04-11 PROCEDURE — 80053 COMPREHEN METABOLIC PANEL: CPT

## 2021-04-11 PROCEDURE — 99285 EMERGENCY DEPT VISIT HI MDM: CPT

## 2021-04-11 PROCEDURE — 84484 ASSAY OF TROPONIN QUANT: CPT

## 2021-04-11 PROCEDURE — 85025 COMPLETE CBC W/AUTO DIFF WBC: CPT

## 2021-04-11 NOTE — NUR
PT AMBULATED TO BATHROOM, STEADY GAIT. PT STATES LESS DIZZY, MEDICATION 
EFFECTIVE. PT REMAINS ON MONITORS, VSS. CONT TO MONITOR.

## 2021-04-11 NOTE — NUR
PT OK FOR D/C PER ERMD. PT GIVEN TAXI VOUCHER TO ENSURE SAFE D/C HOME. PT GIVEN 
D/C INSTRUCTIONS, VERBALIZED UNDERSTANDING OF D/C. PT HAS ALL OWN BELONGINGS 
UPON D/C.

## 2021-04-11 NOTE — NUR
PT BIB [facility]. PT WITH MULTIPLE COMPLAINTS, STATES RT SIDED CP, WORSE WITH DEEP 
BREATHING. PT ALSO STATES DIZZY, HX OF SAME AND LT FOOT PAIN. PT PLACED ON 
MONITORS, VSS. EKG COMPLETED. PT AWAITING ERMD ASSESSMENT. WILL FOLLOW ORDERS.

## 2021-05-04 ENCOUNTER — HOSPITAL ENCOUNTER (EMERGENCY)
Dept: HOSPITAL 8 - ED | Age: 46
End: 2021-05-04
Payer: MEDICARE

## 2021-05-04 VITALS — SYSTOLIC BLOOD PRESSURE: 133 MMHG | DIASTOLIC BLOOD PRESSURE: 63 MMHG

## 2021-05-04 VITALS — WEIGHT: 235.89 LBS | HEIGHT: 69 IN | BODY MASS INDEX: 34.94 KG/M2

## 2021-05-04 DIAGNOSIS — K21.9: ICD-10-CM

## 2021-05-04 DIAGNOSIS — I10: ICD-10-CM
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
## 2021-05-04 DIAGNOSIS — M19.90: ICD-10-CM

## 2021-05-04 DIAGNOSIS — E78.00: ICD-10-CM
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
## 2021-05-04 DIAGNOSIS — I25.2: ICD-10-CM

## 2021-05-04 DIAGNOSIS — E03.9: ICD-10-CM

## 2021-05-04 DIAGNOSIS — G89.29: ICD-10-CM
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
## 2021-05-04 DIAGNOSIS — S60.221A: Primary | ICD-10-CM
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
## 2021-05-04 PROCEDURE — 99283 EMERGENCY DEPT VISIT LOW MDM: CPT

## 2021-05-04 PROCEDURE — 29125 APPL SHORT ARM SPLINT STATIC: CPT

## 2021-05-04 NOTE — NUR
TALKED TO MOTHER. [phone]

MOTHER STATES TO SEND DAUGHTER HOME TO 

[facility]

[address], [place]

## 2021-05-04 NOTE — NUR
BIBA. PT C/O OF PAIN ON THE POSTERIOR PART OF HER HAND. HAND NOTED TO BE MILDLY 
SWOLLEN.

CMS NOTED. PT NOTED TO BE COGNITIVELY IMPAIRED. 

VSS. ATTACHED TO MONITORS. 

1G TYLENOL GIVEN FROM [facility]

## 2021-05-06 ENCOUNTER — HOSPITAL ENCOUNTER (EMERGENCY)
Dept: HOSPITAL 8 - ED | Age: 46
Discharge: HOME | End: 2021-05-06
Payer: MEDICARE

## 2021-05-06 VITALS — DIASTOLIC BLOOD PRESSURE: 84 MMHG | SYSTOLIC BLOOD PRESSURE: 141 MMHG

## 2021-05-06 VITALS — HEIGHT: 69 IN | BODY MASS INDEX: 38.63 KG/M2 | WEIGHT: 260.81 LBS

## 2021-05-06 DIAGNOSIS — F41.1: ICD-10-CM

## 2021-05-06 DIAGNOSIS — G89.29: ICD-10-CM

## 2021-05-06 DIAGNOSIS — E03.9: ICD-10-CM

## 2021-05-06 DIAGNOSIS — K21.9: ICD-10-CM

## 2021-05-06 DIAGNOSIS — I10: ICD-10-CM

## 2021-05-06 DIAGNOSIS — M19.90: ICD-10-CM

## 2021-05-06 DIAGNOSIS — R07.89: Primary | ICD-10-CM

## 2021-05-06 DIAGNOSIS — I25.2: ICD-10-CM

## 2021-05-06 LAB
ALBUMIN SERPL-MCNC: 3.7 G/DL (ref 3.4–5)
ALP SERPL-CCNC: 71 U/L (ref 45–117)
ALT SERPL-CCNC: 17 U/L (ref 12–78)
ANION GAP SERPL CALC-SCNC: 3 MMOL/L (ref 5–15)
BASOPHILS # BLD AUTO: 0.1 X10^3/UL (ref 0–0.1)
BASOPHILS NFR BLD AUTO: 1 % (ref 0–1)
BILIRUB SERPL-MCNC: 0.4 MG/DL (ref 0.2–1)
CALCIUM SERPL-MCNC: 8.4 MG/DL (ref 8.5–10.1)
CHLORIDE SERPL-SCNC: 111 MMOL/L (ref 98–107)
CREAT SERPL-MCNC: 0.74 MG/DL (ref 0.55–1.02)
EOSINOPHIL # BLD AUTO: 0.1 X10^3/UL (ref 0–0.4)
EOSINOPHIL NFR BLD AUTO: 2 % (ref 1–7)
ERYTHROCYTE [DISTWIDTH] IN BLOOD BY AUTOMATED COUNT: 13.9 % (ref 9.6–15.2)
LYMPHOCYTES # BLD AUTO: 1.4 X10^3/UL (ref 1–3.4)
LYMPHOCYTES NFR BLD AUTO: 22 % (ref 22–44)
MCH RBC QN AUTO: 31.9 PG (ref 27–34.8)
MCHC RBC AUTO-ENTMCNC: 33.8 G/DL (ref 32.4–35.8)
MD: NO
MONOCYTES # BLD AUTO: 0.5 X10^3/UL (ref 0.2–0.8)
MONOCYTES NFR BLD AUTO: 8 % (ref 2–9)
NEUTROPHILS # BLD AUTO: 4.3 X10^3/UL (ref 1.8–6.8)
NEUTROPHILS NFR BLD AUTO: 68 % (ref 42–75)
PLATELET # BLD AUTO: 273 X10^3/UL (ref 130–400)
PMV BLD AUTO: 7.6 FL (ref 7.4–10.4)
PROT SERPL-MCNC: 7.2 G/DL (ref 6.4–8.2)
RBC # BLD AUTO: 3.8 X10^6/UL (ref 3.82–5.3)
TROPONIN I SERPL-MCNC: < 0.015 NG/ML (ref 0–0.04)

## 2021-05-06 PROCEDURE — 99285 EMERGENCY DEPT VISIT HI MDM: CPT

## 2021-05-06 PROCEDURE — 84484 ASSAY OF TROPONIN QUANT: CPT

## 2021-05-06 PROCEDURE — 71045 X-RAY EXAM CHEST 1 VIEW: CPT

## 2021-05-06 PROCEDURE — 85025 COMPLETE CBC W/AUTO DIFF WBC: CPT

## 2021-05-06 PROCEDURE — 80053 COMPREHEN METABOLIC PANEL: CPT

## 2021-05-06 PROCEDURE — 93005 ELECTROCARDIOGRAM TRACING: CPT

## 2021-05-06 PROCEDURE — 36415 COLL VENOUS BLD VENIPUNCTURE: CPT

## 2021-05-06 NOTE — NUR
Patient given discharge instructions and bus pass and they have confirmed that 
they understand the instructions.  Patient stable and ambulatory with steady 
gait from ED.

## 2021-05-06 NOTE — NUR
PATIENT BIB EMS WITH CHIEF C/O 9/10 SUBSTERNAL CHEST PAIN THAT STARTED AROUND 
0700 THIS MORNING AFTER ARGUMENT WITH SISTER.  PER EMS PATIENT DESCRIBED PAIN 
AS A "SHARP" PAIN, PATIENT GIVEN 324 ASA AND 0.4 MG NITRO ON SCENE, WITH NO 
RELIEF FROM CHEST PAIN.  18 GAUGE IV STARTED EN ROUTE, NO OTHER INTERVENTIONS.  
VSS EN ROUTE, 12-LEAD NEGATIVE. 



UPON ASSESSMENT PATIENT C/O 10/10 CHEST PAIN, NADN, VSS, CONNECTED TO MONITORS, 
EKG DONE, CALL LIGHT WITHIN REACH.

## 2021-05-17 ENCOUNTER — HOSPITAL ENCOUNTER (EMERGENCY)
Dept: HOSPITAL 8 - ED | Age: 46
Discharge: HOME | End: 2021-05-17
Payer: MEDICARE

## 2021-05-17 VITALS — DIASTOLIC BLOOD PRESSURE: 63 MMHG | SYSTOLIC BLOOD PRESSURE: 117 MMHG

## 2021-05-17 VITALS — HEIGHT: 69 IN | WEIGHT: 293 LBS | BODY MASS INDEX: 43.4 KG/M2
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
## 2021-05-17 DIAGNOSIS — S90.32XA: Primary | ICD-10-CM
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
## 2021-05-17 DIAGNOSIS — R07.2: ICD-10-CM

## 2021-05-17 DIAGNOSIS — K21.9: ICD-10-CM

## 2021-05-17 DIAGNOSIS — I25.2: ICD-10-CM
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
## 2021-05-17 DIAGNOSIS — I10: ICD-10-CM

## 2021-05-17 DIAGNOSIS — F17.200: ICD-10-CM

## 2021-05-17 LAB
ALBUMIN SERPL-MCNC: 3.9 G/DL (ref 3.4–5)
ALP SERPL-CCNC: 79 U/L (ref 45–117)
ALT SERPL-CCNC: 22 U/L (ref 12–78)
ANION GAP SERPL CALC-SCNC: 4 MMOL/L (ref 5–15)
BASOPHILS # BLD AUTO: 0.1 X10^3/UL (ref 0–0.1)
BASOPHILS NFR BLD AUTO: 1 % (ref 0–1)
BILIRUB SERPL-MCNC: 0.2 MG/DL (ref 0.2–1)
CALCIUM SERPL-MCNC: 8.8 MG/DL (ref 8.5–10.1)
CHLORIDE SERPL-SCNC: 109 MMOL/L (ref 98–107)
CREAT SERPL-MCNC: 0.88 MG/DL (ref 0.55–1.02)
EOSINOPHIL # BLD AUTO: 0.2 X10^3/UL (ref 0–0.4)
EOSINOPHIL NFR BLD AUTO: 2 % (ref 1–7)
ERYTHROCYTE [DISTWIDTH] IN BLOOD BY AUTOMATED COUNT: 13.5 % (ref 9.6–15.2)
LYMPHOCYTES # BLD AUTO: 2.8 X10^3/UL (ref 1–3.4)
LYMPHOCYTES NFR BLD AUTO: 28 % (ref 22–44)
MCH RBC QN AUTO: 31.7 PG (ref 27–34.8)
MCHC RBC AUTO-ENTMCNC: 33.7 G/DL (ref 32.4–35.8)
MD: NO
MONOCYTES # BLD AUTO: 0.7 X10^3/UL (ref 0.2–0.8)
MONOCYTES NFR BLD AUTO: 7 % (ref 2–9)
NEUTROPHILS # BLD AUTO: 6.2 X10^3/UL (ref 1.8–6.8)
NEUTROPHILS NFR BLD AUTO: 62 % (ref 42–75)
PLATELET # BLD AUTO: 317 X10^3/UL (ref 130–400)
PMV BLD AUTO: 7.2 FL (ref 7.4–10.4)
PROT SERPL-MCNC: 7.7 G/DL (ref 6.4–8.2)
RBC # BLD AUTO: 4.04 X10^6/UL (ref 3.82–5.3)
TROPONIN I SERPL-MCNC: < 0.015 NG/ML (ref 0–0.04)

## 2021-05-17 PROCEDURE — 85025 COMPLETE CBC W/AUTO DIFF WBC: CPT

## 2021-05-17 PROCEDURE — 93005 ELECTROCARDIOGRAM TRACING: CPT

## 2021-05-17 PROCEDURE — 80053 COMPREHEN METABOLIC PANEL: CPT

## 2021-05-17 PROCEDURE — 71045 X-RAY EXAM CHEST 1 VIEW: CPT

## 2021-05-17 PROCEDURE — 36415 COLL VENOUS BLD VENIPUNCTURE: CPT

## 2021-05-17 PROCEDURE — 83880 ASSAY OF NATRIURETIC PEPTIDE: CPT

## 2021-05-17 PROCEDURE — 99285 EMERGENCY DEPT VISIT HI MDM: CPT

## 2021-05-17 PROCEDURE — 84484 ASSAY OF TROPONIN QUANT: CPT

## 2021-05-17 NOTE — NUR
PT AMBULATORY TO ROOM 36 W/ C/O CP, COUGH, HA STARTED AROUND 1800 AFTER PT LEFT 
WORK. PT ALSO STATES SHE WAS AT WORK AND A CART HIT THE CBACK OF HER LEG AND 
NOW HAS C/O L LEG PAIN. NO DEFORMITIES/SWELLING NOTED. FULL ROM. PT RESTING ON 
GURNEY. NADN. MONITORS APPLIED. VSS. WARM BLANKET PROVIDED.

## 2021-06-05 ENCOUNTER — HOSPITAL ENCOUNTER (EMERGENCY)
Facility: MEDICAL CENTER | Age: 46
End: 2021-06-05
Attending: EMERGENCY MEDICINE
Payer: MEDICARE

## 2021-06-05 VITALS
TEMPERATURE: 98.6 F | WEIGHT: 254.19 LBS | SYSTOLIC BLOOD PRESSURE: 150 MMHG | HEIGHT: 69 IN | DIASTOLIC BLOOD PRESSURE: 86 MMHG | BODY MASS INDEX: 37.65 KG/M2 | RESPIRATION RATE: 18 BRPM | OXYGEN SATURATION: 93 % | HEART RATE: 63 BPM

## 2021-06-05 DIAGNOSIS — M72.2 PLANTAR FASCIITIS: ICD-10-CM

## 2021-06-05 DIAGNOSIS — R51.9 NONINTRACTABLE HEADACHE, UNSPECIFIED CHRONICITY PATTERN, UNSPECIFIED HEADACHE TYPE: ICD-10-CM

## 2021-06-05 LAB — EKG IMPRESSION: NORMAL

## 2021-06-05 PROCEDURE — 93005 ELECTROCARDIOGRAM TRACING: CPT | Performed by: EMERGENCY MEDICINE

## 2021-06-05 PROCEDURE — 700102 HCHG RX REV CODE 250 W/ 637 OVERRIDE(OP): Performed by: EMERGENCY MEDICINE

## 2021-06-05 PROCEDURE — A9270 NON-COVERED ITEM OR SERVICE: HCPCS | Performed by: EMERGENCY MEDICINE

## 2021-06-05 PROCEDURE — 99283 EMERGENCY DEPT VISIT LOW MDM: CPT

## 2021-06-05 PROCEDURE — 93005 ELECTROCARDIOGRAM TRACING: CPT

## 2021-06-05 RX ORDER — DIPHENHYDRAMINE HCL 25 MG
25 TABLET ORAL ONCE
Status: COMPLETED | OUTPATIENT
Start: 2021-06-05 | End: 2021-06-05

## 2021-06-05 RX ORDER — METOCLOPRAMIDE 10 MG/1
10 TABLET ORAL ONCE
Status: COMPLETED | OUTPATIENT
Start: 2021-06-05 | End: 2021-06-05

## 2021-06-05 RX ADMIN — METOCLOPRAMIDE 10 MG: 10 TABLET ORAL at 21:45

## 2021-06-05 RX ADMIN — DIPHENHYDRAMINE HYDROCHLORIDE 25 MG: 25 TABLET ORAL at 21:45

## 2021-06-05 ASSESSMENT — PAIN DESCRIPTION - PAIN TYPE: TYPE: ACUTE PAIN

## 2021-06-05 ASSESSMENT — FIBROSIS 4 INDEX: FIB4 SCORE: 0.62

## 2021-06-06 NOTE — ED TRIAGE NOTES
"Chief Complaint   Patient presents with   • Headache     Pt bib EMS from the patient's home for headaches. She states to them that she is a hypochondriac but recently, she has been experiencing intermittent headaches and dizziness and chest pain \"while I'm at work.\"    • Foot Pain     Pt states she has a bone spur on her right heel that hurts when she walks.        /85   Pulse 83   Temp 36.9 °C (98.5 °F) (Temporal)   Resp 14   Ht 1.753 m (5' 9\")   Wt 115 kg (254 lb 3.1 oz)   LMP 06/02/2021 (Approximate)   SpO2 96%   BMI 37.54 kg/m²     Pt is ambulatory in and out of triage. Appropriate PPE worn throughout entire encounter. Pt placed back in the lobby and educated about triage process.    Pt has a developmental delay. Somewhat accurate historian.   "

## 2021-06-06 NOTE — ED NOTES
Discharge education provided. Discharge paperwork signed by pt. All questions answered. All belongings with pt. Pt ambulated to lobby unassisted.

## 2021-06-06 NOTE — DISCHARGE INSTRUCTIONS
Wear shoes that have higher or lower heel to change angle of your foot. Should see the doctor who previously did surgery

## 2021-06-06 NOTE — ED PROVIDER NOTES
"ED Provider Note    Scribed for Manish Saavedra M.D. by Angel Guardado. 6/5/2021  8:45 PM    Primary care provider: Cong Perez M.D. (Inactive)  Means of arrival: Walk In  History obtained from: Patient  History limited by: None    CHIEF COMPLAINT  Chief Complaint   Patient presents with    Headache     Pt bib EMS from the patient's home for headaches. She states to them that she is a hypochondriac but recently, she has been experiencing intermittent headaches and dizziness and chest pain \"while I'm at work.\"     Foot Pain     Pt states she has a bone spur on her right heel that hurts when she walks.        HPI  Frances Ardon is a 45 y.o. female who presents to the Emergency Department via ambulance for evaluation of intermittent headaches she experiences while at work. She tells EMS that she is a hypochondriac. She also complains of intermittent dizziness (\"room is spinning\") and chest pain while at work. Patient notes she is also experiencing right foot pain. She notes she was seen in [facility] previously and was told she has a bone spur on her heel that is painful when she walks. She notes she lives upstairs and has difficulty getting to her home secondary to her foot pain. She notes she has medicated with Tylenol, but it has not alleviated her symptoms.     I verified that the patient was wearing a mask and I was wearing appropriate PPE every time I entered the room. The patient's mask was on the patient at all times during my encounter except for a brief view of the oropharynx.    REVIEW OF SYSTEMS  Pertinent positives include headaches, dizziness, chest pain, foot pain. See HPI for further details.     PAST MEDICAL HISTORY   has a past medical history of Arthritis, Asthma, Dyslipidemia (12/27/2016), GERD (gastroesophageal reflux disease) (12/27/2016), History of seizures (12/27/2016), Hypertension, Hypothyroidism (12/27/2016), Mental retardation, mild (I.Q. 50-70) (12/27/2016), and Tobacco dependence " "(12/27/2016).    SURGICAL HISTORY  patient denies any surgical history    SOCIAL HISTORY  Social History     Tobacco Use    Smoking status: Current Every Day Smoker     Packs/day: 1.00     Years: 12.00     Pack years: 12.00     Types: Cigarettes    Smokeless tobacco: Never Used   Vaping Use    Vaping Use: Never used   Substance Use Topics    Alcohol use: No     Alcohol/week: 0.0 oz    Drug use: No      Social History     Substance and Sexual Activity   Drug Use No       FAMILY HISTORY  Family History   Problem Relation Age of Onset    Cancer Neg Hx        CURRENT MEDICATIONS  Current Outpatient Medications   Medication Instructions    acetaminophen (TYLENOL) 500 MG Tab ACETAMINOPHEN 500 MG TABS    Diclofenac Sodium 1 % Gel 1 Application, Topical, PRN    Disposable Gloves (LATEX GLOVES LARGE) Misc LATEX GLOVES LARGE    ibuprofen (MOTRIN) 800 MG Tab IBUPROFEN 800 MG TABS    levothyroxine (SYNTHROID) 50 mcg, Oral, EACH MORNING ON EMPTY STOMACH    loratadine (CLARITIN) 10 mg, Oral, EVERY DAY, TAKE 1 TAB BY MOUTH EVERY DAY.    naproxen (NAPROSYN) 500 mg, Oral, 2 TIMES DAILY WITH MEALS    nicotine polacrilex (NICORETTE) 4 MG gum NICOTINE POLACRILEX 4 MG GUM    olopatadine (PATANOL) 0.1 % ophthalmic solution 1 Drop, Both Eyes, 2 TIMES DAILY    olopatadine (PATANOL) 0.1 % ophthalmic solution OLOPATADINE HCL 0.1 % SOLN    Omega-3 Fatty Acids (FISH OIL) 500 MG Cap FISH  MG CAPS    omeprazole (PRILOSEC) 20 MG delayed-release capsule OMEPRAZOLE 20 MG CPDR    paliperidone (INVEGA) 3 mg, Oral    polyethylene glycol 3350 (MIRALAX) Powder POLYETHYLENE GLYCOL 3350 POWD       ALLERGIES  No Known Allergies    PHYSICAL EXAM  VITAL SIGNS: /79   Pulse 68   Temp 36.9 °C (98.5 °F) (Temporal)   Resp 18   Ht 1.753 m (5' 9\")   Wt 115 kg (254 lb 3.1 oz)   LMP 06/02/2021 (Approximate)   SpO2 94%   BMI 37.54 kg/m²      Constitutional: Well developed, Well nourished,no acute distress, Non-toxic appearance.   HENT: " Normocephalic, Atraumatic.  Oropharynx moist.   Eyes: PERRL, EOMI, Conjunctiva normal, No discharge.   Neck: no anterior cervical lymphadenopathy  CV: Good pulses  Thorax & Lungs: No respiratory distress.   Abdomen:  Soft, non-tender.  No rebound, no peritoneal signs.   Skin: Warm, Dry, No erythema, No rash.    Musculoskeletal: Tenderness over the heel, likely plantar fasciitis no major deformities noted.   Neurologic: Awake, alert. Moves all extremities spontaneously.  Psychiatric: Affect normal, Mood normal.     LABS  Results for orders placed or performed during the hospital encounter of 21   EKG   Result Value Ref Range    Report       [facility] Emergency Dept.    Test Date:  2021  Pt Name:    MORALES GOODEN             Department: ER  MRN:        [id number]                      Room:  Gender:     Female                       Technician: [id number]  :        [id number]                   Requested By:ER TRIAGE PROTOCOL  Order #:    [id number]                    Reading MD:    Measurements  Intervals                                Axis  Rate:       73                           P:          37  NJ:         157                          QRS:        25  QRSD:       107                          T:          25  QT:         370  QTc:        408    Interpretive Statements  Sinus rhythm  Compared to ECG 2020 19:29:57  No significant changes        .    COURSE & MEDICAL DECISION MAKING  Nursing notes, VS, PMSFHx reviewed in chart.     This is a patient with probable plantar fasciitis or   pain related to prior surgery of foot.  There is not appear to be any emergent condition present I will give her instructions related plantar fasciitis and recommend she follow-up with the physician who previously did surgery on this foot.  She has headache but a typical headache for her.  We will refer her back to her primary for follow-up there I did treat her with some Reglan and Benadryl here will  hopefully improve her headache.      The patient will return for new or worsening symptoms and is stable at the time of discharge.      DISPOSITION:  Patient will be discharged home in stable condition.    FOLLOW UP:  [address] [address] [phone]        OUTPATIENT MEDICATIONS:  New Prescriptions    No medications on file       FINAL IMPRESSION  1. Nonintractable headache, unspecified chronicity pattern, unspecified headache type    2. Plantar fasciitis          Angel SOSA (Anuj), am scribing for, and in the presence of, Manish Saavedra M.D..    Electronically signed by: Angel Guardado (Anuj), 6/5/2021    IManish M.D. personally performed the services described in this documentation, as scribed by Angel Guardado in my presence, and it is both accurate and complete. E.    The note accurately reflects work and decisions made by me.  Manish Saavedra M.D.  6/5/2021  9:53 PM

## 2021-06-06 NOTE — ED NOTES
Patient to 12H, vitals taken and patient placed on monitor.  Patient states that headache is 10/10 intermittently and her left foot hurts at her bone spur.

## 2021-06-21 ENCOUNTER — HOSPITAL ENCOUNTER (EMERGENCY)
Dept: HOSPITAL 8 - ED | Age: 46
Discharge: HOME | End: 2021-06-21
Payer: MEDICARE

## 2021-06-21 VITALS — DIASTOLIC BLOOD PRESSURE: 85 MMHG | SYSTOLIC BLOOD PRESSURE: 166 MMHG

## 2021-06-21 VITALS — HEIGHT: 69 IN | WEIGHT: 257.94 LBS | BODY MASS INDEX: 38.2 KG/M2

## 2021-06-21 DIAGNOSIS — T50.B95A: ICD-10-CM

## 2021-06-21 DIAGNOSIS — R07.89: ICD-10-CM

## 2021-06-21 DIAGNOSIS — M79.10: ICD-10-CM
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
## 2021-06-21 DIAGNOSIS — G44.209: Primary | ICD-10-CM

## 2021-06-21 LAB
ALBUMIN SERPL-MCNC: 4 G/DL (ref 3.4–5)
ANION GAP SERPL CALC-SCNC: 6 MMOL/L (ref 5–15)
BASOPHILS # BLD AUTO: 0.1 X10^3/UL (ref 0–0.1)
BASOPHILS NFR BLD AUTO: 1 % (ref 0–1)
CALCIUM SERPL-MCNC: 8.8 MG/DL (ref 8.5–10.1)
CHLORIDE SERPL-SCNC: 112 MMOL/L (ref 98–107)
CREAT SERPL-MCNC: 1.04 MG/DL (ref 0.55–1.02)
EOSINOPHIL # BLD AUTO: 0.2 X10^3/UL (ref 0–0.4)
EOSINOPHIL NFR BLD AUTO: 2 % (ref 1–7)
ERYTHROCYTE [DISTWIDTH] IN BLOOD BY AUTOMATED COUNT: 13.5 % (ref 9.6–15.2)
LYMPHOCYTES # BLD AUTO: 2.4 X10^3/UL (ref 1–3.4)
LYMPHOCYTES NFR BLD AUTO: 21 % (ref 22–44)
MCH RBC QN AUTO: 31.3 PG (ref 27–34.8)
MCHC RBC AUTO-ENTMCNC: 33.4 G/DL (ref 32.4–35.8)
MONOCYTES # BLD AUTO: 0.7 X10^3/UL (ref 0.2–0.8)
MONOCYTES NFR BLD AUTO: 6 % (ref 2–9)
NEUTROPHILS # BLD AUTO: 7.9 X10^3/UL (ref 1.8–6.8)
NEUTROPHILS NFR BLD AUTO: 69 % (ref 42–75)
PLATELET # BLD AUTO: 321 X10^3/UL (ref 130–400)
PMV BLD AUTO: 7.3 FL (ref 7.4–10.4)
RBC # BLD AUTO: 4 X10^6/UL (ref 3.82–5.3)

## 2021-06-21 PROCEDURE — 99284 EMERGENCY DEPT VISIT MOD MDM: CPT

## 2021-06-21 PROCEDURE — 85025 COMPLETE CBC W/AUTO DIFF WBC: CPT

## 2021-06-21 PROCEDURE — 36415 COLL VENOUS BLD VENIPUNCTURE: CPT

## 2021-06-21 PROCEDURE — 83880 ASSAY OF NATRIURETIC PEPTIDE: CPT

## 2021-06-21 PROCEDURE — 82040 ASSAY OF SERUM ALBUMIN: CPT

## 2021-06-21 PROCEDURE — 71045 X-RAY EXAM CHEST 1 VIEW: CPT

## 2021-06-21 PROCEDURE — 80048 BASIC METABOLIC PNL TOTAL CA: CPT

## 2021-07-05 ENCOUNTER — APPOINTMENT (OUTPATIENT)
Dept: RADIOLOGY | Facility: MEDICAL CENTER | Age: 46
End: 2021-07-05
Attending: EMERGENCY MEDICINE
Payer: MEDICARE

## 2021-07-05 ENCOUNTER — HOSPITAL ENCOUNTER (EMERGENCY)
Facility: MEDICAL CENTER | Age: 46
End: 2021-07-06
Attending: EMERGENCY MEDICINE
Payer: MEDICARE

## 2021-07-05 VITALS
DIASTOLIC BLOOD PRESSURE: 77 MMHG | BODY MASS INDEX: 38.53 KG/M2 | SYSTOLIC BLOOD PRESSURE: 122 MMHG | TEMPERATURE: 97.7 F | WEIGHT: 260.14 LBS | HEIGHT: 69 IN | RESPIRATION RATE: 18 BRPM | OXYGEN SATURATION: 95 % | HEART RATE: 81 BPM

## 2021-07-05 DIAGNOSIS — S93.402A SPRAIN OF LEFT ANKLE, UNSPECIFIED LIGAMENT, INITIAL ENCOUNTER: ICD-10-CM

## 2021-07-05 DIAGNOSIS — S93.602A FOOT SPRAIN, LEFT, INITIAL ENCOUNTER: ICD-10-CM

## 2021-07-05 PROCEDURE — A9270 NON-COVERED ITEM OR SERVICE: HCPCS | Performed by: EMERGENCY MEDICINE

## 2021-07-05 PROCEDURE — 700102 HCHG RX REV CODE 250 W/ 637 OVERRIDE(OP): Performed by: EMERGENCY MEDICINE

## 2021-07-05 PROCEDURE — 73630 X-RAY EXAM OF FOOT: CPT | Mod: LT

## 2021-07-05 PROCEDURE — 73610 X-RAY EXAM OF ANKLE: CPT | Mod: LT

## 2021-07-05 PROCEDURE — 99284 EMERGENCY DEPT VISIT MOD MDM: CPT

## 2021-07-05 RX ORDER — IBUPROFEN 600 MG/1
600 TABLET ORAL ONCE
Status: COMPLETED | OUTPATIENT
Start: 2021-07-05 | End: 2021-07-05

## 2021-07-05 RX ADMIN — IBUPROFEN 600 MG: 600 TABLET, FILM COATED ORAL at 22:42

## 2021-07-05 ASSESSMENT — FIBROSIS 4 INDEX: FIB4 SCORE: 0.62

## 2021-07-06 NOTE — ED PROVIDER NOTES
ED Provider Note    CHIEF COMPLAINT  Chief Complaint   Patient presents with   • Foot Pain     left foot pain since 1530 today, injured at work, CMS       HPI  Frances Ardon is a 45 y.o. female who presents to the emergency department through triage for left ankle and foot pain and swelling.  Pain is worse with range of motion weightbearing.  Patient states she had a mechanical trip and fall on a bump in the ground behind her work this afternoon.  No knee pain or hip pain.  Denies head injury.  No medications for discomfort prior to arrival.    REVIEW OF SYSTEMS  See HPI for further details. All other systems are negative.     PAST MEDICAL HISTORY   has a past medical history of Arthritis, Asthma, Dyslipidemia (12/27/2016), GERD (gastroesophageal reflux disease) (12/27/2016), History of seizures (12/27/2016), Hypertension, Hypothyroidism (12/27/2016), Mental retardation, mild (I.Q. 50-70) (12/27/2016), and Tobacco dependence (12/27/2016).    SOCIAL HISTORY  Social History     Tobacco Use   • Smoking status: Current Every Day Smoker     Packs/day: 1.00     Years: 12.00     Pack years: 12.00     Types: Cigarettes   • Smokeless tobacco: Never Used   Vaping Use   • Vaping Use: Never used   Substance and Sexual Activity   • Alcohol use: No     Alcohol/week: 0.0 oz   • Drug use: No   • Sexual activity: Not Currently     Partners: Male       SURGICAL HISTORY  patient denies any surgical history    CURRENT MEDICATIONS  Home Medications     Reviewed by Tanna Rodgers R.N. (Registered Nurse) on 07/05/21 at 1949  Med List Status: Partial   Medication Last Dose Status   acetaminophen (TYLENOL) 500 MG Tab  Active   Diclofenac Sodium 1 % Gel  Active   Disposable Gloves (LATEX GLOVES LARGE) Misc  Active   ibuprofen (MOTRIN) 800 MG Tab  Active   levothyroxine (SYNTHROID) 50 MCG Tab  Active   loratadine (CLARITIN) 10 MG Tab  Active   naproxen (NAPROSYN) 500 MG Tab  Active   nicotine polacrilex (NICORETTE) 4 MG gum   "Active   olopatadine (PATANOL) 0.1 % ophthalmic solution  Active   olopatadine (PATANOL) 0.1 % ophthalmic solution  Active   Omega-3 Fatty Acids (FISH OIL) 500 MG Cap  Active   omeprazole (PRILOSEC) 20 MG delayed-release capsule  Active   paliperidone (INVEGA) 3 MG ER tablet  Active   polyethylene glycol 3350 (MIRALAX) Powder  Active                ALLERGIES  No Known Allergies    PHYSICAL EXAM  VITAL SIGNS: /85   Pulse 79   Temp 36.4 °C (97.6 °F) (Temporal)   Resp 17   Ht 1.753 m (5' 9\")   Wt 118 kg (260 lb 2.3 oz)   LMP  (Within Days)   SpO2 97%   Breastfeeding Unknown   BMI 38.42 kg/m²   Pulse ox interpretation: I interpret this pulse ox as normal.  Constitutional: Alert in no apparent distress.  HENT: Normocephalic, atraumatic. Bilateral external ears normal, Nose normal.   Eyes:Conjunctiva normal.   Neck: Normal range of motion  Cardiovascular: Normal peripheral perfusion.  Thorax & Lungs: Nonlabored respirations.  Skin: Warm, Dry  Musculoskeletal: Tenderness palpation medial and lateral malleoli, mild swelling left ankle.  Tender to palpation at the plantar foot without focal discomfort.  No crepitus or deformity.  2+ DP, less than 2-second capillary refill, sensation intact to light touch distally.  No tenderness to fibular head.  Full range of motion knee and hip.  No thoracic or lumbar discomfort, no step-offs.  Neurologic: Alert  Psychiatric: Affect normal, Judgment normal, Mood normal.       DIAGNOSTIC STUDIES / PROCEDURES    RADIOLOGY  DX-FOOT-COMPLETE 3+ LEFT   Final Result      1.  There are mild degenerative changes in the left foot and involving the calcaneus.      DX-ANKLE 3+ VIEWS LEFT   Final Result      1.  There is no acute left ankle abnormality.          COURSE & MEDICAL DECISION MAKING  ED evaluation after mechanical fall most consistent with left foot and ankle sprains.  No radiographic evidence for fracture dislocation.  CMS intact distally.  Pain controlled with Motrin.  " Ankle air splint applied for comfort.  Patient bears weight independently.  To advance weightbearing as tolerated.  Follow-up with primary care or orthopedics if symptoms persist.    Patient is stable for discharge at this time, anticipatory guidance provided, close follow-up is encouraged, and strict ED return instructions have been detailed. Patient is agreeable to the disposition and plan.    Patient's blood pressure was elevated in the emergency department, and has been referred to primary care for close monitoring.    FINAL IMPRESSION  (S93.402A) Sprain of left ankle, unspecified ligament, initial encounter  (S93.602A) Foot sprain, left, initial encounter      Electronically signed by: Frances Cisse D.O., 7/5/2021 11:29 PM      This dictation was created using voice recognition software. The accuracy of the dictation is limited to the abilities of the software. I expect there may be some errors of grammar and possibly content. The nursing notes were reviewed and certain aspects of this information were incorporated into this note.

## 2021-07-06 NOTE — ED TRIAGE NOTES
".  Chief Complaint   Patient presents with   • Foot Pain     left foot pain since 1530 today, injured at work, CMS     ./85   Pulse 79   Temp 36.4 °C (97.6 °F) (Temporal)   Resp 17   Ht 1.753 m (5' 9\")   Wt 118 kg (260 lb 2.3 oz)   LMP  (Within Days)   SpO2 97%   Breastfeeding Unknown   BMI 38.42 kg/m²     Ambulatory to triage with above, educated on triage process, placed in lobby, told to inform staff of any changes in condition   "

## 2021-07-06 NOTE — DISCHARGE INSTRUCTIONS
Follow-up with primary care 1 to 2 days for reevaluation.  Additional imaging/MRI or specialty evaluation may be necessary if pain or swelling persists.    Weightbearing as tolerated right lower extremity.  Use ankle air splint as needed for comfort and support.    Rest, ice, elevation as needed for swelling or discomfort.  Motrin 600 mg every 6 hours as needed for swelling or discomfort.      Return to the emergency department for persistent or worsening pain, swelling, discoloration, paresthesias or other new concerns.

## 2021-07-12 ENCOUNTER — APPOINTMENT (OUTPATIENT)
Dept: URGENT CARE | Facility: PHYSICIAN GROUP | Age: 46
End: 2021-07-12
Payer: MEDICARE

## 2021-07-21 ENCOUNTER — HOSPITAL ENCOUNTER (EMERGENCY)
Dept: HOSPITAL 8 - ED | Age: 46
Discharge: HOME | End: 2021-07-21
Payer: MEDICARE

## 2021-07-21 VITALS — SYSTOLIC BLOOD PRESSURE: 140 MMHG | DIASTOLIC BLOOD PRESSURE: 69 MMHG

## 2021-07-21 VITALS — HEIGHT: 69 IN | WEIGHT: 265 LBS | BODY MASS INDEX: 39.25 KG/M2

## 2021-07-21 DIAGNOSIS — S93.492A: Primary | ICD-10-CM

## 2021-07-21 DIAGNOSIS — R00.1: ICD-10-CM

## 2021-07-21 DIAGNOSIS — I10: ICD-10-CM

## 2021-07-21 DIAGNOSIS — E03.9: ICD-10-CM

## 2021-07-21 DIAGNOSIS — I25.2: ICD-10-CM
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
## 2021-07-21 DIAGNOSIS — R06.00: ICD-10-CM

## 2021-07-21 DIAGNOSIS — K21.9: ICD-10-CM

## 2021-07-21 LAB
ALBUMIN SERPL-MCNC: 3.7 G/DL (ref 3.4–5)
ALP SERPL-CCNC: 71 U/L (ref 45–117)
ALT SERPL-CCNC: 19 U/L (ref 12–78)
ANION GAP SERPL CALC-SCNC: 5 MMOL/L (ref 5–15)
BASOPHILS # BLD AUTO: 0.1 X10^3/UL (ref 0–0.1)
BASOPHILS NFR BLD AUTO: 1 % (ref 0–1)
BILIRUB SERPL-MCNC: 0.6 MG/DL (ref 0.2–1)
CALCIUM SERPL-MCNC: 8.9 MG/DL (ref 8.5–10.1)
CHLORIDE SERPL-SCNC: 112 MMOL/L (ref 98–107)
CREAT SERPL-MCNC: 1.03 MG/DL (ref 0.55–1.02)
EOSINOPHIL # BLD AUTO: 0.2 X10^3/UL (ref 0–0.4)
EOSINOPHIL NFR BLD AUTO: 3 % (ref 1–7)
ERYTHROCYTE [DISTWIDTH] IN BLOOD BY AUTOMATED COUNT: 13.6 % (ref 9.6–15.2)
LYMPHOCYTES # BLD AUTO: 1.7 X10^3/UL (ref 1–3.4)
LYMPHOCYTES NFR BLD AUTO: 23 % (ref 22–44)
MCH RBC QN AUTO: 31.5 PG (ref 27–34.8)
MCHC RBC AUTO-ENTMCNC: 34 G/DL (ref 32.4–35.8)
MONOCYTES # BLD AUTO: 0.5 X10^3/UL (ref 0.2–0.8)
MONOCYTES NFR BLD AUTO: 7 % (ref 2–9)
NEUTROPHILS # BLD AUTO: 5 X10^3/UL (ref 1.8–6.8)
NEUTROPHILS NFR BLD AUTO: 66 % (ref 42–75)
PLATELET # BLD AUTO: 278 X10^3/UL (ref 130–400)
PMV BLD AUTO: 7.1 FL (ref 7.4–10.4)
PROT SERPL-MCNC: 7.1 G/DL (ref 6.4–8.2)
RBC # BLD AUTO: 3.85 X10^6/UL (ref 3.82–5.3)
TROPONIN I SERPL-MCNC: < 0.015 NG/ML (ref 0–0.04)

## 2021-07-21 PROCEDURE — 80053 COMPREHEN METABOLIC PANEL: CPT

## 2021-07-21 PROCEDURE — 99285 EMERGENCY DEPT VISIT HI MDM: CPT

## 2021-07-21 PROCEDURE — 84484 ASSAY OF TROPONIN QUANT: CPT

## 2021-07-21 PROCEDURE — 36415 COLL VENOUS BLD VENIPUNCTURE: CPT

## 2021-07-21 PROCEDURE — 71045 X-RAY EXAM CHEST 1 VIEW: CPT

## 2021-07-21 PROCEDURE — 85025 COMPLETE CBC W/AUTO DIFF WBC: CPT

## 2021-07-21 PROCEDURE — 93005 ELECTROCARDIOGRAM TRACING: CPT

## 2021-07-21 NOTE — NUR
FIRST CONTACT: RIGHT SHOULDER AND LEFT FOOT PAIN PAIN SINCE THIS AM. CMS 
INTACT, DENIES RECENT TRAUMA. PT WITH STEADY GAIT TO BED. POSTIONED TO COMFORT. 
LARY HERNANDEZ.

## 2021-07-21 NOTE — NUR
THIS FLOAT RN AT BEDSIDE TO DC PT FOR PRIMARY RN, JAZZY. ACE WRAP APPLIED TO 
PT'S LEFT ANKLE PER PHYSICIAN ORDER. CMS INTACT FOLLOWING INTERVENTION. 
AMBULATORY TO CHECKOUT WITH STEADY GAIT. VSS.

## 2021-08-21 ENCOUNTER — HOSPITAL ENCOUNTER (EMERGENCY)
Dept: HOSPITAL 8 - ED | Age: 46
Discharge: HOME | End: 2021-08-21
Payer: MEDICARE

## 2021-08-21 VITALS — DIASTOLIC BLOOD PRESSURE: 88 MMHG | SYSTOLIC BLOOD PRESSURE: 154 MMHG

## 2021-08-21 VITALS — BODY MASS INDEX: 37.55 KG/M2 | WEIGHT: 253.53 LBS | HEIGHT: 69 IN

## 2021-08-21 DIAGNOSIS — F17.200: ICD-10-CM

## 2021-08-21 DIAGNOSIS — M19.90: ICD-10-CM

## 2021-08-21 DIAGNOSIS — E03.9: ICD-10-CM

## 2021-08-21 DIAGNOSIS — I25.2: ICD-10-CM

## 2021-08-21 DIAGNOSIS — I10: Primary | ICD-10-CM

## 2021-08-21 DIAGNOSIS — E78.00: ICD-10-CM

## 2021-08-21 DIAGNOSIS — K21.9: ICD-10-CM

## 2021-08-21 DIAGNOSIS — R42: ICD-10-CM

## 2021-08-21 LAB
ALBUMIN SERPL-MCNC: 3.8 G/DL (ref 3.4–5)
ANION GAP SERPL CALC-SCNC: 5 MMOL/L (ref 5–15)
BASOPHILS # BLD AUTO: 0.1 X10^3/UL (ref 0–0.1)
BASOPHILS NFR BLD AUTO: 1 % (ref 0–1)
CALCIUM SERPL-MCNC: 9.2 MG/DL (ref 8.5–10.1)
CHLORIDE SERPL-SCNC: 107 MMOL/L (ref 98–107)
CREAT SERPL-MCNC: 0.84 MG/DL (ref 0.55–1.02)
EOSINOPHIL # BLD AUTO: 0.2 X10^3/UL (ref 0–0.4)
EOSINOPHIL NFR BLD AUTO: 2 % (ref 1–7)
ERYTHROCYTE [DISTWIDTH] IN BLOOD BY AUTOMATED COUNT: 13.4 % (ref 9.6–15.2)
LYMPHOCYTES # BLD AUTO: 2.6 X10^3/UL (ref 1–3.4)
LYMPHOCYTES NFR BLD AUTO: 29 % (ref 22–44)
MCH RBC QN AUTO: 31.1 PG (ref 27–34.8)
MCHC RBC AUTO-ENTMCNC: 34.2 G/DL (ref 32.4–35.8)
MONOCYTES # BLD AUTO: 0.7 X10^3/UL (ref 0.2–0.8)
MONOCYTES NFR BLD AUTO: 8 % (ref 2–9)
NEUTROPHILS # BLD AUTO: 5.5 X10^3/UL (ref 1.8–6.8)
NEUTROPHILS NFR BLD AUTO: 61 % (ref 42–75)
PLATELET # BLD AUTO: 291 X10^3/UL (ref 130–400)
PMV BLD AUTO: 7.1 FL (ref 7.4–10.4)
RBC # BLD AUTO: 4.18 X10^6/UL (ref 3.82–5.3)

## 2021-08-21 PROCEDURE — 36415 COLL VENOUS BLD VENIPUNCTURE: CPT

## 2021-08-21 PROCEDURE — 93005 ELECTROCARDIOGRAM TRACING: CPT

## 2021-08-21 PROCEDURE — 80048 BASIC METABOLIC PNL TOTAL CA: CPT

## 2021-08-21 PROCEDURE — 82040 ASSAY OF SERUM ALBUMIN: CPT

## 2021-08-21 PROCEDURE — 85025 COMPLETE CBC W/AUTO DIFF WBC: CPT

## 2021-08-21 PROCEDURE — 99284 EMERGENCY DEPT VISIT MOD MDM: CPT

## 2021-08-25 ENCOUNTER — HOSPITAL ENCOUNTER (EMERGENCY)
Facility: MEDICAL CENTER | Age: 46
End: 2021-08-25
Payer: MEDICARE

## 2021-08-25 VITALS
DIASTOLIC BLOOD PRESSURE: 90 MMHG | RESPIRATION RATE: 16 BRPM | OXYGEN SATURATION: 97 % | BODY MASS INDEX: 38.3 KG/M2 | TEMPERATURE: 96.5 F | SYSTOLIC BLOOD PRESSURE: 158 MMHG | HEIGHT: 69 IN | WEIGHT: 258.6 LBS | HEART RATE: 62 BPM

## 2021-08-25 LAB — GLUCOSE BLD-MCNC: 125 MG/DL (ref 65–99)

## 2021-08-25 PROCEDURE — 82962 GLUCOSE BLOOD TEST: CPT

## 2021-08-25 PROCEDURE — 302449 STATCHG TRIAGE ONLY (STATISTIC)

## 2021-08-25 ASSESSMENT — FIBROSIS 4 INDEX: FIB4 SCORE: 0.62

## 2021-08-26 NOTE — ED TRIAGE NOTES
Frances Ardon  45 y.o. female  Chief Complaint   Patient presents with   • High Blood Sugar     Level in the 400s, not been diagnosed with diabetes       Vitals:    08/25/21 2005   BP: 158/90   Pulse: 62   Resp: 16   Temp: 35.8 °C (96.5 °F)   SpO2: 97%        Patient ambulatory into triage for the complaint above - pt reports not being diagnosed with diabetes but has high blood sugars. Pt in triage was  - reports having values over 400 PTA using a friends machine and after eating 2 peanut butter jelly sandwhiches. Pt reported that they were shaking prior to eating the sandwhiches    Patient is in stable condition at time of triage, has been educated on the triage process and placed back into the ED lobby at this time - pt has verbalized understanding of this process.     RN encouraged patient to alert staff at front for any changes that may occur while they are waiting to be evaluated by an ERP.     Of note, this RN and patient are in proper PPE and has a mask in place at all times during this encounter.     Past Medical History:   Diagnosis Date   • Arthritis    • Asthma    • Dyslipidemia 12/27/2016   • GERD (gastroesophageal reflux disease) 12/27/2016   • History of seizures 12/27/2016   • Hypertension    • Hypothyroidism 12/27/2016   • Mental retardation, mild (I.Q. 50-70) 12/27/2016   • Tobacco dependence 12/27/2016

## 2021-08-27 ENCOUNTER — HOSPITAL ENCOUNTER (EMERGENCY)
Dept: HOSPITAL 8 - ED | Age: 46
Discharge: HOME | End: 2021-08-27
Payer: MEDICARE

## 2021-08-27 VITALS — WEIGHT: 262.35 LBS | BODY MASS INDEX: 38.86 KG/M2 | HEIGHT: 69 IN

## 2021-08-27 VITALS — SYSTOLIC BLOOD PRESSURE: 165 MMHG | DIASTOLIC BLOOD PRESSURE: 87 MMHG
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
## 2021-08-27 DIAGNOSIS — I10: ICD-10-CM
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
## 2021-08-27 DIAGNOSIS — F17.200: ICD-10-CM

## 2021-08-27 DIAGNOSIS — E03.9: ICD-10-CM

## 2021-08-27 DIAGNOSIS — M19.90: ICD-10-CM

## 2021-08-27 DIAGNOSIS — S93.612A: Primary | ICD-10-CM
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
## 2021-08-27 PROCEDURE — 99283 EMERGENCY DEPT VISIT LOW MDM: CPT

## 2021-08-29 ENCOUNTER — APPOINTMENT (OUTPATIENT)
Dept: RADIOLOGY | Facility: MEDICAL CENTER | Age: 46
End: 2021-08-29
Attending: EMERGENCY MEDICINE
Payer: MEDICARE

## 2021-08-29 ENCOUNTER — HOSPITAL ENCOUNTER (EMERGENCY)
Facility: MEDICAL CENTER | Age: 46
End: 2021-08-30
Attending: EMERGENCY MEDICINE
Payer: MEDICARE

## 2021-08-29 VITALS
WEIGHT: 259.26 LBS | HEIGHT: 69 IN | DIASTOLIC BLOOD PRESSURE: 75 MMHG | HEART RATE: 59 BPM | SYSTOLIC BLOOD PRESSURE: 160 MMHG | TEMPERATURE: 97.6 F | OXYGEN SATURATION: 98 % | RESPIRATION RATE: 16 BRPM | BODY MASS INDEX: 38.4 KG/M2

## 2021-08-29 DIAGNOSIS — M25.511 ACUTE PAIN OF RIGHT SHOULDER: ICD-10-CM

## 2021-08-29 DIAGNOSIS — M79.672 LEFT FOOT PAIN: ICD-10-CM

## 2021-08-29 PROCEDURE — 99283 EMERGENCY DEPT VISIT LOW MDM: CPT

## 2021-08-29 ASSESSMENT — FIBROSIS 4 INDEX: FIB4 SCORE: 0.62

## 2021-08-30 ENCOUNTER — HOSPITAL ENCOUNTER (OUTPATIENT)
Dept: RADIOLOGY | Facility: MEDICAL CENTER | Age: 46
End: 2021-08-30
Attending: EMERGENCY MEDICINE
Payer: MEDICARE

## 2021-08-30 PROCEDURE — 73030 X-RAY EXAM OF SHOULDER: CPT | Mod: RT

## 2021-08-30 PROCEDURE — 73630 X-RAY EXAM OF FOOT: CPT | Mod: LT

## 2021-08-30 PROCEDURE — 302874 HCHG BANDAGE ACE 2 OR 3""

## 2021-08-30 NOTE — ED PROVIDER NOTES
"ER Provider Note     Scribed for Eder Chaparro M.D. by Jessika Wren. 8/29/2021, 11:40 PM.    Primary Care Provider: TWILA Escoto  Means of Arrival: Walk-in   History obtained from: Patient  History limited by: None     CHIEF COMPLAINT  Chief Complaint   Patient presents with    Shoulder Pain     right shoulder \"went out on me\", cms intact.     Leg Swelling     left foot/ankle swelling/pain.        HPI  Frances Ardon is a 45 y.o. female who presents to the Emergency Department for evaluation of acute right shoulder pain. She is also having left foot and ankle pain with associated swelling. She did not fall or or sustain any injuries. Movement exacerbates the pain. She has not taken anything for alleviation of the pain.     REVIEW OF SYSTEMS  See HPI for further details. All other systems are negative.     PAST MEDICAL HISTORY   has a past medical history of Arthritis, Asthma, Dyslipidemia (12/27/2016), GERD (gastroesophageal reflux disease) (12/27/2016), History of seizures (12/27/2016), Hypertension, Hypothyroidism (12/27/2016), Mental retardation, mild (I.Q. 50-70) (12/27/2016), and Tobacco dependence (12/27/2016).    SURGICAL HISTORY  patient denies any surgical history    SOCIAL HISTORY  Social History     Tobacco Use    Smoking status: Never Smoker    Smokeless tobacco: Never Used   Vaping Use    Vaping Use: Never used   Substance Use Topics    Alcohol use: Yes     Alcohol/week: 0.0 oz     Comment: Occasionally    Drug use: No      Social History     Substance and Sexual Activity   Drug Use No       FAMILY HISTORY  Family History   Problem Relation Age of Onset    Cancer Neg Hx        CURRENT MEDICATIONS  Home Medications       Reviewed by Lakisha Fagan R.N. (Registered Nurse) on 08/29/21 at 2040  Med List Status: Not Addressed     Medication Last Dose Status   acetaminophen (TYLENOL) 500 MG Tab  Active   Diclofenac Sodium 1 % Gel  Active   Disposable Gloves (LATEX GLOVES LARGE) Misc  " "Active   ibuprofen (MOTRIN) 800 MG Tab  Active   levothyroxine (SYNTHROID) 50 MCG Tab  Active   loratadine (CLARITIN) 10 MG Tab  Active   naproxen (NAPROSYN) 500 MG Tab  Active   nicotine polacrilex (NICORETTE) 4 MG gum  Active   olopatadine (PATANOL) 0.1 % ophthalmic solution  Active   olopatadine (PATANOL) 0.1 % ophthalmic solution  Active   Omega-3 Fatty Acids (FISH OIL) 500 MG Cap  Active   omeprazole (PRILOSEC) 20 MG delayed-release capsule  Active   paliperidone (INVEGA) 3 MG ER tablet  Active   polyethylene glycol 3350 (MIRALAX) Powder  Active                    ALLERGIES  No Known Allergies    PHYSICAL EXAM  VITAL SIGNS: /88   Pulse 65   Temp 36.4 °C (97.6 °F) (Temporal)   Resp 18   Ht 1.753 m (5' 9\")   Wt 118 kg (259 lb 4.2 oz)   LMP 08/18/2021   SpO2 99% Comment: Room air  BMI 38.29 kg/m²      Constitutional: Alert in no apparent distress.  HENT: No signs of trauma, Bilateral external ears normal, Nose normal.   Eyes: Pupils are equal and reactive, Conjunctiva normal, Non-icteric.   Neck: Normal range of motion, No tenderness, Supple, No stridor.   Lymphatic: No lymphadenopathy noted.   Cardiovascular: Regular rate and rhythm, no palpable thrill  Thorax & Lungs: No respiratory distress,  No chest tenderness.   Abdomen: Bowel sounds normal, Soft, No tenderness, No masses, No pulsatile masses. No peritoneal signs.  Skin: Warm, Dry, No erythema, No rash.   Back: No bony tenderness, No CVA tenderness.   Extremities: Intact distal pulses, No edema, No cyanosis.  Musculoskeletal: Tender over lateral aspect of left foot and mild tenderness over right shoulder. No deformities, full range of motion  Neurologic: Alert , Normal motor function, Normal sensory function, No focal deficits noted.   Psychiatric: Affect normal, Judgment normal, Mood normal.     DIAGNOSTIC STUDIES / PROCEDURES    RADIOLOGY  DX-SHOULDER 2+ RIGHT   Final Result      1.  No evidence of acute fracture or dislocation.   2.  Mild " acromioclavicular degenerative changes.      DX-FOOT-COMPLETE 3+ LEFT   Final Result      1.  No evidence of acute fracture or dislocation.   2.  Calcaneal spurring.   3.  Degenerative changes.         The radiologist's interpretation of all radiological studies have been reviewed by me.    COURSE & MEDICAL DECISION MAKING  Pertinent Labs & Imaging studies reviewed. (See chart for details)    This is a 45 y.o. female that presents with pain in her right shoulder and left foot.  These appear to be overuse injuries.  They do not appear to be infected joints.  I will x-ray both of them and then reassess..     11:40 PM - Patient seen and examined at bedside. Ordered DX-foot and DX-shoulder.     1:15 AM - Patient's imaging was reassuring. Her left foot will be ace wrapped for comfort. Discussed discharge instructions and return precautions with the patient and they were cleared for discharge. Patient was given the opportunity to ask any further questions. She is comfortable with discharge at this time.      The patient will return for new or worsening symptoms and is stable at the time of discharge.    Both x-rays are negative.  The patient will get an Ace wrap for her foot given she is asking for this.  We will give her strict return precautions and follow-up.    The patient is referred to a primary physician for blood pressure management, diabetic screening, and for all other preventative health concerns.    DISPOSITION:  Patient will be discharged home in stable condition.    FOLLOW UP:  Jac Fuentes, A.P.R.N.  [address] [place] [address]  [phone]    In 2 days      OUTPATIENT MEDICATIONS:  Discharge Medication List as of 8/30/2021  1:15 AM          FINAL IMPRESSION  1. Acute pain of right shoulder    2. Left foot pain          Jessika SOSA (Anuj), am scribing for, and in the presence of, Eder Chaparro M.D..    Electronically signed by: Jessika Dodson), 8/29/2021    Eder SOSA  HO Chaparro. personally performed the services described in this documentation, as scribed by Jessika Wren in my presence, and it is both accurate and complete.     The note accurately reflects work and decisions made by me.  Eder Chaparro M.D.  8/30/2021  3:16 AM

## 2021-08-30 NOTE — ED NOTES
"Pt discharged home to self with a steady gait and a/o4. Pt in possession of belongings. Pt provided discharge education and information pertaining to follow up appointments. Pt received copy of discharge instructions and verbalized understanding.     /75   Pulse (!) 59   Temp 36.4 °C (97.6 °F) (Temporal)   Resp 16   Ht 1.753 m (5' 9\")   Wt 118 kg (259 lb 4.2 oz)   LMP 08/18/2021   SpO2 98%   BMI 38.29 kg/m²   "

## 2021-08-30 NOTE — ED TRIAGE NOTES
"Chief Complaint   Patient presents with   • Shoulder Pain     right shoulder \"went out on me\", cms intact.    • Leg Swelling     left foot/ankle swelling/pain.    pt denies injury/trauma.   Ht 1.753 m (5' 9\")   Wt 118 kg (259 lb 4.2 oz)   LMP 08/18/2021   BMI 38.29 kg/m²       "

## 2021-09-07 ENCOUNTER — HOSPITAL ENCOUNTER (EMERGENCY)
Dept: HOSPITAL 8 - ED | Age: 46
Discharge: HOME | End: 2021-09-07
Payer: MEDICARE

## 2021-09-07 VITALS — BODY MASS INDEX: 38.14 KG/M2 | HEIGHT: 69 IN | WEIGHT: 257.5 LBS

## 2021-09-07 VITALS — SYSTOLIC BLOOD PRESSURE: 131 MMHG | DIASTOLIC BLOOD PRESSURE: 75 MMHG

## 2021-09-07 DIAGNOSIS — R30.0: ICD-10-CM

## 2021-09-07 DIAGNOSIS — E78.00: ICD-10-CM

## 2021-09-07 DIAGNOSIS — R94.31: ICD-10-CM

## 2021-09-07 DIAGNOSIS — K21.9: ICD-10-CM

## 2021-09-07 DIAGNOSIS — I25.2: ICD-10-CM

## 2021-09-07 DIAGNOSIS — Z87.891: ICD-10-CM

## 2021-09-07 DIAGNOSIS — I10: ICD-10-CM

## 2021-09-07 DIAGNOSIS — R07.89: Primary | ICD-10-CM

## 2021-09-07 DIAGNOSIS — N30.00: ICD-10-CM

## 2021-09-07 LAB
ALBUMIN SERPL-MCNC: 4 G/DL (ref 3.4–5)
ALP SERPL-CCNC: 82 U/L (ref 45–117)
ALT SERPL-CCNC: 18 U/L (ref 12–78)
ANION GAP SERPL CALC-SCNC: 3 MMOL/L (ref 5–15)
BASOPHILS # BLD AUTO: 0.1 X10^3/UL (ref 0–0.1)
BASOPHILS NFR BLD AUTO: 1 % (ref 0–1)
BILIRUB SERPL-MCNC: 0.5 MG/DL (ref 0.2–1)
CALCIUM SERPL-MCNC: 9.4 MG/DL (ref 8.5–10.1)
CHLORIDE SERPL-SCNC: 112 MMOL/L (ref 98–107)
CREAT SERPL-MCNC: 0.98 MG/DL (ref 0.55–1.02)
EOSINOPHIL # BLD AUTO: 0.2 X10^3/UL (ref 0–0.4)
EOSINOPHIL NFR BLD AUTO: 2 % (ref 1–7)
ERYTHROCYTE [DISTWIDTH] IN BLOOD BY AUTOMATED COUNT: 13.7 % (ref 9.6–15.2)
LYMPHOCYTES # BLD AUTO: 2.1 X10^3/UL (ref 1–3.4)
LYMPHOCYTES NFR BLD AUTO: 20 % (ref 22–44)
MCH RBC QN AUTO: 31 PG (ref 27–34.8)
MCHC RBC AUTO-ENTMCNC: 33.7 G/DL (ref 32.4–35.8)
MICROSCOPIC: (no result)
MONOCYTES # BLD AUTO: 0.7 X10^3/UL (ref 0.2–0.8)
MONOCYTES NFR BLD AUTO: 7 % (ref 2–9)
NEUTROPHILS # BLD AUTO: 7.5 X10^3/UL (ref 1.8–6.8)
NEUTROPHILS NFR BLD AUTO: 71 % (ref 42–75)
PLATELET # BLD AUTO: 342 X10^3/UL (ref 130–400)
PMV BLD AUTO: 7.4 FL (ref 7.4–10.4)
PROT SERPL-MCNC: 8.2 G/DL (ref 6.4–8.2)
RBC # BLD AUTO: 4.46 X10^6/UL (ref 3.82–5.3)
TROPONIN I SERPL-MCNC: < 0.015 NG/ML (ref 0–0.04)

## 2021-09-07 PROCEDURE — 87077 CULTURE AEROBIC IDENTIFY: CPT

## 2021-09-07 PROCEDURE — 87086 URINE CULTURE/COLONY COUNT: CPT

## 2021-09-07 PROCEDURE — 85025 COMPLETE CBC W/AUTO DIFF WBC: CPT

## 2021-09-07 PROCEDURE — 84484 ASSAY OF TROPONIN QUANT: CPT

## 2021-09-07 PROCEDURE — 36415 COLL VENOUS BLD VENIPUNCTURE: CPT

## 2021-09-07 PROCEDURE — 87186 SC STD MICRODIL/AGAR DIL: CPT

## 2021-09-07 PROCEDURE — 80053 COMPREHEN METABOLIC PANEL: CPT

## 2021-09-07 PROCEDURE — 81001 URINALYSIS AUTO W/SCOPE: CPT

## 2021-09-07 PROCEDURE — 93005 ELECTROCARDIOGRAM TRACING: CPT

## 2021-09-07 PROCEDURE — 71045 X-RAY EXAM CHEST 1 VIEW: CPT

## 2021-09-07 PROCEDURE — 99285 EMERGENCY DEPT VISIT HI MDM: CPT

## 2021-09-12 ENCOUNTER — HOSPITAL ENCOUNTER (EMERGENCY)
Dept: HOSPITAL 8 - ED | Age: 46
LOS: 1 days | Discharge: HOME | End: 2021-09-13
Payer: MEDICARE

## 2021-09-12 VITALS — HEIGHT: 69 IN | BODY MASS INDEX: 39.18 KG/M2 | WEIGHT: 264.55 LBS

## 2021-09-12 DIAGNOSIS — F41.1: Primary | ICD-10-CM

## 2021-09-12 DIAGNOSIS — I25.2: ICD-10-CM

## 2021-09-12 DIAGNOSIS — I10: ICD-10-CM

## 2021-09-12 DIAGNOSIS — E03.9: ICD-10-CM

## 2021-09-12 DIAGNOSIS — K21.9: ICD-10-CM

## 2021-09-12 DIAGNOSIS — R07.89: ICD-10-CM

## 2021-09-12 LAB
ALBUMIN SERPL-MCNC: 3.5 G/DL (ref 3.4–5)
ALP SERPL-CCNC: 74 U/L (ref 45–117)
ALT SERPL-CCNC: 20 U/L (ref 12–78)
ANION GAP SERPL CALC-SCNC: 4 MMOL/L (ref 5–15)
BASOPHILS # BLD AUTO: 0.1 X10^3/UL (ref 0–0.1)
BASOPHILS NFR BLD AUTO: 1 % (ref 0–1)
BILIRUB SERPL-MCNC: 0.2 MG/DL (ref 0.2–1)
CALCIUM SERPL-MCNC: 8.7 MG/DL (ref 8.5–10.1)
CHLORIDE SERPL-SCNC: 111 MMOL/L (ref 98–107)
CREAT SERPL-MCNC: 0.86 MG/DL (ref 0.55–1.02)
EOSINOPHIL # BLD AUTO: 0.3 X10^3/UL (ref 0–0.4)
EOSINOPHIL NFR BLD AUTO: 3 % (ref 1–7)
ERYTHROCYTE [DISTWIDTH] IN BLOOD BY AUTOMATED COUNT: 13.7 % (ref 9.6–15.2)
LYMPHOCYTES # BLD AUTO: 3.2 X10^3/UL (ref 1–3.4)
LYMPHOCYTES NFR BLD AUTO: 33 % (ref 22–44)
MCH RBC QN AUTO: 31.2 PG (ref 27–34.8)
MCHC RBC AUTO-ENTMCNC: 34.2 G/DL (ref 32.4–35.8)
MONOCYTES # BLD AUTO: 0.7 X10^3/UL (ref 0.2–0.8)
MONOCYTES NFR BLD AUTO: 7 % (ref 2–9)
NEUTROPHILS # BLD AUTO: 5.5 X10^3/UL (ref 1.8–6.8)
NEUTROPHILS NFR BLD AUTO: 57 % (ref 42–75)
PLATELET # BLD AUTO: 317 X10^3/UL (ref 130–400)
PMV BLD AUTO: 7.3 FL (ref 7.4–10.4)
PROT SERPL-MCNC: 7.5 G/DL (ref 6.4–8.2)
RBC # BLD AUTO: 4.06 X10^6/UL (ref 3.82–5.3)

## 2021-09-12 PROCEDURE — 82962 GLUCOSE BLOOD TEST: CPT

## 2021-09-12 PROCEDURE — 36415 COLL VENOUS BLD VENIPUNCTURE: CPT

## 2021-09-12 PROCEDURE — 99284 EMERGENCY DEPT VISIT MOD MDM: CPT

## 2021-09-12 PROCEDURE — 80053 COMPREHEN METABOLIC PANEL: CPT

## 2021-09-12 PROCEDURE — 93005 ELECTROCARDIOGRAM TRACING: CPT

## 2021-09-12 PROCEDURE — 85025 COMPLETE CBC W/AUTO DIFF WBC: CPT

## 2021-09-13 VITALS — SYSTOLIC BLOOD PRESSURE: 114 MMHG | DIASTOLIC BLOOD PRESSURE: 75 MMHG

## 2021-09-20 ENCOUNTER — HOSPITAL ENCOUNTER (EMERGENCY)
Facility: MEDICAL CENTER | Age: 46
End: 2021-09-20
Attending: EMERGENCY MEDICINE
Payer: MEDICARE

## 2021-09-20 VITALS
BODY MASS INDEX: 40.07 KG/M2 | RESPIRATION RATE: 16 BRPM | OXYGEN SATURATION: 98 % | TEMPERATURE: 96.7 F | SYSTOLIC BLOOD PRESSURE: 132 MMHG | HEART RATE: 61 BPM | WEIGHT: 270.5 LBS | DIASTOLIC BLOOD PRESSURE: 83 MMHG | HEIGHT: 69 IN

## 2021-09-20 DIAGNOSIS — M77.9 BONE SPUR: ICD-10-CM

## 2021-09-20 PROCEDURE — 99283 EMERGENCY DEPT VISIT LOW MDM: CPT

## 2021-09-20 PROCEDURE — 302874 HCHG BANDAGE ACE 2 OR 3""

## 2021-09-20 PROCEDURE — 302875 HCHG BANDAGE ACE 4 OR 6""

## 2021-09-20 RX ORDER — IBUPROFEN 800 MG/1
800 TABLET ORAL EVERY 8 HOURS PRN
Qty: 30 TABLET | Refills: 0 | Status: SHIPPED | OUTPATIENT
Start: 2021-09-20 | End: 2022-02-16

## 2021-09-20 ASSESSMENT — FIBROSIS 4 INDEX: FIB4 SCORE: 0.62

## 2021-09-21 ENCOUNTER — HOSPITAL ENCOUNTER (EMERGENCY)
Dept: HOSPITAL 8 - ED | Age: 46
Discharge: HOME | End: 2021-09-21
Payer: MEDICARE

## 2021-09-21 VITALS — HEIGHT: 69 IN | WEIGHT: 269.4 LBS | BODY MASS INDEX: 39.9 KG/M2

## 2021-09-21 VITALS — SYSTOLIC BLOOD PRESSURE: 138 MMHG | DIASTOLIC BLOOD PRESSURE: 71 MMHG
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
## 2021-09-21 DIAGNOSIS — I10: ICD-10-CM

## 2021-09-21 DIAGNOSIS — F17.210: ICD-10-CM
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
## 2021-09-21 DIAGNOSIS — K21.9: ICD-10-CM

## 2021-09-21 DIAGNOSIS — S06.0X0A: Primary | ICD-10-CM

## 2021-09-21 DIAGNOSIS — G44.319: ICD-10-CM

## 2021-09-21 DIAGNOSIS — E03.9: ICD-10-CM
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
## 2021-09-21 DIAGNOSIS — I25.2: ICD-10-CM

## 2021-09-21 DIAGNOSIS — R55: ICD-10-CM
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
## 2021-09-21 PROCEDURE — 99406 BEHAV CHNG SMOKING 3-10 MIN: CPT

## 2021-09-21 PROCEDURE — 99284 EMERGENCY DEPT VISIT MOD MDM: CPT

## 2021-09-21 PROCEDURE — 70450 CT HEAD/BRAIN W/O DYE: CPT

## 2021-09-21 NOTE — ED PROVIDER NOTES
ED Provider Note    CHIEF COMPLAINT  Chief Complaint   Patient presents with   • Hand Pain     left hand x2 days   • Foot Pain     left foot x2 days   • Breast Pain     bilateral breast pain x2 days       HPI  Frances Ardon is a 45 y.o. female here for evaluation of chronic left hand pain and chronic left foot pain.  Patient states that she has a bone spur in the left foot, and pain in the left hand because of overuse.  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  She states that this is worse when she moves things, and better when she remains still.  She has not taken any thing for the pain prior.  She has no paresthesias or weakness, no shortness of breath, and no diaphoresis.    ROS;  Please see HPI  O/W negative     PAST MEDICAL HISTORY   has a past medical history of Arthritis, Asthma, Dyslipidemia (12/27/2016), GERD (gastroesophageal reflux disease) (12/27/2016), History of seizures (12/27/2016), Hypertension, Hypothyroidism (12/27/2016), Mental retardation, mild (I.Q. 50-70) (12/27/2016), and Tobacco dependence (12/27/2016).    SOCIAL HISTORY  Social History     Tobacco Use   • Smoking status: Current Every Day Smoker     Packs/day: 1.00     Years: 12.00     Pack years: 12.00     Types: Cigarettes   • Smokeless tobacco: Never Used   Vaping Use   • Vaping Use: Never used   Substance and Sexual Activity   • Alcohol use: Yes     Alcohol/week: 0.0 oz     Comment: Occasionally   • Drug use: No   • Sexual activity: Not Currently     Partners: Male       SURGICAL HISTORY  patient denies any surgical history    CURRENT MEDICATIONS  Home Medications     Reviewed by Jac Coffey R.N. (Registered Nurse) on 09/20/21 at 2028  Med List Status: Not Addressed   Medication Last Dose Status   acetaminophen (TYLENOL) 500 MG Tab  Active   Diclofenac Sodium 1 % Gel  Active   Disposable Gloves (LATEX GLOVES LARGE) Misc  Active   ibuprofen (MOTRIN)  "800 MG Tab  Active   levothyroxine (SYNTHROID) 50 MCG Tab  Active   loratadine (CLARITIN) 10 MG Tab  Active   meloxicam (MOBIC) 15 MG tablet  Active   naproxen (NAPROSYN) 500 MG Tab  Active   nicotine polacrilex (NICORETTE) 4 MG gum  Active   olopatadine (PATANOL) 0.1 % ophthalmic solution  Active   olopatadine (PATANOL) 0.1 % ophthalmic solution  Active   Omega-3 Fatty Acids (FISH OIL) 500 MG Cap  Active   omeprazole (PRILOSEC) 20 MG delayed-release capsule  Active   paliperidone (INVEGA) 3 MG ER tablet  Active   polyethylene glycol 3350 (MIRALAX) Powder  Active                ALLERGIES  No Known Allergies    REVIEW OF SYSTEMS  See HPI for further details. Review of systems as above, otherwise all other systems are negative.     PHYSICAL EXAM  VITAL SIGNS: /73   Pulse (!) 59   Temp 36.4 °C (97.5 °F) (Temporal)   Resp 16   Ht 1.753 m (5' 9\")   Wt 123 kg (270 lb 8.1 oz)   LMP 08/23/2021   SpO2 97% Comment: RA  BMI 39.95 kg/m²     Constitutional: Well developed, well nourished. No acute distress.  HEENT: Normocephalic, atraumatic. MMM  Neck: Supple, Full range of motion   Chest/Pulmonary:  No respiratory distress.  Equal expansion, reproducible chest wall tenderness palpation consistent with costochondritis.  Musculoskeletal: No deformity, no edema, neurovascular intact.  Left upper extremity nontender to palpation, good range of motion of the hand.  No edema, neurovascular distally.  Left lower extremity with calcaneal tenderness to palpation, no edema, no erythema.  Neuro: Clear speech, appropriate, cooperative, cranial nerves II-XII grossly intact.  Psych: Normal mood and affect      PROCEDURES     MEDICAL RECORD  I have reviewed patient's medical record and pertinent results are listed.    COURSE & MEDICAL DECISION MAKING  I have reviewed any medical record information, laboratory studies and radiographic results as noted above.    The patient is nontoxic-appearing afebrile and comfortable.  At this " time she has no other medical concerns.  I prescribed anti-inflammatories for her, and she will follow-up or return if any further issues or concerns.  [facility] [place] [facility]    I you have had any blood pressure issues while here in the emergency department, please see your doctor for a further evaluation or work up.    Differential diagnoses include but not limited to: Bone spur costochondritis, fracture, strain    This patient presents with bone spur.  At this time, I have counseled the patient/family regarding their medications, pain control, and follow up.  They will continue their medications, if any, as prescribed.  They will return immediately for any worsening symptoms and/or any other medical concerns.  They will see their doctor, or contact the doctor provided, in 1-2 days for follow up.       FINAL IMPRESSION  1. Bone spur             Electronically signed by: Wyatt Dao D.O., 9/20/2021 11:27 PM

## 2021-09-21 NOTE — ED NOTES
Discharge instructions provided with prescription teaching, pt verbalizes understanding. Pt is awake, alert, VSS. CMS intact to left hand/ left foot. Pt ambulatory with steady gait out of ER with boyfriend.

## 2021-09-21 NOTE — NUR
FIRST CONTACT: PATIENT REPORTS FALLING YESTERDAY AND "WAS OUT FOR 20 MINUTES", 
DENIES BEING SEEN AFTER THE FALL AND LOC. PATIENT CURRENTLY ENDORCING HEADACHE 
FROM HITTING HER HEAD YESTERDAY

## 2021-09-21 NOTE — ED TRIAGE NOTES
Chief Complaint   Patient presents with   • Hand Pain     left hand x2 days   • Foot Pain     left foot x2 days   • Breast Pain     bilateral breast pain x2 days     Pt ambulatory to triage with above complaints accompanied by boyfriend. Pt reports pain suddenly started two days ago. Denies trauma. Denies other symptoms. Pt reports history of bone spur on left foot.

## 2021-09-21 NOTE — ED NOTES
Patient vital signs rechecked and documented per [facility]. Patient denies any new needs at this time.  Patient updated on wait times, thanked for patience. Pt informed to alert triage tech or triage RN with any needs and/or changes in condition; patient verbalized understanding.

## 2021-09-21 NOTE — ED NOTES
Pt ambulatory to room 67 with Ed tech and boyfriend. Agree with triage note. No visible injuries. ERP to bedside.

## 2021-09-23 ENCOUNTER — HOSPITAL ENCOUNTER (EMERGENCY)
Facility: MEDICAL CENTER | Age: 46
End: 2021-09-23
Attending: EMERGENCY MEDICINE | Admitting: EMERGENCY MEDICINE
Payer: MEDICARE

## 2021-09-23 VITALS
HEART RATE: 61 BPM | RESPIRATION RATE: 17 BRPM | DIASTOLIC BLOOD PRESSURE: 83 MMHG | HEIGHT: 69 IN | WEIGHT: 270.28 LBS | BODY MASS INDEX: 40.03 KG/M2 | TEMPERATURE: 97.1 F | OXYGEN SATURATION: 95 % | SYSTOLIC BLOOD PRESSURE: 141 MMHG

## 2021-09-23 DIAGNOSIS — F43.0 STRESS REACTION: ICD-10-CM

## 2021-09-23 DIAGNOSIS — F41.9 ANXIETY: ICD-10-CM

## 2021-09-23 PROCEDURE — 99283 EMERGENCY DEPT VISIT LOW MDM: CPT

## 2021-09-23 ASSESSMENT — ENCOUNTER SYMPTOMS
COUGH: 0
NERVOUS/ANXIOUS: 1
FEVER: 0

## 2021-09-23 ASSESSMENT — FIBROSIS 4 INDEX: FIB4 SCORE: 0.62
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
## 2021-09-23 NOTE — ED PROVIDER NOTES
"ED Provider Note    ED Provider Note    Primary care provider: TWILA Escoto  Means of arrival: EMS  History obtained from: Patient  History limited by: None    CHIEF COMPLAINT  Chief Complaint   Patient presents with   • Anxiety     brought in by shonda to triage for possible anxiety. patient had a fight with her boyfriend ( verbal ) and stated that her boyfriend yelled at her. scared to come back to her hotel room.        HPI  Frances Ardon is a 45 y.o. female who presents to the Emergency Department via EMS with a chief complaint of feeling anxious with rapid breathing.  Patient states she got in a verbal argument with her boyfriend.  He told her not to come back to the apartment.  At his apartment but she stays there.  She also states that she is afraid if she goes back she will have a seizure.  She does have a history of \"grand mal seizures\" however, she does not take any medication for this.  Her greatest concern seems to be that it is a long walk back to his apartment.  She is otherwise been in her normal state of health.  She works typically during the day but states she took the day off.  She has a previous injury to her left hand which is wrapped in an Ace wrap but states that it is \"okay\".    REVIEW OF SYSTEMS  Review of Systems   Constitutional: Negative for fever.   Respiratory: Negative for cough.    Psychiatric/Behavioral: The patient is nervous/anxious.        PAST MEDICAL HISTORY   has a past medical history of Arthritis, Asthma, Dyslipidemia (12/27/2016), GERD (gastroesophageal reflux disease) (12/27/2016), History of seizures (12/27/2016), Hypertension, Hypothyroidism (12/27/2016), Mental retardation, mild (I.Q. 50-70) (12/27/2016), and Tobacco dependence (12/27/2016).    SURGICAL HISTORY  patient denies any surgical history    SOCIAL HISTORY  Social History     Tobacco Use   • Smoking status: Current Every Day Smoker     Packs/day: 1.00     Years: 12.00     Pack years: 12.00     " "Types: Cigarettes   • Smokeless tobacco: Never Used   Vaping Use   • Vaping Use: Never used   Substance Use Topics   • Alcohol use: Yes     Alcohol/week: 0.0 oz     Comment: Occasionally   • Drug use: No      Social History     Substance and Sexual Activity   Drug Use No       FAMILY HISTORY  Family History   Problem Relation Age of Onset   • Cancer Neg Hx        CURRENT MEDICATIONS  Home Medications     Reviewed by David Pena R.N. (Registered Nurse) on 09/23/21 at 0528  Med List Status: Partial   Medication Last Dose Status   acetaminophen (TYLENOL) 500 MG Tab  Active   Diclofenac Sodium 1 % Gel  Active   Disposable Gloves (LATEX GLOVES LARGE) Misc  Active   ibuprofen (MOTRIN) 800 MG Tab  Active   ibuprofen (MOTRIN) 800 MG Tab  Active   levothyroxine (SYNTHROID) 50 MCG Tab  Active   loratadine (CLARITIN) 10 MG Tab  Active   meloxicam (MOBIC) 15 MG tablet  Active   naproxen (NAPROSYN) 500 MG Tab  Active   nicotine polacrilex (NICORETTE) 4 MG gum  Active   olopatadine (PATANOL) 0.1 % ophthalmic solution  Active   olopatadine (PATANOL) 0.1 % ophthalmic solution  Active   Omega-3 Fatty Acids (FISH OIL) 500 MG Cap  Active   omeprazole (PRILOSEC) 20 MG delayed-release capsule  Active   paliperidone (INVEGA) 3 MG ER tablet  Active   polyethylene glycol 3350 (MIRALAX) Powder  Active                ALLERGIES  No Known Allergies    PHYSICAL EXAM  VITAL SIGNS: /83   Pulse 61   Temp 36.2 °C (97.1 °F) (Temporal)   Resp 17   Ht 1.753 m (5' 9\")   Wt 123 kg (270 lb 4.5 oz)   LMP 09/19/2021   SpO2 95%   BMI 39.91 kg/m²   Vitals reviewed.  Constitutional: Patient is oriented to person, place, and time. Appears well-developed and well-nourished.  Mild distress.    Head: Normocephalic and atraumatic.   Mouth/Throat: Oropharynx is clear and moist.  Eyes: Conjunctivae are normal. Pupils are equal and round.  Neck: Normal range of motion.  Cardiovascular: Normal rate, regular rhythm and normal heart " sounds.  Pulmonary/Chest: Effort normal and breath sounds normal. No respiratory distress, no wheezes, rhonchi, or rales.   Abdominal: Soft. Bowel sounds are normal.  Musculoskeletal: No edema   Neurological: No focal deficits.   Skin: Skin is warm and dry. No erythema. No pallor.   Psychiatric: Anxious.     COURSE & MEDICAL DECISION MAKING  Pertinent Labs & Imaging studies reviewed. (See chart for details)    Obtained and reviewed past medical records.  Patient's last encounter was in the emergency department just 3 days ago for hand pain, foot pain and breast pain.  She was diagnosed with a bone spur.  Prior to that patient was seen in the emergency department August 29 with leg swelling and shoulder pain.  ED visit in July with foot pain.  ED visit June of this year with headache and foot pain.  ED visit July of last year for cough, chest pain and foot pain.    6:44 AM - Patient seen and examined at bedside.  This is a pleasant 45-year-old female.  She struggles with anxiety and panic attacks.  She has pain.  She had a verbal argument with her partner and he had told her to leave their apartment.  Now she is afraid both of going back as well as how she will get back there she states is too far to walk.  Will contact  for assistance.     8:40 AM patient's reevaluated the bedside.  She is feeling better.  We have arranged for her a ride back to her residence.  She is agreeable to this plan of care.  She is discharged in stable condition.    FINAL IMPRESSION  1. Anxiety    2. Stress reaction

## 2021-09-23 NOTE — ED NOTES
Steady gait out of ER with all belongings. Understanding of DC paperwork. [employer] transportation called.

## 2021-09-30 ENCOUNTER — HOSPITAL ENCOUNTER (EMERGENCY)
Facility: MEDICAL CENTER | Age: 46
End: 2021-09-30
Attending: EMERGENCY MEDICINE
Payer: MEDICARE

## 2021-09-30 ENCOUNTER — APPOINTMENT (OUTPATIENT)
Dept: RADIOLOGY | Facility: MEDICAL CENTER | Age: 46
End: 2021-09-30
Attending: EMERGENCY MEDICINE
Payer: MEDICARE

## 2021-09-30 VITALS
SYSTOLIC BLOOD PRESSURE: 130 MMHG | HEART RATE: 67 BPM | OXYGEN SATURATION: 98 % | TEMPERATURE: 97.7 F | DIASTOLIC BLOOD PRESSURE: 76 MMHG | WEIGHT: 250 LBS | BODY MASS INDEX: 37.03 KG/M2 | RESPIRATION RATE: 15 BRPM | HEIGHT: 69 IN

## 2021-09-30 DIAGNOSIS — M79.642 LEFT HAND PAIN: ICD-10-CM

## 2021-09-30 DIAGNOSIS — M54.50 ACUTE LEFT-SIDED LOW BACK PAIN WITHOUT SCIATICA: ICD-10-CM

## 2021-09-30 DIAGNOSIS — M79.672 LEFT FOOT PAIN: ICD-10-CM

## 2021-09-30 PROCEDURE — 99283 EMERGENCY DEPT VISIT LOW MDM: CPT

## 2021-09-30 PROCEDURE — 73630 X-RAY EXAM OF FOOT: CPT | Mod: LT

## 2021-09-30 PROCEDURE — 73130 X-RAY EXAM OF HAND: CPT | Mod: LT

## 2021-09-30 ASSESSMENT — FIBROSIS 4 INDEX: FIB4 SCORE: 0.62

## 2021-10-01 NOTE — ED NOTES
"PT attempted urine sample x2. Pt states \"I can't pee. It stings when I pee. I can't pee.\" Pt ambulated back to room.  "

## 2021-10-01 NOTE — ED PROVIDER NOTES
"ED Provider Note    CHIEF COMPLAINT  Chief Complaint   Patient presents with   • Low Back Pain     since this morning at 0800   • Foot Pain     left foot since this morning    • Hand Pain     left hand \"off and on if I move it\".          HPI    Primary care provider: Jac Fuentes, A.P.R.N.   History obtained from: Patient  History limited by: None     Frances Ardon is a 45 y.o. female who presents to the ED by EMS complaining of left sided mid to lower back pain since about 8:00 this morning without any known injury/trauma or particular inciting event.  Patient has had mid left foot pain and pain over the fourth metacarpal of left hand that she states has been on and off for \"quite a while\" but unable to tell me how long and states that the pain is worse with palpation, movement or in the case of her foot with walking.  She reports that she felt warm but unsure if she had a fever.  She denies shortness of breath/difficulty breathing/cough/chest pain/abdominal pain/nausea/vomiting/dysuria/hematuria.  She reports that she does have constipation frequently and with straining to have bowel movements.  She denies possibility of pregnancy.  She has not noticed any rash.    REVIEW OF SYSTEMS  Please see HPI for pertinent positives/negatives.  All other systems reviewed and are negative.     PAST MEDICAL HISTORY  Past Medical History:   Diagnosis Date   • Tobacco dependence 12/27/2016   • Hypothyroidism 12/27/2016   • GERD (gastroesophageal reflux disease) 12/27/2016   • Mental retardation, mild (I.Q. 50-70) 12/27/2016   • Dyslipidemia 12/27/2016   • History of seizures 12/27/2016   • Arthritis    • Asthma    • Hypertension         SURGICAL HISTORY  History reviewed. No pertinent surgical history.     SOCIAL HISTORY  Social History     Tobacco Use   • Smoking status: Current Every Day Smoker     Packs/day: 1.00     Years: 12.00     Pack years: 12.00     Types: Cigarettes   • Smokeless tobacco: Never Used   Vaping " "Use   • Vaping Use: Never used   Substance and Sexual Activity   • Alcohol use: Yes     Alcohol/week: 0.0 oz     Comment: Occasionally   • Drug use: No   • Sexual activity: Not Currently     Partners: Male        FAMILY HISTORY  Family History   Problem Relation Age of Onset   • Cancer Neg Hx         CURRENT MEDICATIONS  Home Medications     Reviewed by Fabienne Villarreal R.N. (Registered Nurse) on 09/30/21 at 1730  Med List Status: Partial   Medication Last Dose Status   acetaminophen (TYLENOL) 500 MG Tab  Active   Diclofenac Sodium 1 % Gel  Active   Disposable Gloves (LATEX GLOVES LARGE) Misc  Active   ibuprofen (MOTRIN) 800 MG Tab  Active   ibuprofen (MOTRIN) 800 MG Tab  Active   levothyroxine (SYNTHROID) 50 MCG Tab  Active   loratadine (CLARITIN) 10 MG Tab  Active   meloxicam (MOBIC) 15 MG tablet  Active   naproxen (NAPROSYN) 500 MG Tab  Active   nicotine polacrilex (NICORETTE) 4 MG gum  Active   olopatadine (PATANOL) 0.1 % ophthalmic solution  Active   olopatadine (PATANOL) 0.1 % ophthalmic solution  Active   Omega-3 Fatty Acids (FISH OIL) 500 MG Cap  Active   omeprazole (PRILOSEC) 20 MG delayed-release capsule  Active   paliperidone (INVEGA) 3 MG ER tablet  Active   polyethylene glycol 3350 (MIRALAX) Powder  Active                 ALLERGIES  No Known Allergies     PHYSICAL EXAM  VITAL SIGNS: /76   Pulse 67   Temp 36.5 °C (97.7 °F) (Temporal)   Resp 15   Ht 1.753 m (5' 9\")   Wt 113 kg (250 lb)   LMP 09/19/2021   SpO2 98%   BMI 36.92 kg/m²  @MARTHA[[id number]::@     Pulse ox interpretation: 97% I interpret this pulse ox as normal     Constitutional: Well developed, well nourished, alert in no apparent distress, nontoxic appearance    HENT: No external signs of trauma, normocephalic, mask on due to COVID-19 pandemic  Eyes: PERRL, conjunctiva without erythema, no discharge, no icterus    Neck: Soft and supple, trachea midline, no stridor, no tenderness, no LAD, no JVD, good ROM    Cardiovascular: Regular rate and " rhythm, no murmurs/rubs/gallops, strong distal pulses and good perfusion    Thorax & Lungs: No respiratory distress, CTAB   Abdomen: Soft, nontender, nondistended, no guarding, no rebound, normal BS    Back: Mild left lumbar tenderness to palpation, no midline tenderness to palpation, patient moving her back without apparent discomfort, straight leg raising negative bilaterally, sensation intact to touch throughout, 5/5 strength bilateral lower extremities, DTRs 0/4 bilateral lower extremities  Extremities: No cyanosis, no edema, no gross deformity, good ROM, mild tenderness along the fourth metacarpal of the left hand and mild tenderness across the left midfoot without gross deformity/swelling/warmth/crepitus/fluctuance/rash, intact distal pulses with brisk cap refill    Skin: Warm, dry, no pallor/cyanosis, no rash noted    Lymphatic: No lymphadenopathy noted    Neuro: A/O times 3, no focal deficits noted, ambulating without difficulty  Psychiatric: Cooperative      DIAGNOSTIC STUDIES / PROCEDURES        LABS  All labs reviewed by me.     Results for orders placed or performed during the hospital encounter of 21   EKG   Result Value Ref Range    Report       [facility] Emergency Dept.    Test Date:  2021  Pt Name:    MORALES GOODEN             Department: ER  MRN:        [id number]                      Room:  Gender:     Female                       Technician: [id number]  :        [id number]                   Requested By:ER TRIAGE PROTOCOL  Order #:    [id number]                    Reading MD:    Measurements  Intervals                                Axis  Rate:       73                           P:          37  NH:         157                          QRS:        25  QRSD:       107                          T:          25  QT:         370  QTc:        408    Interpretive Statements  Sinus rhythm  Compared to ECG 2020 19:29:57  No significant changes          RADIOLOGY  The  radiologist's interpretation of all radiological studies have been reviewed by me.     DX-HAND 3+ LEFT   Final Result      No evidence of acute fracture or dislocation.            DX-FOOT-COMPLETE 3+ LEFT   Final Result      1.  There is no acute bony process.   2.  There is multifocal degenerative change throughout the left foot.   3.  There are hammertoe deformities of the 2nd through 5th digits with mild hallux valgus deformity of the 1st MTP joint.             COURSE & MEDICAL DECISION MAKING  Nursing notes, VS, PMSFHx reviewed in chart.     Review of past medical records shows the patient has had multiple visits to this ED with her last visit on September 23, 2021 for feeling anxious.      Differential diagnoses considered include but are not limited to: Strain/sprain, KS/renal colic, UTI/pyelo, arthritis, bursitis, tendonitis, fracture, contusion      History and physical exam as above.  X-rays of the left foot and left hand without evidence for acute abnormality as above.  Patient was unable to give an urine specimen despite drinking water in the ED.  I discussed the findings with the patient and she is noted to be in no acute distress and nontoxic in appearance.  She does not want to wait in the ED to give an urine sample and states that she will follow-up with her doctor.  I do not suspect acute serious pathology at this time.  No worrisome findings or red flags to suggest emergent pathology such as acute cord syndrome/cauda equina, dissection/AAA, hematoma/abscess, discitis/osteomyelitis.  Patient's left hand and left foot symptoms appear to be chronic and recurrent.  I do not find any evidence for compartment syndrome, significant neurovascular compromise or infectious process.  She was advised on outpatient follow-up and given return to ED precautions.  Patient also noted to have elevated blood pressure readings for which she can follow-up on outpatient basis for further management.  Patient verbalized  understanding and agreed with plan of care with no further questions or concerns.      The patient is referred to a primary physician for blood pressure management, diabetic screening, and for all other preventative health concerns.       FINAL IMPRESSION  1. Acute left-sided low back pain without sciatica Acute   2. Left hand pain Active   3. Left foot pain Active          DISPOSITION  Patient will be discharged home in stable condition.       FOLLOW UP  Jac Fuentes, A.P.R.N.  [address]  [place] [id number] [phone]    Call in 1 day      [facility], Emergency Dept  [address] [address] [phone]    If symptoms worsen         OUTPATIENT MEDICATIONS  Discharge Medication List as of 9/30/2021  8:59 PM             Electronically signed by: Bernard Ibarra D.O., 9/30/2021 7:06 PM      Portions of this record were made with voice recognition software.  Despite my review, spelling/grammar/context errors may still remain.  Interpretation of this chart should be taken in this context.

## 2021-10-01 NOTE — ED NOTES
"Pt discharged to lobby via ambulatory with a steady gait. AOX4. Pt in possession of belongings. Pt provided discharge education and information pertaining to medications and follow up appointments. Pt received copy of discharge instructions and verbalized understanding.     /76   Pulse 67   Temp 36.5 °C (97.7 °F) (Temporal)   Resp 15   Ht 1.753 m (5' 9\")   Wt 113 kg (250 lb)   LMP 09/19/2021   SpO2 98%   BMI 36.92 kg/m²   "

## 2021-10-01 NOTE — ED TRIAGE NOTES
"Chief Complaint   Patient presents with   • Low Back Pain     since this morning at 0800   • Foot Pain     left foot since this morning    • Hand Pain     left hand \"off and on if I move it\".       Pt to [facility] with above complaint.  Pt called 911 with above pains.  Pt denies trauma/fall of any kind.      /86   Pulse 80   Temp 36.1 °C (96.9 °F) (Temporal)   Resp 14   Ht 1.753 m (5' 9\")   Wt 113 kg (250 lb)   LMP 09/19/2021   SpO2 97%   BMI 36.92 kg/m²     "

## 2021-10-10 ENCOUNTER — HOSPITAL ENCOUNTER (EMERGENCY)
Facility: MEDICAL CENTER | Age: 46
End: 2021-10-10
Attending: EMERGENCY MEDICINE
Payer: MEDICARE

## 2021-10-10 ENCOUNTER — APPOINTMENT (OUTPATIENT)
Dept: RADIOLOGY | Facility: MEDICAL CENTER | Age: 46
End: 2021-10-10
Attending: EMERGENCY MEDICINE
Payer: MEDICARE

## 2021-10-10 VITALS
OXYGEN SATURATION: 98 % | RESPIRATION RATE: 18 BRPM | HEIGHT: 69 IN | HEART RATE: 69 BPM | WEIGHT: 275 LBS | SYSTOLIC BLOOD PRESSURE: 134 MMHG | DIASTOLIC BLOOD PRESSURE: 80 MMHG | TEMPERATURE: 97.5 F | BODY MASS INDEX: 40.73 KG/M2

## 2021-10-10 DIAGNOSIS — S93.602A FOOT SPRAIN, LEFT, INITIAL ENCOUNTER: ICD-10-CM
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
## 2021-10-10 PROCEDURE — 99283 EMERGENCY DEPT VISIT LOW MDM: CPT

## 2021-10-10 PROCEDURE — 73630 X-RAY EXAM OF FOOT: CPT | Mod: LT

## 2021-10-10 ASSESSMENT — FIBROSIS 4 INDEX: FIB4 SCORE: 0.62

## 2021-10-10 NOTE — ED TRIAGE NOTES
Chief Complaint   Patient presents with   • Fall     tripped over rocks, hit back of head, no obvious trauma noted to area. Possible +LOC, difficult to obtain info from patient. no blood thinners,

## 2021-10-11 NOTE — ED PROVIDER NOTES
"ED Provider Note    CHIEF COMPLAINT  Chief Complaint   Patient presents with   • Fall     tripped over rocks, hit back of head, no obvious trauma noted to area. Possible +LOC, difficult to obtain info from patient. no blood thinners,        HPI  Frances Ardon is a 45 y.o. female who presents for evaluation of a fall.  The patient has past history of mental disability.  Patient has been seen multiple times in the past secondary to different issues.  She was brought in by the owner of the home which she lives.  The patient states that she fell this afternoon hitting her head.  She denies loss of consciousness and denies a headache.  She actually had no complaints but then on review of systems she says she was having some left foot pain.  Currently the patient denies: Head pain, headache, visual changes, neck pain, back pain, rib pain, difficulty breathing, abdominal pain, extremity pain other than some left foot pain.  She has had no recent fever, chills, URI symptoms, cardiorespiratory symptoms, gastrointestinal symptoms.  No other complaints.    REVIEW OF SYSTEMS  See HPI for further details. Review of systems otherwise negative.     PAST MEDICAL HISTORY  Past Medical History:   Diagnosis Date   • Arthritis    • Asthma    • Dyslipidemia 12/27/2016   • GERD (gastroesophageal reflux disease) 12/27/2016   • History of seizures 12/27/2016   • Hypertension    • Hypothyroidism 12/27/2016   • Mental retardation, mild (I.Q. 50-70) 12/27/2016   • Tobacco dependence 12/27/2016       FAMILY HISTORY  Family History   Problem Relation Age of Onset   • Cancer Neg Hx        SOCIAL HISTORY  Resides locally;    SURGICAL HISTORY  No past surgical history on file.    CURRENT MEDICATIONS  See nurses notes    ALLERGIES  No Known Allergies    PHYSICAL EXAM  VITAL SIGNS: /80   Pulse 69   Temp 36.4 °C (97.5 °F) (Oral)   Resp 18   Ht 1.753 m (5' 9\") Comment: Simultaneous filing. User may not have seen previous data.  Wt " 125 kg (275 lb)   LMP 09/19/2021   SpO2 98%   BMI 40.61 kg/m²    Constitutional: 45-year-old female, mildly developmentally delayed, oriented x3  HENT: Calvarium: atraumatic, No Leyva's sign, No racoon sign, No hemotypanum, No midface trauma, No intraoral trauma, No malocclusion;  Eyes: PERRL, EOMI,   Neck: No tenderness over the spinous processes, no step-offs; Trachea: midline; No JVD;   Cardiovascular: Normal heart rate, Normal rhythm, No murmurs, No rubs, No gallops.   Thorax & Lungs: Non tender to palpation, No palpable deformities or palpable rib fractures, No subcutaneous emphysema;Equal breath sounds, Lungs are clear to auscultation,No respiratory distress.   Abdomen: Soft, Nontender without guarding, rebound or rigidity; No left or right upper quadrant tenderness; Bowel sounds normal in quality;  Skin: Warm, Dry, No erythema, No rash.   Back: No palpable deformities; No localized tenderness over the spinous processes of the thoracic/lumbar spine;  Extremities: Intact distal pulses, No edema, No tenderness, No cyanosis, No clubbing.   Musculoskeletal: The upper extremities and right lower extremity atraumatic with full range of motion without discomfort; left lower extremity feels no tenderness to hip, thigh, knee, leg, ankle; left foot reveals some tenderness in the dorsal and plantar aspect of the midfoot with slight swelling but no deformity identified; motor, sensory, vascular intact in the extremity in all extremities; all compartments are soft with no evidence of compartment syndrome;  Neurologic: Alert & oriented x 3, [place] Coma Score: 15; Normal motor function, Normal sensory function, No focal deficits noted.     RADIOLOGY/PROCEDURES  DX-FOOT-COMPLETE 3+ LEFT   Final Result      1.  Mild multifocal osteoarthrosis. No fracture or dislocation.   2.  Nonspecific forefoot swelling.            COURSE & MEDICAL DECISION MAKING  Pertinent Labs & Imaging studies reviewed. (See chart for  details)  Discussion: At this time, the patient presents for evaluation of fall.  Her only clinical finding was some left foot tenderness.  Imaging studies were negative for fracture.  This is consistent with mild foot sprain or contusion.  At this time, I see no indication for laboratory or further imaging studies.  I discussed the findings and treatment plan with the patient and the caretaker.  They indicate that they are comfortable with this explanation disposition.    FINAL IMPRESSION  1. Fall, initial encounter    2. Foot sprain, left, initial encounter        PLAN  1.  Appropriate discharge instructions given  2.  Follow-up with primary care    Electronically signed by: Guy G Gansert, M.D., 10/10/2021

## 2021-10-21 ENCOUNTER — HOSPITAL ENCOUNTER (EMERGENCY)
Facility: MEDICAL CENTER | Age: 46
End: 2021-10-22
Attending: STUDENT IN AN ORGANIZED HEALTH CARE EDUCATION/TRAINING PROGRAM
Payer: MEDICARE

## 2021-10-21 DIAGNOSIS — R42 DIZZINESS: ICD-10-CM

## 2021-10-21 LAB
ALBUMIN SERPL BCP-MCNC: 4.4 G/DL (ref 3.2–4.9)
ALBUMIN/GLOB SERPL: 1.6 G/DL
ALP SERPL-CCNC: 76 U/L (ref 30–99)
ALT SERPL-CCNC: 16 U/L (ref 2–50)
ANION GAP SERPL CALC-SCNC: 11 MMOL/L (ref 7–16)
AST SERPL-CCNC: 16 U/L (ref 12–45)
BASOPHILS # BLD AUTO: 1.2 % (ref 0–1.8)
BASOPHILS # BLD: 0.11 K/UL (ref 0–0.12)
BILIRUB SERPL-MCNC: 0.2 MG/DL (ref 0.1–1.5)
BUN SERPL-MCNC: 15 MG/DL (ref 8–22)
CALCIUM SERPL-MCNC: 9.5 MG/DL (ref 8.5–10.5)
CHLORIDE SERPL-SCNC: 103 MMOL/L (ref 96–112)
CO2 SERPL-SCNC: 24 MMOL/L (ref 20–33)
CREAT SERPL-MCNC: 0.87 MG/DL (ref 0.5–1.4)
EOSINOPHIL # BLD AUTO: 0.47 K/UL (ref 0–0.51)
EOSINOPHIL NFR BLD: 5 % (ref 0–6.9)
ERYTHROCYTE [DISTWIDTH] IN BLOOD BY AUTOMATED COUNT: 46.4 FL (ref 35.9–50)
GLOBULIN SER CALC-MCNC: 2.8 G/DL (ref 1.9–3.5)
GLUCOSE SERPL-MCNC: 100 MG/DL (ref 65–99)
HCG SERPL QL: NEGATIVE
HCT VFR BLD AUTO: 36.7 % (ref 37–47)
HGB BLD-MCNC: 12.3 G/DL (ref 12–16)
IMM GRANULOCYTES # BLD AUTO: 0.04 K/UL (ref 0–0.11)
IMM GRANULOCYTES NFR BLD AUTO: 0.4 % (ref 0–0.9)
LIPASE SERPL-CCNC: 25 U/L (ref 11–82)
LYMPHOCYTES # BLD AUTO: 3.26 K/UL (ref 1–4.8)
LYMPHOCYTES NFR BLD: 34.9 % (ref 22–41)
MCH RBC QN AUTO: 31.1 PG (ref 27–33)
MCHC RBC AUTO-ENTMCNC: 33.5 G/DL (ref 33.6–35)
MCV RBC AUTO: 92.9 FL (ref 81.4–97.8)
MONOCYTES # BLD AUTO: 0.78 K/UL (ref 0–0.85)
MONOCYTES NFR BLD AUTO: 8.4 % (ref 0–13.4)
NEUTROPHILS # BLD AUTO: 4.68 K/UL (ref 2–7.15)
NEUTROPHILS NFR BLD: 50.1 % (ref 44–72)
NRBC # BLD AUTO: 0 K/UL
NRBC BLD-RTO: 0 /100 WBC
PLATELET # BLD AUTO: 303 K/UL (ref 164–446)
PMV BLD AUTO: 9 FL (ref 9–12.9)
POTASSIUM SERPL-SCNC: 4.2 MMOL/L (ref 3.6–5.5)
PROT SERPL-MCNC: 7.2 G/DL (ref 6–8.2)
RBC # BLD AUTO: 3.95 M/UL (ref 4.2–5.4)
SODIUM SERPL-SCNC: 138 MMOL/L (ref 135–145)
WBC # BLD AUTO: 9.3 K/UL (ref 4.8–10.8)

## 2021-10-21 PROCEDURE — 83690 ASSAY OF LIPASE: CPT

## 2021-10-21 PROCEDURE — 99284 EMERGENCY DEPT VISIT MOD MDM: CPT

## 2021-10-21 PROCEDURE — 80053 COMPREHEN METABOLIC PANEL: CPT

## 2021-10-21 PROCEDURE — 85025 COMPLETE CBC W/AUTO DIFF WBC: CPT

## 2021-10-21 PROCEDURE — 84703 CHORIONIC GONADOTROPIN ASSAY: CPT

## 2021-10-21 ASSESSMENT — FIBROSIS 4 INDEX: FIB4 SCORE: 0.62

## 2021-10-22 VITALS
WEIGHT: 275.35 LBS | TEMPERATURE: 98.2 F | HEIGHT: 69 IN | DIASTOLIC BLOOD PRESSURE: 80 MMHG | SYSTOLIC BLOOD PRESSURE: 132 MMHG | OXYGEN SATURATION: 97 % | RESPIRATION RATE: 19 BRPM | BODY MASS INDEX: 40.78 KG/M2 | HEART RATE: 68 BPM

## 2021-10-22 LAB
APPEARANCE UR: CLEAR
BACTERIA #/AREA URNS HPF: NEGATIVE /HPF
BILIRUB UR QL STRIP.AUTO: NEGATIVE
COLOR UR: YELLOW
EKG IMPRESSION: NORMAL
EPI CELLS #/AREA URNS HPF: ABNORMAL /HPF
GLUCOSE UR STRIP.AUTO-MCNC: NEGATIVE MG/DL
HYALINE CASTS #/AREA URNS LPF: ABNORMAL /LPF
KETONES UR STRIP.AUTO-MCNC: NEGATIVE MG/DL
LEUKOCYTE ESTERASE UR QL STRIP.AUTO: ABNORMAL
MICRO URNS: ABNORMAL
NITRITE UR QL STRIP.AUTO: NEGATIVE
PH UR STRIP.AUTO: 5.5 [PH] (ref 5–8)
PROT UR QL STRIP: NEGATIVE MG/DL
RBC # URNS HPF: ABNORMAL /HPF
RBC UR QL AUTO: NEGATIVE
SP GR UR STRIP.AUTO: 1.02
UROBILINOGEN UR STRIP.AUTO-MCNC: 0.2 MG/DL
WBC #/AREA URNS HPF: ABNORMAL /HPF

## 2021-10-22 PROCEDURE — 81001 URINALYSIS AUTO W/SCOPE: CPT

## 2021-10-22 PROCEDURE — 93005 ELECTROCARDIOGRAM TRACING: CPT | Performed by: STUDENT IN AN ORGANIZED HEALTH CARE EDUCATION/TRAINING PROGRAM

## 2021-10-22 NOTE — ED PROVIDER NOTES
ED Provider Note    CHIEF COMPLAINT  Chief Complaint   Patient presents with   • Dizziness     Onset 1600. Pt states she was very close to falling.   • Abdominal Pain     Onset 1600. Pt states she wants to know if she is pregnant or not. Pt states it only hurts when touched.       HPI  Frances Ardon is a 45 y.o. female with history of developmental delay, asthma, hypertension, seizures, arthritis presenting with dizziness that she reports started when she was at work around 3:57 PM.  States she felt like the room was spinning and she was close to falling.  She states the dizziness is better now.  She denies chest pain or shortness of breath.  Denies vision changes, voice changes, focal weakness or tingling.  She reports intermittent abdominal tenderness, only when her abdomen is touched in the center of her abdomen. Denies tenderness on my exam.  She denies any abdominal pain as well as not being touched.  She denies nausea, vomiting, diarrhea, fevers.  Denies history of stroke.    REVIEW OF SYSTEMS  See HPI for further details. All other systems are negative.     PAST MEDICAL HISTORY   has a past medical history of Arthritis, Asthma, Dyslipidemia (12/27/2016), GERD (gastroesophageal reflux disease) (12/27/2016), History of seizures (12/27/2016), Hypertension, Hypothyroidism (12/27/2016), Mental retardation, mild (I.Q. 50-70) (12/27/2016), and Tobacco dependence (12/27/2016).    SOCIAL HISTORY  Social History     Tobacco Use   • Smoking status: Current Every Day Smoker     Packs/day: 1.00     Years: 12.00     Pack years: 12.00     Types: Cigarettes   • Smokeless tobacco: Never Used   Vaping Use   • Vaping Use: Never used   Substance and Sexual Activity   • Alcohol use: Yes     Alcohol/week: 0.0 oz     Comment: Occasionally   • Drug use: No   • Sexual activity: Not Currently     Partners: Male       SURGICAL HISTORY  patient denies any surgical history    CURRENT MEDICATIONS  Home Medications     Reviewed by  "Sylvia Bender R.N. (Registered Nurse) on 10/21/21 at 2214  Med List Status: Partial   Medication Last Dose Status   acetaminophen (TYLENOL) 500 MG Tab  Active   cephALEXin (KEFLEX) 500 MG Cap  Active   diclofenac sodium (VOLTAREN) 1 % Gel  Active   Diclofenac Sodium 1 % Gel  Active   Disposable Gloves (LATEX GLOVES LARGE) Misc  Active   ibuprofen (MOTRIN) 800 MG Tab  Active   ibuprofen (MOTRIN) 800 MG Tab  Active   levothyroxine (SYNTHROID) 50 MCG Tab  Active   levothyroxine (SYNTHROID) 88 MCG Tab  Active   loratadine (CLARITIN) 10 MG Tab  Active   naproxen (NAPROSYN) 500 MG Tab  Active   nicotine polacrilex (NICORETTE) 4 MG gum  Active   olopatadine (PATANOL) 0.1 % ophthalmic solution  Active   olopatadine (PATANOL) 0.1 % ophthalmic solution  Active   Omega-3 Fatty Acids (FISH OIL) 500 MG Cap  Active   omeprazole (PRILOSEC) 20 MG delayed-release capsule  Active   paliperidone (INVEGA) 3 MG ER tablet  Active   polyethylene glycol 3350 (MIRALAX) Powder  Active                ALLERGIES  No Known Allergies    PHYSICAL EXAM  VITAL SIGNS: /80   Pulse 68   Temp 36.8 °C (98.2 °F) (Temporal)   Resp 19   Ht 1.753 m (5' 9\")   Wt 125 kg (275 lb 5.7 oz)   SpO2 97%   BMI 40.66 kg/m²     Pulse ox interpretation: I interpret this pulse ox as normal.  Constitutional: Alert in no apparent distress.  Morbidly obese  HENT: No signs of trauma, Bilateral external ears normal, Nose normal.   Eyes: Pupils are equal and reactive, Conjunctiva normal, Non-icteric. EOMI, no nystagmus  Neck: Normal range of motion, No tenderness, Supple, No stridor.   Cardiovascular: Regular rate and rhythm, no murmurs.   Thorax & Lungs: Normal breath sounds, No respiratory distress, No wheezing, No chest tenderness.   Abdomen: Soft, No tenderness, No masses, No pulsatile masses. No peritoneal signs.  Skin: Warm, Dry, No erythema, No rash.   Extremities: Intact distal pulses, No edema, No tenderness, No cyanosis.  Musculoskeletal: Good range of " motion in all major joints. No major deformities noted.   Neurologic: 5/5 bilateral upper and lower extremity strength, Grossly intact sensation symmetrically, CN II-XII intact, Normal gait, Normal, fluent speech, GCS 15, normal finger-to-nose, normal visual fields  Psychiatric: Affect normal, Judgment normal, Mood normal.       DIAGNOSTIC STUDIES / PROCEDURES      LABS  Results for orders placed or performed during the hospital encounter of 10/21/21   CBC WITH DIFFERENTIAL   Result Value Ref Range    WBC 9.3 4.8 - 10.8 K/uL    RBC 3.95 (L) 4.20 - 5.40 M/uL    Hemoglobin 12.3 12.0 - 16.0 g/dL    Hematocrit 36.7 (L) 37.0 - 47.0 %    MCV 92.9 81.4 - 97.8 fL    MCH 31.1 27.0 - 33.0 pg    MCHC 33.5 (L) 33.6 - 35.0 g/dL    RDW 46.4 35.9 - 50.0 fL    Platelet Count 303 164 - 446 K/uL    MPV 9.0 9.0 - 12.9 fL    Neutrophils-Polys 50.10 44.00 - 72.00 %    Lymphocytes 34.90 22.00 - 41.00 %    Monocytes 8.40 0.00 - 13.40 %    Eosinophils 5.00 0.00 - 6.90 %    Basophils 1.20 0.00 - 1.80 %    Immature Granulocytes 0.40 0.00 - 0.90 %    Nucleated RBC 0.00 /100 WBC    Neutrophils (Absolute) 4.68 2.00 - 7.15 K/uL    Lymphs (Absolute) 3.26 1.00 - 4.80 K/uL    Monos (Absolute) 0.78 0.00 - 0.85 K/uL    Eos (Absolute) 0.47 0.00 - 0.51 K/uL    Baso (Absolute) 0.11 0.00 - 0.12 K/uL    Immature Granulocytes (abs) 0.04 0.00 - 0.11 K/uL    NRBC (Absolute) 0.00 K/uL   COMP METABOLIC PANEL   Result Value Ref Range    Sodium 138 135 - 145 mmol/L    Potassium 4.2 3.6 - 5.5 mmol/L    Chloride 103 96 - 112 mmol/L    Co2 24 20 - 33 mmol/L    Anion Gap 11.0 7.0 - 16.0    Glucose 100 (H) 65 - 99 mg/dL    Bun 15 8 - 22 mg/dL    Creatinine 0.87 0.50 - 1.40 mg/dL    Calcium 9.5 8.5 - 10.5 mg/dL    AST(SGOT) 16 12 - 45 U/L    ALT(SGPT) 16 2 - 50 U/L    Alkaline Phosphatase 76 30 - 99 U/L    Total Bilirubin 0.2 0.1 - 1.5 mg/dL    Albumin 4.4 3.2 - 4.9 g/dL    Total Protein 7.2 6.0 - 8.2 g/dL    Globulin 2.8 1.9 - 3.5 g/dL    A-G Ratio 1.6 g/dL   LIPASE    Result Value Ref Range    Lipase 25 11 - 82 U/L   HCG QUAL SERUM   Result Value Ref Range    Beta-Hcg Qualitative Serum Negative Negative   URINALYSIS,CULTURE IF INDICATED    Specimen: Urine   Result Value Ref Range    Color Yellow     Character Clear     Specific Gravity 1.020 <1.035    Ph 5.5 5.0 - 8.0    Glucose Negative Negative mg/dL    Ketones Negative Negative mg/dL    Protein Negative Negative mg/dL    Bilirubin Negative Negative    Urobilinogen, Urine 0.2 Negative    Nitrite Negative Negative    Leukocyte Esterase Small (A) Negative    Occult Blood Negative Negative    Micro Urine Req Microscopic    ESTIMATED GFR   Result Value Ref Range    GFR If African American >60 >60 mL/min/1.73 m 2    GFR If Non African American >60 >60 mL/min/1.73 m 2   URINE MICROSCOPIC (W/UA)   Result Value Ref Range    WBC 0-2 /hpf    RBC 0-2 /hpf    Bacteria Negative None /hpf    Epithelial Cells Few /hpf    Hyaline Cast 3-5 (A) /lpf   EKG   Result Value Ref Range    Report       [facility] Emergency Dept.    Test Date:  2021-10-22  Pt Name:    MORALES GOODEN             Department: ER  MRN:        [id number]                      Room:       [facility]  Gender:     Female                       Technician: [id number]  :        [id number]                   Requested By:LAKEISHA YOUNG  Order #:    [id number]                    Reading MD: Lakeisha Young    Measurements  Intervals                                Axis  Rate:       56                           P:          15  IA:         173                          QRS:        36  QRSD:       96                           T:          25  QT:         426  QTc:        411    Interpretive Statements  Sinus bradycardia rate of 56, normal axis, normal intervals, no ST  elevations,  depressions, T wave inversions, no Brugada, no WPW  Electronically Signed On 10- 2:05:56 PDT by Lakeisha Young           COURSE & MEDICAL DECISION MAKING  Pertinent Labs & Imaging studies  reviewed. (See chart for details)    45-year-old female presenting with dizziness that occurred while at work and has since resolved.  Her vital signs are normal.  Her neurological exam is nonfocal, do not suspect posterior circulation stroke/disruption.  Patient has no neck pain do not suspect dissection.  EKG shows no evidence of dysrhythmia, or ischemia.  Abdomen is nontender on exam here.  Patient is not pregnant.  Her LFTs and white blood cell count are all normal, do not suspect intra-abdominal infection, obstruction or other acute process. No UTI. Plan to discharge home.     The patient will not drink alcohol nor drive with prescribed medications. The patient will return for worsening symptoms and is stable at the time of discharge. The patient verbalizes understanding and will comply.    FINAL IMPRESSION  1. Dizziness           Electronically signed by: Brooke Leroy M.D., 10/22/2021 12:40 AM

## 2021-10-22 NOTE — ED TRIAGE NOTES
"Chief Complaint   Patient presents with   • Dizziness     Onset 1600. Pt states she was very close to falling.   • Abdominal Pain     Onset 1600. Pt states she wants to know if she is pregnant or not. Pt states it only hurts when touched.     Pt wheeled back to triage room. VSS on RA. GCS 15. NAD. Protocol ordered    Pt denies s/sx of COVID or recent exposure. Pt is vaccinated against COVID    Pt returned to lobby. Pt educated on triage process. Pt understands to notify staff of any changes or worsening of symptoms.    /78   Pulse 66   Temp 36.6 °C (97.9 °F) (Temporal)   Resp 15   Ht 1.753 m (5' 9\")   Wt 125 kg (275 lb 5.7 oz)   SpO2 95%   BMI 40.66 kg/m²     "

## 2021-11-11 ENCOUNTER — APPOINTMENT (OUTPATIENT)
Dept: URGENT CARE | Facility: PHYSICIAN GROUP | Age: 46
End: 2021-11-11
Payer: MEDICARE

## 2021-11-11 ENCOUNTER — OFFICE VISIT (OUTPATIENT)
Dept: URGENT CARE | Facility: CLINIC | Age: 46
End: 2021-11-11
Payer: MEDICARE

## 2021-11-11 ENCOUNTER — APPOINTMENT (OUTPATIENT)
Dept: RADIOLOGY | Facility: IMAGING CENTER | Age: 46
End: 2021-11-11
Attending: NURSE PRACTITIONER
Payer: MEDICARE

## 2021-11-11 VITALS
HEIGHT: 69 IN | TEMPERATURE: 97.8 F | WEIGHT: 273 LBS | OXYGEN SATURATION: 96 % | HEART RATE: 57 BPM | BODY MASS INDEX: 40.43 KG/M2 | RESPIRATION RATE: 16 BRPM | SYSTOLIC BLOOD PRESSURE: 120 MMHG | DIASTOLIC BLOOD PRESSURE: 60 MMHG

## 2021-11-11 DIAGNOSIS — S20.212A CONTUSION OF LEFT CHEST WALL, INITIAL ENCOUNTER: ICD-10-CM

## 2021-11-11 DIAGNOSIS — S29.9XXA INJURY OF CHEST WALL, INITIAL ENCOUNTER: ICD-10-CM

## 2021-11-11 PROCEDURE — 99214 OFFICE O/P EST MOD 30 MIN: CPT | Performed by: NURSE PRACTITIONER

## 2021-11-11 PROCEDURE — 71101 X-RAY EXAM UNILAT RIBS/CHEST: CPT | Mod: TC,LT | Performed by: NURSE PRACTITIONER

## 2021-11-11 RX ORDER — BUSPIRONE HYDROCHLORIDE 15 MG/1
15 TABLET ORAL
COMMUNITY

## 2021-11-11 RX ORDER — TRAZODONE HYDROCHLORIDE 50 MG/1
50 TABLET ORAL NIGHTLY
COMMUNITY
End: 2022-04-18

## 2021-11-11 RX ORDER — PROPRANOLOL HYDROCHLORIDE 10 MG/1
10 TABLET ORAL 3 TIMES DAILY
COMMUNITY
End: 2022-07-26

## 2021-11-11 RX ORDER — OXYBUTYNIN CHLORIDE 5 MG/1
5 TABLET ORAL 3 TIMES DAILY
COMMUNITY
End: 2022-07-26

## 2021-11-11 RX ORDER — HYDROXYZINE 50 MG/1
50 TABLET, FILM COATED ORAL
COMMUNITY

## 2021-11-11 RX ORDER — LISINOPRIL 5 MG/1
5 TABLET ORAL DAILY
COMMUNITY

## 2021-11-11 RX ORDER — LAMOTRIGINE 200 MG/1
200 TABLET ORAL DAILY
COMMUNITY

## 2021-11-11 ASSESSMENT — FIBROSIS 4 INDEX: FIB4 SCORE: 0.62

## 2021-11-11 NOTE — PROGRESS NOTES
Chief Complaint   Patient presents with   • Fall     (L) side pain, bruise, x3 days        HISTORY OF PRESENT ILLNESS: Patient is a pleasant 46 y.o. female who presents to urgent care today with complaints of chest wall pain.  Notes that she had an accidental ground-level fall 3 days ago, falling onto her left side striking the ground.  Since the incident she has had pain to her left chest wall.  Notes associated ecchymosis.  Denies any shortness of breath, abdominal pain, or other areas of injury or trauma.  They have not tried any medication for symptom relief.  She has tried ice for pain relief.  She is here today with her caregiver with whom she lives.    Patient Active Problem List    Diagnosis Date Noted   • Prediabetes 04/23/2019   • BMI 40.0-44.9, adult ([facility]) 03/20/2019   • Morbid obesity ([facility]) 05/25/2017   • Tobacco dependence 12/27/2016   • Hypothyroidism 12/27/2016   • GERD (gastroesophageal reflux disease) 12/27/2016   • Mental retardation, mild (I.Q. 50-70) 12/27/2016   • Dyslipidemia 12/27/2016   • History of seizures 12/27/2016       Allergies:Patient has no known allergies.    Current Outpatient Medications Ordered in Epic   Medication Sig Dispense Refill   • busPIRone (BUSPAR) 10 MG Tab tablet Take 10 mg by mouth 2 times a day.     • hydrOXYzine HCl (ATARAX) 50 MG Tab Take 50 mg by mouth 3 times a day as needed for Itching.     • lamoTRIgine (LAMICTAL) 100 MG Tab Take 100 mg by mouth every day.     • propranolol (INDERAL) 10 MG Tab Take 10 mg by mouth 3 times a day.     • traZODone (DESYREL) 50 MG Tab Take 50 mg by mouth every evening.     • lisinopril (PRINIVIL) 5 MG Tab Take 5 mg by mouth every day.     • Melatonin 3 MG Cap Take  by mouth.     • oxybutynin (DITROPAN) 5 MG Tab Take 5 mg by mouth 3 times a day.     • omeprazole (PRILOSEC) 20 MG delayed-release capsule OMEPRAZOLE 20 MG CPDR     • ibuprofen (MOTRIN) 800 MG Tab IBUPROFEN 800 MG TABS     • levothyroxine (SYNTHROID) 50 MCG Tab Take 1 Tab  by mouth Every morning on an empty stomach. 90 Tab 0   • cephALEXin (KEFLEX) 500 MG Cap KEFLEX 500 MG CAPS     • diclofenac sodium (VOLTAREN) 1 % Gel VOLTAREN 1 % GEL     • levothyroxine (SYNTHROID) 88 MCG Tab LEVOTHYROXINE SODIUM 88 MCG TABS     • ibuprofen (MOTRIN) 800 MG Tab Take 1 Tablet by mouth every 8 hours as needed. 30 Tablet 0   • acetaminophen (TYLENOL) 500 MG Tab ACETAMINOPHEN 500 MG TABS     • Disposable Gloves (LATEX GLOVES LARGE) Misc LATEX GLOVES LARGE     • nicotine polacrilex (NICORETTE) 4 MG gum NICOTINE POLACRILEX 4 MG GUM     • polyethylene glycol 3350 (MIRALAX) Powder POLYETHYLENE GLYCOL 3350 POWD     • olopatadine (PATANOL) 0.1 % ophthalmic solution OLOPATADINE HCL 0.1 % SOLN     • naproxen (NAPROSYN) 500 MG Tab Take 500 mg by mouth 2 times a day, with meals.     • Omega-3 Fatty Acids (FISH OIL) 500 MG Cap FISH  MG CAPS     • Diclofenac Sodium 1 % Gel Apply 1 Application to affected area(s) as needed.     • loratadine (CLARITIN) 10 MG Tab Take 10 mg by mouth every day. TAKE 1 TAB BY MOUTH EVERY DAY.  0   • olopatadine (PATANOL) 0.1 % ophthalmic solution Place 1 Drop in both eyes 2 times a day.     • paliperidone (INVEGA) 3 MG ER tablet Take 3 mg by mouth.       No current Epic-ordered facility-administered medications on file.       Past Medical History:   Diagnosis Date   • Arthritis    • Asthma    • Dyslipidemia 12/27/2016   • GERD (gastroesophageal reflux disease) 12/27/2016   • History of seizures 12/27/2016   • Hypertension    • Hypothyroidism 12/27/2016   • Mental retardation, mild (I.Q. 50-70) 12/27/2016   • Tobacco dependence 12/27/2016       Social History     Tobacco Use   • Smoking status: Current Every Day Smoker     Packs/day: 1.00     Years: 12.00     Pack years: 12.00     Types: Cigarettes   • Smokeless tobacco: Never Used   Vaping Use   • Vaping Use: Never used   Substance Use Topics   • Alcohol use: Yes     Alcohol/week: 0.0 oz     Comment: Occasionally   • Drug use: No  "      Family Status   Relation Name Status   • Mo  Alive   • Neg Hx  (Not Specified)     Family History   Problem Relation Age of Onset   • Cancer Neg Hx        ROS:  Review of Systems   Constitutional: Negative for fever, chills, weight loss, malaise, and fatigue.   HENT: Negative for ear pain, nosebleeds, congestion, sore throat and neck pain.    Eyes: Negative for vision changes.   Neuro: Negative for headache, sensory changes, weakness, seizure, LOC.   Cardiovascular: Negative for chest pain, palpitations, orthopnea and leg swelling.   Respiratory: Negative for cough, sputum production, shortness of breath and wheezing.   Gastrointestinal: Negative for abdominal pain, nausea, vomiting or diarrhea.   Genitourinary: Negative for dysuria, urgency and frequency.  Musculoskeletal: Positive for falls, left chest wall pain.  Negative for neck pain, back pain, joint pain, myalgias.   Skin: Positive for ecchymosis.  Negative for rash, diaphoresis.     Exam:  /60 (BP Location: Right arm, Patient Position: Sitting, BP Cuff Size: Adult long)   Pulse (!) 57   Temp 36.6 °C (97.8 °F) (Temporal)   Resp 16   Ht 1.753 m (5' 9\")   Wt 124 kg (273 lb)   SpO2 96%   General: well-nourished, well-developed female in NAD  Head: normocephalic, atraumatic  Eyes: PERRLA, no conjunctival injection, acuity grossly intact, lids normal.  Ears: normal shape and symmetry, no tenderness, no discharge. External canals are without any significant edema or erythema. Tympanic membranes are without any inflammation, no effusion. Gross auditory acuity is intact.  Nose: symmetrical without tenderness, no discharge.  Mouth/Throat: reasonable hygiene, no erythema, exudates or tonsillar enlargement.  Neck: no masses, range of motion within normal limits, no tracheal deviation. No obvious thyroid enlargement.   Lymph: no cervical adenopathy. No supraclavicular adenopathy.   Neuro: alert and oriented. Cranial nerves 1-12 grossly intact. No " "sensory deficit.   Cardiovascular: regular rate and rhythm. No edema.  Pulmonary: no distress. Chest is symmetrical with respiration, no wheezes, crackles, or rhonchi.   Abdomen: soft, non-tender, no guarding, no hepatosplenomegaly.  Musculoskeletal: no clubbing, appropriate muscle tone, gait is stable.  No midline spinal tenderness.  There is tenderness with palpation to chest wall, mid axillary region, without deformity or crepitus, with associated ecchymosis.  Skin: warm, dry, intact, no clubbing, no cyanosis, no rashes. + Ecchymosis.  Psych: appropriate mood, affect, judgement.       DX left chest wall radiology reading \"Normal rib series.\"      Assessment/Plan:  1. Contusion of left chest wall, initial encounter  [id number] (WITH 1-VIEW CXR) LEFT       Patient presents with left chest wall injury, x-rays negative for any acute process, suspect contusion.  Deep breathing exercises encouraged, OTC Motrin as needed.  Supportive care, differential diagnoses, and indications for immediate follow-up discussed with patient and caregiver.   Pathogenesis of diagnosis discussed including typical length and natural progression.   Instructed to return to clinic or nearest emergency department for any change in condition, further concerns, or worsening of symptoms.  Patient and caregiver state understanding of the plan of care and discharge instructions.  Instructed to make an appointment, for follow up, with her primary care provider.        Please note that this dictation was created using voice recognition software. I have made every reasonable attempt to correct obvious errors, but I expect that there are errors of grammar and possibly content that I did not discover before finalizing the note. I spent a total of 30 minutes with record review, exam, communication with the patient, and documentation of this encounter.      SUDHIR Barth.     "

## 2021-11-18 ENCOUNTER — NON-PROVIDER VISIT (OUTPATIENT)
Dept: OCCUPATIONAL MEDICINE | Facility: CLINIC | Age: 46
End: 2021-11-18
Payer: OTHER MISCELLANEOUS

## 2021-11-18 ENCOUNTER — HOSPITAL ENCOUNTER (EMERGENCY)
Facility: MEDICAL CENTER | Age: 46
End: 2021-11-18
Attending: EMERGENCY MEDICINE
Payer: OTHER MISCELLANEOUS

## 2021-11-18 ENCOUNTER — APPOINTMENT (OUTPATIENT)
Dept: RADIOLOGY | Facility: MEDICAL CENTER | Age: 46
End: 2021-11-18
Attending: EMERGENCY MEDICINE
Payer: OTHER MISCELLANEOUS

## 2021-11-18 VITALS
HEART RATE: 72 BPM | RESPIRATION RATE: 16 BRPM | SYSTOLIC BLOOD PRESSURE: 128 MMHG | DIASTOLIC BLOOD PRESSURE: 84 MMHG | WEIGHT: 276.9 LBS | HEIGHT: 69 IN | BODY MASS INDEX: 41.01 KG/M2 | TEMPERATURE: 98 F | OXYGEN SATURATION: 96 %

## 2021-11-18 DIAGNOSIS — Z02.1 PRE-EMPLOYMENT DRUG SCREENING: ICD-10-CM

## 2021-11-18 DIAGNOSIS — M25.552 LEFT HIP PAIN: ICD-10-CM

## 2021-11-18 DIAGNOSIS — Z02.83 ENCOUNTER FOR DRUG SCREENING: ICD-10-CM

## 2021-11-18 PROCEDURE — 99283 EMERGENCY DEPT VISIT LOW MDM: CPT

## 2021-11-18 PROCEDURE — 82075 ASSAY OF BREATH ETHANOL: CPT | Performed by: NURSE PRACTITIONER

## 2021-11-18 PROCEDURE — 73501 X-RAY EXAM HIP UNI 1 VIEW: CPT | Mod: LT

## 2021-11-18 PROCEDURE — 99026 IN-HOSPITAL ON CALL SERVICE: CPT | Performed by: NURSE PRACTITIONER

## 2021-11-18 ASSESSMENT — FIBROSIS 4 INDEX: FIB4 SCORE: 0.62

## 2021-11-18 NOTE — LETTER
"  FORM C-4:  EMPLOYEE’S CLAIM FOR COMPENSATION/ REPORT OF INITIAL TREATMENT  EMPLOYEE’S CLAIM - PROVIDE ALL INFORMATION REQUESTED   First Name Frances Last Name Duglas Birthdate 1975  Sex female Claim Number   Home Address [address]   [facility] [place] [facility]             [place] [address]                                   Age  46 y.o. 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 (if applicable) Getting up from table    How did this injury or occupational disease occur? Be specific and answer in detail. Use additional sheet if necessary)  I was getting up from the table and hit my left foot on the chair as well as my left hip   If you believe that you have an occupational disease, when did you first have knowledge of the disability and it relationship to your employment?  Witnesses to the Accident     Nature of Injury or Occupational Disease  Workers' Compensation Part(s) of Body Injured or Affected  Foot (L), Hip (L),    I CERTIFY THAT THE ABOVE IS TRUE AND CORRECT TO THE BEST OF MY KNOWLEDGE AND THAT I HAVE PROVIDED THIS INFORMATION IN ORDER TO OBTAIN THE BENEFITS OF [place] " [place]).  I HEREBY AUTHORIZE ANY PHYSICIAN, CHIROPRACTOR, SURGEON, PRACTITIONER, OR OTHER PERSON, ANY HOSPITAL, INCLUDING [facility] OR [facility] H[facility]ITAL, ANY MEDICAL SERVICE ORGANIZATION, ANY INSURANCE COMPANY, OR OTHER INSTITUTION OR ORGANIZATION TO RELEASE TO EACH OTHER, ANY MEDICAL OR OTHER INFORMATION, INCLUDING BENEFITS PAID OR PAYABLE, PERTINENT TO THIS INJURY OR DISEASE, EXCEPT INFORMATION RELATIVE TO DIAGNOSIS, TREATMENT AND/OR COUNSELING FOR AIDS, PSYCHOLOGICAL CONDITIONS, ALCOHOL OR CONTROLLED SUBSTANCES, FOR WHICH I MUST GIVE SPECIFIC AUTHORIZATION.  A PHOTOSTAT OF THIS AUTHORIZATION SHALL BE AS VALID AS THE ORIGINAL.  Date   11/18/2021       Place     [facility]    Employee’s Signature   THIS REPORT MUST BE COMPLETED AND MAILED WITHIN 3 WORKING DAYS OF TREATMENT   Place [facility], EMERGENCY DEPT                       Name of Facility [facility]   Date  11/18/2021 Diagnosis  (M25.552) Left hip pain Is there evidence the injured employee was under the influence of alcohol and/or another controlled substance at the time of accident?   Hour  9:03 PM Description of Injury or Disease  Left hip pain No   Treatment     Have you advised the patient to remain off work five days or more?         No   X-Ray Findings  Negative If Yes   From Date    To Date      From information given by the employee, together with medical evidence, can you directly connect this injury or occupational disease as job incurred? Yes If No, is employee capable of: Full Duty  Yes Modified Duty      Is additional medical care by a physician indicated? No If Modified Duty, Specify any Limitations / Restrictions       Do you know of any previous injury or disease contributing to this condition or occupational disease? No    Date 11/18/2021 Print Doctor’s Name Manish Alexander I certify the employer’s copy of this form was mailed on:   Address [address]  [place] [address]  [phone] INSURER’S USE ONLY   Provider’s Tax ID Number   Telephone Dept: [phone]    Doctor’s Signature DALE Boswell M.D. Degree M.D      Form C-4 (rev.10/07)                                                                         BRIEF DESCRIPTION OF RIGHTS AND BENEFITS  (Pursuant to [place] 616C.050)    Notice of Injury or Occupational Disease (Incident Report Form C-1): If an injury or occupational disease (OD) arises out of and in the course of employment, you must provide written notice to your employer as soon as practicable, but no later than 7 days after the accident or OD. Your employer shall maintain a sufficient supply of the required forms.    Claim for Compensation (Form C-4): If medical treatment is sought, the form C-4 is available at the place of initial treatment. A completed \"Claim for Compensation\" (Form C-4) must be filed within 90 days after an accident or OD. The treating physician or chiropractor must, within 3 working days after treatment, complete and mail to the employer, the employer's insurer and third-party , the Claim for Compensation.    Medical Treatment: If you require medical treatment for your on-the-job injury or OD, you may be required to select a physician or chiropractor from a list provided by your workers’ compensation insurer, if it has contracted with an Organization for Managed Care (MCO) or Preferred Provider Organization (PPO) or providers of health care. If your employer has not entered into a contract with an MCO or PPO, you may select a physician or chiropractor from the Panel of Physicians and Chiropractors. Any medical costs related to your industrial injury or OD will be paid by your insurer.    Temporary Total Disability (TTD): If your doctor has certified that you are unable to work for a period of at least 5 consecutive days, or 5 cumulative days in a 20-day period, or places restrictions on you that your " employer does not accommodate, you may be entitled to TTD compensation.    Temporary Partial Disability (TPD): If the wage you receive upon reemployment is less than the compensation for TTD to which you are entitled, the insurer may be required to pay you TPD compensation to make up the difference. TPD can only be paid for a maximum of 24 months.    Permanent Partial Disability (PPD): When your medical condition is stable and there is an indication of a PPD as a result of your injury or OD, within 30 days, your insurer must arrange for an evaluation by a rating physician or chiropractor to determine the degree of your PPD. The amount of your PPD award depends on the date of injury, the results of the PPD evaluation, your age and wage.    Permanent Total Disability (PTD): If you are medically certified by a treating physician or chiropractor as permanently and totally disabled and have been granted a PTD status by your insurer, you are entitled to receive monthly benefits not to exceed 66 2/3% of your average monthly wage. The amount of your PTD payments is subject to reduction if you previously received a lump-sum PPD award.    Vocational Rehabilitation Services: You may be eligible for vocational rehabilitation services if you are unable to return to the job due to a permanent physical impairment or permanent restrictions as a result of your injury or occupational disease.    Transportation and Per Linnea Reimbursement: You may be eligible for travel expenses and per linnea associated with medical treatment.    Reopening: You may be able to reopen your claim if your condition worsens after claim closure.     Appeal Process: If you disagree with a written determination issued by the insurer or the insurer does not respond to your request, you may appeal to the Department of Administration, , by following the instructions contained in your determination letter. You must appeal the determination within 70  days from the date of the determination letter at [address], [address], [place], [place] [address], or [address], [address], [place], [place] [address]. If you disagree with the  decision, you may appeal to the Department of Administration, . You must file your appeal within 30 days from the date of the  decision letter at [address], [address], [place], [place] [address], or [address], [address], [place], [place] [address]. If you disagree with a decision of an , you may file a petition for judicial review with the District Court. You must do so within 30 days of the Appeal Officer’s decision. You may be represented by an  at your own expense or you may contact the [facility] for possible representation.    [place]  for Injured Workers (NAIW): If you disagree with a  decision, you may request that NAIW represent you without charge at an  Hearing. For information regarding denial of benefits, you may contact the [facility] at: [address], [address], [place] [address], [phone], or [address], [address], [place] [address], [phone]    To File a Complaint with the Division: If you wish to file a complaint with the  of the Division of Industrial Relations (DIR),  please contact the Workers’ Compensation Section, [address], [address], [place], [place] [address], telephone [phone], or [address], [address], [place], [place] [address], telephone [phone].    For assistance with Workers’ Compensation Issues: You may contact the [facility] Office for Consumer Health Assistance, [address], [address], [place], [place] [address], Toll Free 1-338.450.6446, Web site: http://[facility].[place].gov/Programs/PETRA E-mail: petra@[facility].[place].gov  D-2 (rev. 10/20)               __________________________________________________________________                                    ___11/18/2021____            Employee Name / Signature                                                                                                                            Date

## 2021-11-19 LAB
BREATH ALCOHOL COMMENT: NORMAL
POC BREATHALIZER: 0 PERCENT (ref 0–0.01)

## 2021-11-19 NOTE — ED NOTES
Patient going to x-ray. Pt's mother Marisol called, RN spoke with jazz and discussed plan of care. Notified patient to turn on phone to contact family.

## 2021-11-19 NOTE — ED NOTES
Patient ambulatory to Ortho 2  for triage complaint. Pt placed in gown and placed on monitor. Pt ambulatory to the bathroom with a steady gait.

## 2021-11-19 NOTE — ED NOTES
"Pt discharged home with friend. Pt is A/O x 4, ambulatory with a steady gait. Patient in possession of all belongings. Pt provided discharge education and information pertaining to medications and follow up appointments. Discussed signs and symptoms to return to the ER, patient verbalized understanding. Pt received copy of discharge instructions and verbalized understanding.     /84   Pulse 72   Temp 36.7 °C (98 °F) (Temporal)   Resp 16   Ht 1.753 m (5' 9\")   Wt (!) 126 kg (276 lb 14.4 oz)   SpO2 96%   BMI 40.89 kg/m²     "

## 2021-11-19 NOTE — ED PROVIDER NOTES
"ED Provider Note    CHIEF COMPLAINT  Chief Complaint   Patient presents with   • Hip Pain     left hip pain, states she hit her hip on the chair. Gait is steady with no visible limp, no c/o pain after walking       HPI  Frances Ardon is a 46 y.o. female who presents with left hip pain.  She states that she was sitting in a chair and hit her left hip on the arm of the chair and has severe left hip pain.  She was at work at the time and so this is a Workmen's Compensation case.  No open wounds.  Patient is able to ambulate.  She insists that the pain is rather severe.  No numbness or weakness in her legs.    REVIEW OF SYSTEMS  See HPI for further details. All other systems are negative.     PAST MEDICAL HISTORY   has a past medical history of Arthritis, Asthma, Dyslipidemia (12/27/2016), GERD (gastroesophageal reflux disease) (12/27/2016), History of seizures (12/27/2016), Hypertension, Hypothyroidism (12/27/2016), Mental retardation, mild (I.Q. 50-70) (12/27/2016), and Tobacco dependence (12/27/2016).    SOCIAL HISTORY  Social History     Tobacco Use   • Smoking status: Current Every Day Smoker     Packs/day: 1.00     Years: 12.00     Pack years: 12.00     Types: Cigarettes   • Smokeless tobacco: Never Used   Vaping Use   • Vaping Use: Never used   Substance and Sexual Activity   • Alcohol use: Yes     Alcohol/week: 0.0 oz     Comment: Occasionally   • Drug use: No   • Sexual activity: Not Currently     Partners: Male       SURGICAL HISTORY  patient denies any surgical history    CURRENT MEDICATIONS  Home Medications    **Home medications have not yet been reviewed for this encounter**         ALLERGIES  No Known Allergies    PHYSICAL EXAM  VITAL SIGNS: /39   Pulse 80   Temp 36.3 °C (97.3 °F) (Temporal)   Resp 16   Ht 1.753 m (5' 9\")   Wt (!) 126 kg (276 lb 14.4 oz)   SpO2 94%   BMI 40.89 kg/m²   Pulse ox interpretation: I interpret this pulse ox as normal.  Constitutional: Alert in no apparent " distress.  HENT: No signs of trauma  Cardiovascular: Regular rate and rhythm.   Thorax & Lungs: Normal breath sounds, No respiratory distress, No wheezing, No chest tenderness.   Abdomen: Bowel sounds normal, Soft, No tenderness, No masses, No pulsatile masses. No peritoneal signs.  Skin: Warm, Dry, No erythema, No rash.   Back: No bony tenderness, No CVA tenderness.   Extremities: Intact distal pulses, No edema, left hip tenderness, No cyanosis  Musculoskeletal: Good range of motion in all major joints.  Left hip tenderness to palpation and no major deformities noted.   Neurologic: Alert, Normal motor function and gait, No focal deficits noted.       DIAGNOSTIC STUDIES / PROCEDURES      RADIOLOGY  DX-HIP-UNILATERAL-WITH PELVIS-1 VIEW LEFT   Final Result      No radiographic evidence of acute displaced fracture of the left hip. No dislocation identified.          COURSE & MEDICAL DECISION MAKING    Medications - No data to display    Pertinent Labs & Imaging studies reviewed. (See chart for details)  46 y.o. female presenting with left hip pain from rather minor trauma.  She is ambulatory.  No gross bony deformity.  Neurovascular intact distally.  No open wounds.  No signs of trauma or bruising.  She does have left upper hip/pelvis tenderness around the iliac crest to the hip joint.  No flank pain.  No pain along the left ribs.  Low suspicion for splenic or renal injury.  No bruising visible.  No open wounds.  Suspect a musculoskeletal injury.  In an abundance of caution, x-ray was performed which is unremarkable.  No evidence of fracture or dislocation.    I suspect a very minor contusion resulting in the patient's discomfort.  She was cleared to return back to work.  Recommending acetaminophen and/or ibuprofen as needed for pain management over the next few days.  Otherwise, to follow-up with Workmen's Compensation office and or primary care physician for further management.    The patient was instructed to  "follow-up with primary care physician for further management.  To return immediately for any worsening symptoms or development of any other concerning signs or symptoms. The patient verbalizes understanding in their own words.    /84   Pulse 72   Temp 36.7 °C (98 °F) (Temporal)   Resp 16   Ht 1.753 m (5' 9\")   Wt (!) 126 kg (276 lb 14.4 oz)   SpO2 96%   BMI 40.89 kg/m²     The patient was referred to primary care where they will receive further BP management.      Jac Fuentes, A.P.R.N.  [address] [id number] [phone]    Schedule an appointment as soon as possible for a visit       [facility], Emergency Dept  [address] [address] [phone]    As needed, If symptoms worsen      FINAL IMPRESSION  1. Left hip pain            Electronically signed by: Manish Alexander M.D., 11/18/2021 8:05 PM    "

## 2021-11-19 NOTE — ED TRIAGE NOTES
Chief Complaint   Patient presents with   • Hip Pain     left hip pain, states she hit her hip on the chair. Gait is steady with no visible limp, no c/o pain after walking

## 2021-11-23 ENCOUNTER — HOSPITAL ENCOUNTER (EMERGENCY)
Facility: MEDICAL CENTER | Age: 46
End: 2021-11-23
Attending: EMERGENCY MEDICINE
Payer: MEDICARE

## 2021-11-23 ENCOUNTER — GYNECOLOGY VISIT (OUTPATIENT)
Dept: OBGYN | Facility: CLINIC | Age: 46
End: 2021-11-23
Payer: MEDICARE

## 2021-11-23 ENCOUNTER — APPOINTMENT (OUTPATIENT)
Dept: RADIOLOGY | Facility: MEDICAL CENTER | Age: 46
End: 2021-11-23
Attending: EMERGENCY MEDICINE
Payer: MEDICARE

## 2021-11-23 VITALS
BODY MASS INDEX: 40.29 KG/M2 | WEIGHT: 272 LBS | OXYGEN SATURATION: 94 % | RESPIRATION RATE: 16 BRPM | DIASTOLIC BLOOD PRESSURE: 66 MMHG | HEIGHT: 69 IN | HEART RATE: 64 BPM | TEMPERATURE: 97.4 F | SYSTOLIC BLOOD PRESSURE: 139 MMHG

## 2021-11-23 VITALS
BODY MASS INDEX: 40.29 KG/M2 | DIASTOLIC BLOOD PRESSURE: 80 MMHG | WEIGHT: 272 LBS | HEIGHT: 69 IN | SYSTOLIC BLOOD PRESSURE: 125 MMHG

## 2021-11-23 DIAGNOSIS — R42 VERTIGO: ICD-10-CM

## 2021-11-23 DIAGNOSIS — Z30.09 STERILIZATION CONSULT: Primary | ICD-10-CM

## 2021-11-23 DIAGNOSIS — R51.9 ACUTE NONINTRACTABLE HEADACHE, UNSPECIFIED HEADACHE TYPE: ICD-10-CM

## 2021-11-23 DIAGNOSIS — S16.1XXA STRAIN OF NECK MUSCLE, INITIAL ENCOUNTER: ICD-10-CM

## 2021-11-23 PROBLEM — F20.9 SCHIZOPHRENIA (HCC): Status: ACTIVE | Noted: 2021-11-23

## 2021-11-23 PROCEDURE — A9270 NON-COVERED ITEM OR SERVICE: HCPCS | Performed by: EMERGENCY MEDICINE

## 2021-11-23 PROCEDURE — 70450 CT HEAD/BRAIN W/O DYE: CPT | Mod: ME

## 2021-11-23 PROCEDURE — 99213 OFFICE O/P EST LOW 20 MIN: CPT | Performed by: OBSTETRICS & GYNECOLOGY

## 2021-11-23 PROCEDURE — 99284 EMERGENCY DEPT VISIT MOD MDM: CPT

## 2021-11-23 PROCEDURE — 72040 X-RAY EXAM NECK SPINE 2-3 VW: CPT

## 2021-11-23 PROCEDURE — 96372 THER/PROPH/DIAG INJ SC/IM: CPT

## 2021-11-23 PROCEDURE — 700102 HCHG RX REV CODE 250 W/ 637 OVERRIDE(OP): Performed by: EMERGENCY MEDICINE

## 2021-11-23 PROCEDURE — 700111 HCHG RX REV CODE 636 W/ 250 OVERRIDE (IP): Performed by: EMERGENCY MEDICINE

## 2021-11-23 RX ORDER — MECLIZINE HYDROCHLORIDE 25 MG/1
25 TABLET ORAL ONCE
Status: COMPLETED | OUTPATIENT
Start: 2021-11-23 | End: 2021-11-23

## 2021-11-23 RX ORDER — KETOROLAC TROMETHAMINE 30 MG/ML
30 INJECTION, SOLUTION INTRAMUSCULAR; INTRAVENOUS ONCE
Status: COMPLETED | OUTPATIENT
Start: 2021-11-23 | End: 2021-11-23

## 2021-11-23 RX ORDER — MECLIZINE HYDROCHLORIDE 25 MG/1
25 TABLET ORAL 3 TIMES DAILY PRN
Qty: 30 TABLET | Refills: 0 | Status: SHIPPED | OUTPATIENT
Start: 2021-11-23 | End: 2022-07-26

## 2021-11-23 RX ADMIN — KETOROLAC TROMETHAMINE 30 MG: 30 INJECTION, SOLUTION INTRAMUSCULAR at 20:01

## 2021-11-23 RX ADMIN — MECLIZINE HYDROCHLORIDE 25 MG: 25 TABLET ORAL at 20:01

## 2021-11-23 ASSESSMENT — FIBROSIS 4 INDEX
FIB4 SCORE: 0.62
FIB4 SCORE: 0.62

## 2021-11-23 NOTE — Clinical Note
Surgery: Laparoscopic bilateral salpingectomy  Medicaid consent signed today. Schedule for January  Needs annual exam/preop visit in January/prior to surgery

## 2021-11-23 NOTE — NON-PROVIDER
Patient here c/o  LMP=  BCM:not sexually active   Last pap date/result:  Last mammogram if applicable  Phone number: [phone]  Pharmacy confirmed.

## 2021-11-23 NOTE — PROGRESS NOTES
New GYN Problem Note    CC:   Sterilization consult    HPI:   Patient is a 46 y.o.  who presents to discuss permanent sterilization.  She has been sexually active in the past with 1 partner but is not currently. She would like to make sure that there is no chance in the future of getting pregnant.     She is a patient of [facility] (assisted living for patients with mental health) for schizophrenia, mild MR.      She reports she is due for annual exam and pap but would like to defer that today.      GYNECOLOGIC HISTORY:   Current Sexual Activity: no  History of sexually transmitted diseases? No  Abnormal discharge? No        Contraception  none     Pap History  Last Pap: due  Hx Moderate or Severe Dysplasia : No     Sexually active:    Social History     Substance and Sexual Activity   Sexual Activity Not Currently   • Partners: Male       OBSTETRIC HISTORY:  OB History    Para Term  AB Living   0 0 0 0 0 0   SAB IAB Ectopic Molar Multiple Live Births   0 0 0   0         MEDICAL HISTORY:  Past Medical History:   Diagnosis Date   • Arthritis    • Asthma    • Dyslipidemia 2016   • GERD (gastroesophageal reflux disease) 2016   • History of seizures 2016   • Hypertension    • Hypothyroidism 2016   • Mental retardation, mild (I.Q. 50-70) 2016   • Tobacco dependence 2016       SURGICAL HISTORY:  Past Surgical History:   Procedure Laterality Date   • OPEN REDUCTION         FAMILY HISTORY:  Family History   Problem Relation Age of Onset   • Cancer Neg Hx        ALLERGIES / REACTIONS:  No Known Allergies    SOCIAL HISTORY:   reports that she has been smoking cigarettes. She has a 12.00 pack-year smoking history. She has never used smokeless tobacco. She reports current alcohol use. She reports that she does not use drugs.    ROS:   Positive ROS: none  Gen: no fevers or chills, no significant weight loss or gain, excessive fatigue  Respiratory:  no cough or  "dyspnea  Cardiac:  no chest pain, no palpitations, no syncope  Breast: no breast discharge, pain, lump or skin changes  GI:  no heartburn, no abdominal pain, no nausea or vomiting  Urinary: no dysuria, urgency, frequency, incontinence   Psych: no depression or anxiety  Neuro: no migraines with aura, fainting spells, numbness or tingling  Extremities: no joint pain, persistently swollen ankles, recurrent leg cramps       PHYSICAL EXAMINATION:  Vital Signs:   Vitals:    21 1345   BP: 125/80   BP Location: Right arm   Patient Position: Sitting   BP Cuff Size: Adult   Weight: 123 kg (272 lb)   Height: 1.753 m (5' 9\")     Body mass index is 40.17 kg/m².  Constitutional: The patient is well developed and well nourished.  Psychiatric: Patient is oriented to time place and person.     Exam deferred today    ASSESSMENT AND PLAN:  46 y.o.       1. Sterilization consult   - reviewed specifics of permanent sterilization   - reviewed surgical procedure and irreversible nature of procedure   - medicaid consent signed today   - pt to return in January for annual/pap and this will serve as preop H&P        Kimmy Dolan D.O.    "

## 2021-11-24 NOTE — ED NOTES
DC home with written and verbal instructions regarding f/u, activity and RX for meclizine. Verbalized understanding, ambulated out with all belongings.    PAIN MEDICATION GIVEN FOR LEFT LEG PER REQUEST. NO OTHER NEEDS AT THIS TIME.

## 2021-11-24 NOTE — ED PROVIDER NOTES
"ED Provider Note    CHIEF COMPLAINT  Chief Complaint   Patient presents with   • Headache     Pt reports for multiple symptoms - reports having a HA, dizziness, SOB and \"falling at work\" but denies any LOC. Pt was recently seen at [facility] earlier for same symptoms.        HPI  Frances Ardon is a 46 y.o. female who presents for evaluation of a headache, a dizziness described as a spinning sensation, a subsequent ground-level fall and new neck pain.  Patient has been evaluated multiple times at this emergency department as well as [facility] for various complaints.  She states that she does have chronic headaches but this 1 seems little bit different.  This the first time she is experiencing started dizziness.  She has no chest pain or abdominal pain, no vomiting.  She denies any focal weakness numbness or tingling.  Past medical history is listed below.    REVIEW OF SYSTEMS  Negative for fever, rash, chest pain, dyspnea, abdominal pain, nausea, vomiting, diarrhea, focal weakness, focal numbness, focal tingling, back pain. All other systems are negative.     PAST MEDICAL HISTORY  Past Medical History:   Diagnosis Date   • Arthritis    • Asthma    • Dyslipidemia 12/27/2016   • GERD (gastroesophageal reflux disease) 12/27/2016   • History of seizures 12/27/2016   • Hypertension    • Hypothyroidism 12/27/2016   • Mental retardation, mild (I.Q. 50-70) 12/27/2016   • Tobacco dependence 12/27/2016       FAMILY HISTORY  Family History   Problem Relation Age of Onset   • Cancer Neg Hx        SOCIAL HISTORY  Social History     Tobacco Use   • Smoking status: Current Every Day Smoker     Packs/day: 1.00     Years: 12.00     Pack years: 12.00     Types: Cigarettes   • Smokeless tobacco: Never Used   Vaping Use   • Vaping Use: Never used   Substance Use Topics   • Alcohol use: Yes     Alcohol/week: 0.0 oz     Comment: Occasionally   • Drug use: No       SURGICAL HISTORY  Past Surgical History: " "  Procedure Laterality Date   • OPEN REDUCTION         CURRENT MEDICATIONS  I personally reviewed the medication list in the charting documentation.     ALLERGIES  No Known Allergies    MEDICAL RECORD  I have reviewed patient's medical record and pertinent results are listed above.      PHYSICAL EXAM  VITAL SIGNS: /77   Pulse 80   Temp 36.2 °C (97.1 °F) (Temporal)   Resp 16   Ht 1.753 m (5' 9\")   Wt 123 kg (272 lb)   LMP 11/01/2021   SpO2 100%   BMI 40.17 kg/m²    Constitutional: Well appearing patient in no acute distress.  Awake and alert, not toxic nor ill in appearance.  Awake, alert, sitting up in bed.  HENT: Normocephalic, no obvious evidence of acute trauma.  Eyes: No scleral icterus. Normal conjunctiva.  Extraocular motion intact.  Pupils equal reactive.  No nystagmus.  Neck: Comfortable movement without any obvious restriction in the range of motion.  No midline tenderness.  Thorax & Lungs: Normal nonlabored respirations.  Skin: The exposed portions of skin reveal no obvious rash or other abnormalities.  Extremities/Musculoskeletal: No obvious sign of acute trauma. No asymmetric calf tenderness or edema.   Neurologic: Alert & oriented. No focal deficits observed.  Normal and symmetric upper and lower extremity motor and sensory function bilaterally.  Psychiatric: Normal affect appropriate for the clinical situation.    DIAGNOSTIC STUDIES / PROCEDURES    RADIOLOGY  CT-HEAD W/O   Final Result      Negative noncontrast CT scan of the head / brain.         DX-CERVICAL SPINE-2 OR 3 VIEWS   Final Result      Limited cervical spine series showing no gross abnormality            COURSE & MEDICAL DECISION MAKING  I have reviewed any medical record information, laboratory studies and radiographic results as noted above.    Encounter Summary: This is a very pleasant 46 y.o. female who unfortunately required evaluation in the emergency department today with vertigo in the setting of a headache and " multiple falls, head CT is obtained here to exclude serious intracranial injury or hemorrhage, this is in fact negative.  She also had some neck pain, x-ray of her neck is unremarkable, no focal neurologic complaints or findings.  Treated with Toradol and meclizine, feeling better, will be discharged home with prescription for meclizine, strict return instructions provided      DISPOSITION: Discharged home in stable condition      FINAL IMPRESSION  1. Vertigo    2. Strain of neck muscle, initial encounter    3. Acute nonintractable headache, unspecified headache type           This dictation was created using voice recognition software. The accuracy of the dictation is limited to the abilities of the software. I expect there may be some errors of grammar and possibly content. The nursing notes were reviewed and certain aspects of this information were incorporated into this note.    Electronically signed by: Mann Rosenbaum M.D., 11/23/2021 7:53 PM

## 2021-11-24 NOTE — ED NOTES
Pt medicated per MAR. Pt states fell while at work and then became dizzy, states hit front/L side of neck. ROM WNL but states painful. No numbness/tingling. Denies n/v/d. Pt resting, no distress, call light within reach.

## 2021-11-24 NOTE — ED TRIAGE NOTES
"Frances Ardon  46 y.o. female  Chief Complaint   Patient presents with   • Headache     Pt reports for multiple symptoms - reports having a HA, dizziness, SOB and \"falling at work\" but denies any LOC. Pt was recently seen at [facility] earlier for same symptoms.        Vitals:    11/23/21 1739   BP: 119/77   Pulse: 80   Resp: 16   Temp: 36.2 °C (97.1 °F)   SpO2: 100%        Patient BIBA and placed in lobby for triage for the complaint above - pt in triage states multiple complaints but is c/o a headache at this time. Pt reports being at work earlier today and having a episode of where they were dizzy and \"passed out\". Pt also states that they are currently SOB.     Patient is in stable condition at time of triage, has been educated on the triage process and placed back into the ED lobby at this time - pt has verbalized understanding of this process.     RN encouraged patient to alert staff at front for any changes that may occur while they are waiting to be evaluated by an ERP.     Of note, this RN and patient are in proper PPE and has a mask in place at all times during this encounter.     Urine specimen cup provided to patient in triage.    Past Medical History:   Diagnosis Date   • Arthritis    • Asthma    • Dyslipidemia 12/27/2016   • GERD (gastroesophageal reflux disease) 12/27/2016   • History of seizures 12/27/2016   • Hypertension    • Hypothyroidism 12/27/2016   • Mental retardation, mild (I.Q. 50-70) 12/27/2016   • Tobacco dependence 12/27/2016        "

## 2021-11-24 NOTE — DISCHARGE PLANNING
note:  Received a call from [facility] because they cannot fill a prescription for Meclizine as pt no longer lives in a group home.  [facility] fill medications for group homes only.     CM called pt and her caregiver, Vanna. They would want the prescription sent to [facility]. Email sent to Kimmy Anglin to transfer prescription to [facility].

## 2021-12-02 ENCOUNTER — APPOINTMENT (OUTPATIENT)
Dept: RADIOLOGY | Facility: MEDICAL CENTER | Age: 46
End: 2021-12-02
Attending: EMERGENCY MEDICINE
Payer: MEDICARE

## 2021-12-02 ENCOUNTER — HOSPITAL ENCOUNTER (EMERGENCY)
Facility: MEDICAL CENTER | Age: 46
End: 2021-12-02
Attending: EMERGENCY MEDICINE
Payer: MEDICARE

## 2021-12-02 VITALS
HEIGHT: 69 IN | DIASTOLIC BLOOD PRESSURE: 92 MMHG | WEIGHT: 272 LBS | HEART RATE: 69 BPM | TEMPERATURE: 97 F | RESPIRATION RATE: 16 BRPM | OXYGEN SATURATION: 95 % | BODY MASS INDEX: 40.29 KG/M2 | SYSTOLIC BLOOD PRESSURE: 134 MMHG

## 2021-12-02 DIAGNOSIS — M25.551 ACUTE HIP PAIN, BILATERAL: ICD-10-CM

## 2021-12-02 DIAGNOSIS — M25.552 ACUTE HIP PAIN, BILATERAL: ICD-10-CM

## 2021-12-02 PROCEDURE — 72170 X-RAY EXAM OF PELVIS: CPT

## 2021-12-02 PROCEDURE — 99283 EMERGENCY DEPT VISIT LOW MDM: CPT

## 2021-12-02 PROCEDURE — 96372 THER/PROPH/DIAG INJ SC/IM: CPT

## 2021-12-02 PROCEDURE — 700111 HCHG RX REV CODE 636 W/ 250 OVERRIDE (IP): Performed by: EMERGENCY MEDICINE

## 2021-12-02 RX ORDER — KETOROLAC TROMETHAMINE 30 MG/ML
30 INJECTION, SOLUTION INTRAMUSCULAR; INTRAVENOUS ONCE
Status: DISCONTINUED | OUTPATIENT
Start: 2021-12-02 | End: 2021-12-02

## 2021-12-02 RX ORDER — KETOROLAC TROMETHAMINE 30 MG/ML
30 INJECTION, SOLUTION INTRAMUSCULAR; INTRAVENOUS ONCE
Status: COMPLETED | OUTPATIENT
Start: 2021-12-02 | End: 2021-12-02

## 2021-12-02 RX ADMIN — KETOROLAC TROMETHAMINE 30 MG: 30 INJECTION, SOLUTION INTRAMUSCULAR at 19:15

## 2021-12-02 ASSESSMENT — FIBROSIS 4 INDEX: FIB4 SCORE: 0.62

## 2021-12-03 NOTE — DISCHARGE INSTRUCTIONS
Ice for 24 hours then moist heat or hot tub soaks in Epsom salts  Take your current medications with food apply Voltaren gel to your hips as needed.  Follow-up with your primary care provider in 1 week for recheck.

## 2021-12-03 NOTE — ED PROVIDER NOTES
ED Provider Note     Scribed for Camelia Godfrey D.O. by Jessika Wren. 12/2/2021, 5:56 PM.     Primary care provider: TWILA Escoto  Means of arrival: EMS         History obtained from: Patient  History limited by: None    CHIEF COMPLAINT  Chief Complaint   Patient presents with   • Hip Pain   • Headache       HPI  Frances Ardon is a 46 y.o. female who presents to the emergency Department via EMS for evaluation of acute bilateral hip pain. She states that she was at work when she was bending over and felt both of her hips pop. She is also having right shin pain and a headache. No fevers or other injuries. The patient is fully vaccinated against COVID-19.    REVIEW OF SYSTEMS  Pertinent positives include hip pain, shin pain, and headache. Pertinent negatives include no fevers.   See HPI for further details. All other systems are negative.    PAST MEDICAL HISTORY  Past Medical History:   Diagnosis Date   • Arthritis    • Asthma    • Dyslipidemia 12/27/2016   • GERD (gastroesophageal reflux disease) 12/27/2016   • History of seizures 12/27/2016   • Hypertension    • Hypothyroidism 12/27/2016   • Mental retardation, mild (I.Q. 50-70) 12/27/2016   • Tobacco dependence 12/27/2016       FAMILY HISTORY  Family History   Problem Relation Age of Onset   • Cancer Neg Hx        SOCIAL HISTORY  Social History     Tobacco Use   • Smoking status: Current Every Day Smoker     Packs/day: 1.00     Years: 12.00     Pack years: 12.00     Types: Cigarettes   • Smokeless tobacco: Never Used   Vaping Use   • Vaping Use: Never used   Substance Use Topics   • Alcohol use: Yes     Alcohol/week: 0.0 oz     Comment: Occasionally   • Drug use: No      Social History     Substance and Sexual Activity   Drug Use No       SURGICAL HISTORY  Past Surgical History:   Procedure Laterality Date   • OPEN REDUCTION         CURRENT MEDICATIONS  Current Outpatient Medications   Medication Instructions   • acetaminophen (TYLENOL) 500 MG  "Tab ACETAMINOPHEN 500 MG TABS   • busPIRone (BUSPAR) 10 mg, Oral, 2 TIMES DAILY   • cephALEXin (KEFLEX) 500 MG Cap KEFLEX 500 MG CAPS   • diclofenac sodium (VOLTAREN) 1 % Gel VOLTAREN 1 % GEL   • Diclofenac Sodium 1 % Gel 1 Application, Topical, PRN   • Disposable Gloves (LATEX GLOVES LARGE) Misc LATEX GLOVES LARGE   • hydrOXYzine HCl (ATARAX) 50 mg, Oral, 3 TIMES DAILY PRN   • ibuprofen (MOTRIN) 800 MG Tab IBUPROFEN 800 MG TABS   • ibuprofen (MOTRIN) 800 mg, Oral, EVERY 8 HOURS PRN   • lamoTRIgine (LAMICTAL) 100 mg, Oral, DAILY   • levothyroxine (SYNTHROID) 88 MCG Tab LEVOTHYROXINE SODIUM 88 MCG TABS   • levothyroxine (SYNTHROID) 50 mcg, Oral, EACH MORNING ON EMPTY STOMACH   • lisinopril (PRINIVIL) 5 mg, Oral, DAILY   • loratadine (CLARITIN) 10 mg, Oral, EVERY DAY, TAKE 1 TAB BY MOUTH EVERY DAY.   • meclizine (ANTIVERT) 25 mg, Oral, 3 TIMES DAILY PRN   • Melatonin 3 MG Cap Oral   • naproxen (NAPROSYN) 500 mg, Oral, 2 TIMES DAILY WITH MEALS   • nicotine polacrilex (NICORETTE) 4 MG gum NICOTINE POLACRILEX 4 MG GUM   • olopatadine (PATANOL) 0.1 % ophthalmic solution 1 Drop, Both Eyes, 2 TIMES DAILY   • olopatadine (PATANOL) 0.1 % ophthalmic solution OLOPATADINE HCL 0.1 % SOLN   • Omega-3 Fatty Acids (FISH OIL) 500 MG Cap FISH  MG CAPS   • omeprazole (PRILOSEC) 20 MG delayed-release capsule OMEPRAZOLE 20 MG CPDR   • oxybutynin (DITROPAN) 5 mg, Oral, 3 TIMES DAILY   • paliperidone (INVEGA) 3 mg, Oral   • polyethylene glycol 3350 (MIRALAX) Powder POLYETHYLENE GLYCOL 3350 POWD   • propranolol (INDERAL) 10 mg, Oral, 3 TIMES DAILY   • traZODone (DESYREL) 50 mg, Oral, NIGHTLY       ALLERGIES  No Known Allergies    PHYSICAL EXAM  VITAL SIGNS: /97   Pulse 80   Temp 36.4 °C (97.5 °F) (Temporal)   Resp 14   Ht 1.753 m (5' 9\")   Wt 123 kg (272 lb)   SpO2 100%   BMI 40.17 kg/m²     Constitutional: Patient is well developed, well nourished. Non-toxic appearing. No acute distress.   HENT: Normocephalic, " atraumatic. Moist oral mucosa  Eyes: PERRL, EOMI   Cardiovascular: Normal heart rate and Regular rhythm. No murmur  Thorax & Lungs: No respiratory distress,  Abdomen: Soft,nontender, no rebound , guarding, palpable masses. obese  Skin: Warm, Dry, No contusions or abrasions.   Back: No cervical, thoracic, or lumbosacral tenderness. No CVA tenderness.   Extremities: Peripheral pulses 4/4 No edema, No tenderness   Musculoskeletal: Tenderness in bilateral lateral hips. No contusions or abrasions. Normal range of motion in all major joints. No tenderness to palpation or major deformities noted.   Neurologic: Alert & oriented , Developmentally delayed, Normal motor function, Normal sensory function  Psychiatric: Affect normal, Judgment normal, Mood normal.     DIAGNOSTICS/PROCEDURES    RADIOLOGY/PROCEDURES  DX-PELVIS-1 OR 2 VIEWS   Final Result      1.  No acute fracture or dislocation is identified.        Results and radiologist interpretation reviewed by me.     COURSE & MEDICAL DECISION MAKING  Pertinent Labs & Imaging studies reviewed. (See chart for details)    5:56 PM - Patient seen and evaluated at bedside. Ordered for DX-pelvis to evaluate. Patient will be treated with Toradol 30 mg for her symptoms. Differential diagnoses include, but are not limited to, hip vs back strain    6:46 PM - Reviewed imaging which was reassuring without fracture. Patient is stable for discharge at this time.  Patient improved after Toradol.    The patient will return for new or worsening symptoms and is stable at the time of discharge.    The patient is referred to a primary physician for blood pressure management, diabetic screening, and for all other preventative health concerns.    DISPOSITION:  Patient will be discharged home in stable condition.    FOLLOW UP:  Jac Fuentes, A.P.R.N.  [address] [place] [id number] [phone]    Schedule an appointment as soon as possible for a visit in 1 week  As needed, If symptoms  worsen      OUTPATIENT MEDICATIONS:  New Prescriptions    No medications on file       FINAL IMPRESSION  1. Acute hip pain, bilateral         Jessika SOSA (Scribe), am scribing for, and in the presence of, Camelia Godfrey D.O..    Electronically signed by: Jessika Wren (Scribmichael), 12/2/2021    Camelia SOSA D.O. personally performed the services described in this documentation, as scribed by Jessika Wren in my presence, and it is both accurate and complete.    The note accurately reflects work and decisions made by me.  Camelia Godfrey D.O.  12/3/2021  1:54 AM

## 2021-12-03 NOTE — ED TRIAGE NOTES
"Chief Complaint   Patient presents with   • Hip Pain   • Headache       47 yo female to triage by EMS for above complaint. Patient reports \"I was at work when both of my hips popped out of place at the same time\". Patient also endorses headache. Patient ambulatory with steady gait in triage. No distress noted.    Pt is alert and oriented, speaking in full sentences, follows commands and responds appropriately to questions.     Patient placed back in lobby and educated on triage process. Asked to inform RN of any changes.    /97   Pulse 80   Temp 36.4 °C (97.5 °F) (Temporal)   Resp 14   Ht 1.753 m (5' 9\")   Wt 123 kg (272 lb)   SpO2 100%   BMI 40.17 kg/m²     "

## 2021-12-07 ENCOUNTER — HOSPITAL ENCOUNTER (EMERGENCY)
Facility: MEDICAL CENTER | Age: 46
End: 2021-12-07
Attending: EMERGENCY MEDICINE
Payer: MEDICARE

## 2021-12-07 ENCOUNTER — APPOINTMENT (OUTPATIENT)
Dept: RADIOLOGY | Facility: MEDICAL CENTER | Age: 46
End: 2021-12-07
Attending: EMERGENCY MEDICINE
Payer: MEDICARE

## 2021-12-07 VITALS
SYSTOLIC BLOOD PRESSURE: 139 MMHG | HEART RATE: 82 BPM | WEIGHT: 277.12 LBS | BODY MASS INDEX: 40.92 KG/M2 | RESPIRATION RATE: 16 BRPM | TEMPERATURE: 97.6 F | DIASTOLIC BLOOD PRESSURE: 82 MMHG | OXYGEN SATURATION: 97 %
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
## 2021-12-07 DIAGNOSIS — R55 SYNCOPE, UNSPECIFIED SYNCOPE TYPE: ICD-10-CM

## 2021-12-07 DIAGNOSIS — M25.551 RIGHT HIP PAIN: ICD-10-CM

## 2021-12-07 LAB
ALBUMIN SERPL BCP-MCNC: 4.3 G/DL (ref 3.2–4.9)
ALBUMIN/GLOB SERPL: 2 G/DL
ALP SERPL-CCNC: 69 U/L (ref 30–99)
ALT SERPL-CCNC: 14 U/L (ref 2–50)
ANION GAP SERPL CALC-SCNC: 10 MMOL/L (ref 7–16)
AST SERPL-CCNC: 19 U/L (ref 12–45)
BASOPHILS # BLD AUTO: 1.2 % (ref 0–1.8)
BASOPHILS # BLD: 0.1 K/UL (ref 0–0.12)
BILIRUB SERPL-MCNC: 0.3 MG/DL (ref 0.1–1.5)
BUN SERPL-MCNC: 17 MG/DL (ref 8–22)
CALCIUM SERPL-MCNC: 8.8 MG/DL (ref 8.5–10.5)
CHLORIDE SERPL-SCNC: 105 MMOL/L (ref 96–112)
CO2 SERPL-SCNC: 24 MMOL/L (ref 20–33)
CREAT SERPL-MCNC: 0.72 MG/DL (ref 0.5–1.4)
EKG IMPRESSION: NORMAL
EOSINOPHIL # BLD AUTO: 0.22 K/UL (ref 0–0.51)
EOSINOPHIL NFR BLD: 2.7 % (ref 0–6.9)
ERYTHROCYTE [DISTWIDTH] IN BLOOD BY AUTOMATED COUNT: 45.1 FL (ref 35.9–50)
GLOBULIN SER CALC-MCNC: 2.2 G/DL (ref 1.9–3.5)
GLUCOSE SERPL-MCNC: 85 MG/DL (ref 65–99)
HCT VFR BLD AUTO: 36.9 % (ref 37–47)
HGB BLD-MCNC: 12.3 G/DL (ref 12–16)
IMM GRANULOCYTES # BLD AUTO: 0.03 K/UL (ref 0–0.11)
IMM GRANULOCYTES NFR BLD AUTO: 0.4 % (ref 0–0.9)
LYMPHOCYTES # BLD AUTO: 2.25 K/UL (ref 1–4.8)
LYMPHOCYTES NFR BLD: 27.7 % (ref 22–41)
MAGNESIUM SERPL-MCNC: 2 MG/DL (ref 1.5–2.5)
MCH RBC QN AUTO: 30.8 PG (ref 27–33)
MCHC RBC AUTO-ENTMCNC: 33.3 G/DL (ref 33.6–35)
MCV RBC AUTO: 92.3 FL (ref 81.4–97.8)
MONOCYTES # BLD AUTO: 0.57 K/UL (ref 0–0.85)
MONOCYTES NFR BLD AUTO: 7 % (ref 0–13.4)
NEUTROPHILS # BLD AUTO: 4.94 K/UL (ref 2–7.15)
NEUTROPHILS NFR BLD: 61 % (ref 44–72)
NRBC # BLD AUTO: 0 K/UL
NRBC BLD-RTO: 0 /100 WBC
PLATELET # BLD AUTO: 272 K/UL (ref 164–446)
PMV BLD AUTO: 9 FL (ref 9–12.9)
POTASSIUM SERPL-SCNC: 3.9 MMOL/L (ref 3.6–5.5)
PROT SERPL-MCNC: 6.5 G/DL (ref 6–8.2)
RBC # BLD AUTO: 4 M/UL (ref 4.2–5.4)
SODIUM SERPL-SCNC: 139 MMOL/L (ref 135–145)
TROPONIN T SERPL-MCNC: 6 NG/L (ref 6–19)
WBC # BLD AUTO: 8.1 K/UL (ref 4.8–10.8)

## 2021-12-07 PROCEDURE — 36415 COLL VENOUS BLD VENIPUNCTURE: CPT

## 2021-12-07 PROCEDURE — 84484 ASSAY OF TROPONIN QUANT: CPT

## 2021-12-07 PROCEDURE — 83735 ASSAY OF MAGNESIUM: CPT

## 2021-12-07 PROCEDURE — 99284 EMERGENCY DEPT VISIT MOD MDM: CPT

## 2021-12-07 PROCEDURE — 80053 COMPREHEN METABOLIC PANEL: CPT

## 2021-12-07 PROCEDURE — 85025 COMPLETE CBC W/AUTO DIFF WBC: CPT

## 2021-12-07 PROCEDURE — 93005 ELECTROCARDIOGRAM TRACING: CPT

## 2021-12-07 PROCEDURE — 93005 ELECTROCARDIOGRAM TRACING: CPT | Performed by: EMERGENCY MEDICINE

## 2021-12-07 PROCEDURE — 71045 X-RAY EXAM CHEST 1 VIEW: CPT

## 2021-12-07 ASSESSMENT — FIBROSIS 4 INDEX: FIB4 SCORE: 0.62

## 2021-12-07 NOTE — ED TRIAGE NOTES
Pt ambulotory to triage c/o syncopal episode and falling to her back and side. Now with bilateral hip pain. nad

## 2021-12-08 NOTE — ED NOTES
Up to BR with steady gait, orthostatic BP done.  Seen by ERP  Discharge education provided. Discharge paperwork signed by pt. All questions answered. All belongings with pt. Pt ambulated to lobby unassisted.

## 2021-12-08 NOTE — ED PROVIDER NOTES
ED Provider Note    Scribed for Janeen Mott M.D. by Josue Cortez-Reyes. 12/7/2021, 5:28 PM.    Primary care provider: TWILA Escoto  Means of arrival: Walk-in  History obtained from: Patient  History limited by: None    CHIEF COMPLAINT  Chief Complaint   Patient presents with   • Syncope   • Hip Pain       HPI  Frances Ardon is a 46 y.o. female who presents to the Emergency Department  Following a syncopal episode this morning.  She states that this morning she was walking towards the elevator at [facility] where she lost consciousness around 3 PM today.  She states that she is unsure for how long she was out.  She states that prior to the episode she did experience tunnel vision and a headache.  She denied any palpitations chest pain, shortness of breath.  She thinks she was shaking a bit after.  She did report tongue biting, denied any urinary or fecal incontinence.  She states that she had seizures as an infant however none since.  During the fall she believes she injured her her hip, however does have chronic hip pain and was able to ambulate without assistance.  She states that hip pain is 8 out of 10.  She denies any lower extremity weakness, numbness, tingling. Xray from 12/2 with no signs of fracture or dislocation.     HPI limited by patients intellectual disability.  Per chart review, she recently visited the ED on 11/14 for similar complaints,  Workup at the time was negative for any cardiac or lab abnormalities.     REVIEW OF SYSTEMS  Pertinent positives include none.   Pertinent negatives include no headache, chest pain, palpitations, shortness of breath.  All other systems reviewed and negative.    PAST MEDICAL HISTORY   has a past medical history of Arthritis, Asthma, Dyslipidemia (12/27/2016), GERD (gastroesophageal reflux disease) (12/27/2016), History of seizures (12/27/2016), Hypertension, Hypothyroidism (12/27/2016), Mental retardation, mild (I.Q.  50-70) (12/27/2016), and Tobacco dependence (12/27/2016).    SURGICAL HISTORY   has a past surgical history that includes open reduction.    SOCIAL HISTORY  Social History     Tobacco Use   • Smoking status: Current Every Day Smoker     Packs/day: 1.00     Years: 12.00     Pack years: 12.00     Types: Cigarettes   • Smokeless tobacco: Never Used   Vaping Use   • Vaping Use: Never used   Substance Use Topics   • Alcohol use: Yes     Alcohol/week: 0.0 oz     Comment: Occasionally   • Drug use: No      Social History     Substance and Sexual Activity   Drug Use No       FAMILY HISTORY  Family History   Problem Relation Age of Onset   • Cancer Neg Hx        CURRENT MEDICATIONS  Home Medications     Reviewed by Cindi Bautista R.N. (Registered Nurse) on 12/07/21 at 1420  Med List Status: Partial   Medication Last Dose Status   acetaminophen (TYLENOL) 500 MG Tab  Active   busPIRone (BUSPAR) 10 MG Tab tablet  Active   cephALEXin (KEFLEX) 500 MG Cap  Active   diclofenac sodium (VOLTAREN) 1 % Gel  Active   Diclofenac Sodium 1 % Gel  Active   Disposable Gloves (LATEX GLOVES LARGE) Misc  Active   hydrOXYzine HCl (ATARAX) 50 MG Tab  Active   ibuprofen (MOTRIN) 800 MG Tab  Active   ibuprofen (MOTRIN) 800 MG Tab  Active   lamoTRIgine (LAMICTAL) 100 MG Tab  Active   levothyroxine (SYNTHROID) 50 MCG Tab  Active   levothyroxine (SYNTHROID) 88 MCG Tab  Active   lisinopril (PRINIVIL) 5 MG Tab  Active   loratadine (CLARITIN) 10 MG Tab  Active   meclizine (ANTIVERT) 25 MG Tab  Active   Melatonin 3 MG Cap  Active   naproxen (NAPROSYN) 500 MG Tab  Active   nicotine polacrilex (NICORETTE) 4 MG gum  Active   olopatadine (PATANOL) 0.1 % ophthalmic solution  Active   olopatadine (PATANOL) 0.1 % ophthalmic solution  Active   Omega-3 Fatty Acids (FISH OIL) 500 MG Cap  Active   omeprazole (PRILOSEC) 20 MG delayed-release capsule  Active   oxybutynin (DITROPAN) 5 MG Tab  Active   paliperidone (INVEGA) 3 MG ER tablet  Active   polyethylene  glycol 3350 (MIRALAX) Powder  Active   propranolol (INDERAL) 10 MG Tab  Active   traZODone (DESYREL) 50 MG Tab  Active                ALLERGIES  No Known Allergies    PHYSICAL EXAM  VITAL SIGNS: BP (!) 163/91   Pulse (!) 58   Temp 36.7 °C (98.1 °F) (Temporal)   Resp 13   Wt (!) 126 kg (277 lb 1.9 oz)   SpO2 98%   BMI 40.92 kg/m²     Constitutional: Alert in no apparent distress.  HENT: No signs of trauma, Bilateral external ears normal, Nose normal.   Eyes: Pupils are equal and reactive, Conjunctiva normal, Non-icteric.   Neck: Normal range of motion, No tenderness, Supple, No stridor.   Cardiovascular: Regular rate and rhythm, no murmurs.   Thorax & Lungs: Normal breath sounds, No respiratory distress, No wheezing, No chest tenderness.   Abdomen: Bowel sounds normal, Soft, No tenderness, No masses, No peritoneal signs.  Skin: Warm, Dry, No erythema, No rash.   Back: No bony tenderness, No CVA tenderness.  Musculoskeletal:  No major deformities noted.  Mild hip pain with external rotation of right hip  Neurologic: Alert, moving all extremities without difficulty, 5 out of 5 strength in all 4 extremities, sensation intact in all 4 extremities, decreased sensation along left side of face, no facial droop, EOMI, PERRL, no tongue deviation, no aphasia, no dysarthria, no dysmetria    LABS  Labs Reviewed   CBC WITH DIFFERENTIAL - Abnormal; Notable for the following components:       Result Value    RBC 4.00 (*)     Hematocrit 36.9 (*)     MCHC 33.3 (*)     All other components within normal limits    Narrative:     Collected By:   COMP METABOLIC PANEL    Narrative:     Collected By:   TROPONIN    Narrative:     Collected By:   MAGNESIUM    Narrative:     Collected By:   ESTIMATED GFR    Narrative:     Collected By:     All labs reviewed by me.    EKG  12 Lead EKG interpreted by me to show:  Results for orders placed or performed during the hospital encounter of 12/07/21   EKG   Result Value Ref Range    Report        [facility] Emergency Dept.    Test Date:  2021  Pt Name:    MORALES GOODEN             Department: ER  MRN:        [id number]                      Room:  Gender:     Female                       Technician: HRR  :        1975                   Requested By:ER TRIAGE PROTOCOL  Order #:    [id number]                    Reading MD: JENNIFER KOO    Measurements  Intervals                                Axis  Rate:       62                           P:          47  GA:         172                          QRS:        32  QRSD:       102                          T:          25  QT:         404  QTc:        411    Interpretive Statements  SINUS RHYTHM  BASELINE WANDER IN LEAD(S) V1,V2  Compared to ECG 10/22/2021 00:58:01  Sinus bradycardia no longer present  Ventricular preexcitation no longer present  IMpression: sinus rhythm without ischemia  Electronically Signed On 2021 18:09:15 PST by JENNIFER KOO           RADIOLOGY  DX-CHEST-PORTABLE (1 VIEW)   Final Result      Mild cardiomegaly.        The radiologist's interpretation of all radiological studies have been reviewed by me.    COURSE & MEDICAL DECISION MAKING  Pertinent Labs & Imaging studies reviewed. (See chart for details)    I reviewed the patient's past medical records which showed that the has been seen multiple times in the ED for evaluation of light headedness, dizziness, and ground level fall over the last two months. She has had multiple extensive workups which have been negative including a non contrast CT-Head on . . She was seen here in the ED 5 days ago for hip pain, at [facility] on  for an ankle sprain, and at [facility] on  for headache, dizziness, and ground level fall.     Differential diagnoses include but are not limited to: orthostatic syncope, vasovagal syncope, seizure, autonomic dysfunction, arrhythmia.     5:28 PM - Patient seen and examined at bedside. Ordered  EKG, CMP, CBC, CXR, orthostatics to  evaluate her symptoms.  EKG revealed sinus bradycardia, no electrolyte abnormalities, leukocytosis, or anemia on CBC.  Chest x-ray negative for any acute cardiopulmonary process.    7:58 PM - I reevaluated the patient at bedside. I updated her on her lab and imaging results noted above. I discussed plan for discharge and follow up as outlined below. The patient verbalizes they feel comfortable going home. The patient is stable for discharge at this time and will return for any new or worsening symptoms. Patient verbalizes understanding and support with my plan for discharge. Review of vital signs at this visit show: BP (!) 182/81   Pulse 68   Temp 36.7 °C (98.1 °F) (Temporal)   Resp 16   Wt (!) 126 kg (277 lb 1.9 oz)   SpO2 98%   BMI 40.92 kg/m²       Decision Making:  This is a 46 y.o. year old female who presents with syncope and hip pain.  Systolic blood pressure admission fluctuated between 120s to 180s, heart rate remained stable in the 60s.  EKG with no ST abnormalities or signs of heart block.  Labs with no electrolyte abnormalities, anemia, leukocytosis.  Patient did not report any head trauma and therefore CT head was not indicated at this time.  Patient did report right-sided hip pain, however this is chronic in nature and recent pelvic x-ray revealed no acute fracture or dislocation.  Patient was able to mobilize at the hip and ambulate without any trouble.  Her syncopal episode may be secondary to fluctuating blood pressures or dehydration versus vasovagal syncope.  At this time she is stable enough for further work-up to be continued on outpatient basis.  She is to follow-up with her PCP on an outpatient basis     The patient will return for new or worsening symptoms and is stable at the time of discharge. Patient was given return precautions. Patient and/or family member verbalizes understanding and will comply.    DISPOSITION:  Patient will be discharged home in stable condition.    FOLLOW  UP:  Jac RING toñito, A.P.R.N.  [address]  [address]  [facility] [id number] [phone]      As needed    [facility], Emergency Dept  [address] [address] [phone]    Return to the emergency department for worsening lightheadedness dizziness or other concerns.      FINAL IMPRESSION  1. Syncope, unspecified syncope type    2. Fall, initial encounter    3. Right hip pain           This dictation has been created using voice recognition software and/or scribes. The accuracy of the dictation is limited by the abilities of the software and the expertise of the scribes. I expect there may be some errors of grammar and possibly content. I made every attempt to manually correct the errors within my dictation. However, errors related to voice recognition software and/or scribes may still exist and should be interpreted within the appropriate context.     Louann SOSA,PGY2 evaluated this patient under the supervision of Dr. Janeen Mott M.D..    Janeen SOSA M.D. personally performed the services described in this documentation, as scribed by Josue Cortez-Reyes in my presence, and it is both accurate and complete.

## 2022-01-04 ENCOUNTER — HOSPITAL ENCOUNTER (OUTPATIENT)
Facility: MEDICAL CENTER | Age: 47
End: 2022-01-04
Attending: OBSTETRICS & GYNECOLOGY
Payer: MEDICARE

## 2022-01-04 ENCOUNTER — GYNECOLOGY VISIT (OUTPATIENT)
Dept: OBGYN | Facility: CLINIC | Age: 47
End: 2022-01-04
Payer: MEDICARE

## 2022-01-04 VITALS — SYSTOLIC BLOOD PRESSURE: 123 MMHG | WEIGHT: 281 LBS | DIASTOLIC BLOOD PRESSURE: 77 MMHG | BODY MASS INDEX: 41.5 KG/M2

## 2022-01-04 DIAGNOSIS — Z01.419 WOMEN'S ANNUAL ROUTINE GYNECOLOGICAL EXAMINATION: Primary | ICD-10-CM

## 2022-01-04 DIAGNOSIS — E66.01 MORBID OBESITY (HCC): ICD-10-CM

## 2022-01-04 DIAGNOSIS — R30.0 DYSURIA: ICD-10-CM

## 2022-01-04 DIAGNOSIS — Z12.31 SCREENING MAMMOGRAM FOR BREAST CANCER: ICD-10-CM

## 2022-01-04 DIAGNOSIS — Z12.4 SCREENING FOR MALIGNANT NEOPLASM OF CERVIX: ICD-10-CM

## 2022-01-04 DIAGNOSIS — Z11.3 SCREEN FOR STD (SEXUALLY TRANSMITTED DISEASE): ICD-10-CM

## 2022-01-04 LAB
APPEARANCE UR: NORMAL
BILIRUB UR STRIP-MCNC: NORMAL MG/DL
COLOR UR AUTO: NORMAL
GLUCOSE UR STRIP.AUTO-MCNC: NEGATIVE MG/DL
INT CON NEG: NEGATIVE
INT CON POS: POSITIVE
KETONES UR STRIP.AUTO-MCNC: NEGATIVE MG/DL
LEUKOCYTE ESTERASE UR QL STRIP.AUTO: NEGATIVE
NITRITE UR QL STRIP.AUTO: NEGATIVE
PH UR STRIP.AUTO: 6 [PH] (ref 5–8)
POC URINE PREGNANCY TEST: NEGATIVE
PROT UR QL STRIP: NEGATIVE MG/DL
RBC UR QL AUTO: NORMAL
SP GR UR STRIP.AUTO: >=1.03
UROBILINOGEN UR STRIP-MCNC: NORMAL MG/DL

## 2022-01-04 PROCEDURE — 81025 URINE PREGNANCY TEST: CPT | Performed by: OBSTETRICS & GYNECOLOGY

## 2022-01-04 PROCEDURE — 87591 N.GONORRHOEAE DNA AMP PROB: CPT

## 2022-01-04 PROCEDURE — G0101 CA SCREEN;PELVIC/BREAST EXAM: HCPCS | Performed by: OBSTETRICS & GYNECOLOGY

## 2022-01-04 PROCEDURE — 87624 HPV HI-RISK TYP POOLED RSLT: CPT

## 2022-01-04 PROCEDURE — 81002 URINALYSIS NONAUTO W/O SCOPE: CPT | Performed by: OBSTETRICS & GYNECOLOGY

## 2022-01-04 PROCEDURE — 88175 CYTOPATH C/V AUTO FLUID REDO: CPT

## 2022-01-04 PROCEDURE — 87491 CHLMYD TRACH DNA AMP PROBE: CPT

## 2022-01-04 ASSESSMENT — FIBROSIS 4 INDEX: FIB4 SCORE: 0.88

## 2022-01-04 NOTE — NON-PROVIDER
Pt here annual exam  Pt states periods sometimes are irregular, heavy/ light   Good# [phone]   Pharmacy verified

## 2022-01-05 NOTE — H&P (VIEW-ONLY)
"ANNUAL GYNECOLOGY VISIT    Chief Complaint  Annual Exam (pap smear )      Subjective  Frances Ardon is a 46 y.o. female who presents today for Annual Exam.  We are also in process of scheduling her for a Laparoscopic bilateral salpingectomy for permanent sterilization.  Patient is a poor historian and has mild MR.  She reports being sexually active but states the \"broke up.\"  I am unclear if this was recent or not.  States she skipped a period in December but has skipped one in the past.  Reports some hot flashes, denies night sweats.  Is otherwise non-informative when it comes to her menses.      Preventive Care   Immunization History   Administered Date(s) Administered   • Influenza (IM) Preservative Free - HISTORICAL DATA 2010, 10/06/2011, 10/05/2012, 10/29/2019   • Influenza Vaccine H1N1 - HISTORICAL DATA 2009   • Influenza Vaccine Pediatric Split - Historical Data 2009   • Influenza Vaccine Quad Inj (Pf) 10/05/2012, 2017   • Influenza Vaccine Quad Inj (Preserved) 2016, 2016   • Tdap Vaccine 10/11/2009     Last Mammogram: 2019    Gynecology History and ROS  Current Sexual Activity: Yes  History of sexually transmitted diseases?  no  Abnormal discharge? No  Current Contraception:  none    Menstrual History  Patient's last menstrual period was 2022. - pt not currently bleeding so unsure if this is accurate information      Pap History  Last pap smear: ?  History of moderate or severe dysplasia: No    Cancer Risk Assessement:  Family history of:   - Breast cancer: no   - Ovarian cancer: no   - Uterine cancer: no   - Colon cancer: no    Obstetric History  OB History    Para Term  AB Living   0 0 0 0 0 0   SAB IAB Ectopic Molar Multiple Live Births   0 0 0   0         Past Medical History  Past Medical History:   Diagnosis Date   • Arthritis    • Asthma    • Dyslipidemia 2016   • GERD (gastroesophageal reflux disease) 2016   • History of " seizures 12/27/2016   • Hypertension    • Hypothyroidism 12/27/2016   • Mental retardation, mild (I.Q. 50-70) 12/27/2016   • Tobacco dependence 12/27/2016       Past Surgical History  Past Surgical History:   Procedure Laterality Date   • OPEN REDUCTION      left foot       Social History  Social History     Socioeconomic History   • Marital status: Single     Spouse name: Not on file   • Number of children: Not on file   • Years of education: Not on file   • Highest education level: Not on file   Occupational History   • Not on file   Tobacco Use   • Smoking status: Current Every Day Smoker     Packs/day: 1.00     Years: 12.00     Pack years: 12.00     Types: Cigarettes   • Smokeless tobacco: Never Used   Vaping Use   • Vaping Use: Never used   Substance and Sexual Activity   • Alcohol use: Yes     Alcohol/week: 0.0 oz     Comment: Occasionally   • Drug use: No   • Sexual activity: Not Currently     Partners: Male   Other Topics Concern   • Not on file   Social History Narrative   • Not on file     Social Determinants of Health     Financial Resource Strain:    • Difficulty of Paying Living Expenses: Not on file   Food Insecurity:    • Worried About Running Out of Food in the Last Year: Not on file   • Ran Out of Food in the Last Year: Not on file   Transportation Needs:    • Lack of Transportation (Medical): Not on file   • Lack of Transportation (Non-Medical): Not on file   Physical Activity:    • Days of Exercise per Week: Not on file   • Minutes of Exercise per Session: Not on file   Stress:    • Feeling of Stress : Not on file   Social Connections:    • Frequency of Communication with Friends and Family: Not on file   • Frequency of Social Gatherings with Friends and Family: Not on file   • Attends [employer] Services: Not on file   • Active Member of Clubs or Organizations: Not on file   • Attends Club or Organization Meetings: Not on file   • Marital Status: Not on file   Intimate Partner Violence:    • Fear  of Current or Ex-Partner: Not on file   • Emotionally Abused: Not on file   • Physically Abused: Not on file   • Sexually Abused: Not on file   Housing Stability:    • Unable to Pay for Housing in the Last Year: Not on file   • Number of Places Lived in the Last Year: Not on file   • Unstable Housing in the Last Year: Not on file       Family History  Family History   Problem Relation Age of Onset   • Cancer Neg Hx        Home Medications  Current Outpatient Medications on File Prior to Visit   Medication Sig Dispense Refill   • meclizine (ANTIVERT) 25 MG Tab Take 1 Tablet by mouth 3 times a day as needed. 30 Tablet 0   • busPIRone (BUSPAR) 10 MG Tab tablet Take 10 mg by mouth 2 times a day.     • hydrOXYzine HCl (ATARAX) 50 MG Tab Take 50 mg by mouth 3 times a day as needed for Itching.     • lamoTRIgine (LAMICTAL) 100 MG Tab Take 100 mg by mouth every day.     • propranolol (INDERAL) 10 MG Tab Take 10 mg by mouth 3 times a day.     • traZODone (DESYREL) 50 MG Tab Take 50 mg by mouth every evening.     • lisinopril (PRINIVIL) 5 MG Tab Take 5 mg by mouth every day.     • Melatonin 3 MG Cap Take  by mouth.     • oxybutynin (DITROPAN) 5 MG Tab Take 5 mg by mouth 3 times a day.     • cephALEXin (KEFLEX) 500 MG Cap KEFLEX 500 MG CAPS     • diclofenac sodium (VOLTAREN) 1 % Gel VOLTAREN 1 % GEL     • levothyroxine (SYNTHROID) 88 MCG Tab LEVOTHYROXINE SODIUM 88 MCG TABS     • ibuprofen (MOTRIN) 800 MG Tab Take 1 Tablet by mouth every 8 hours as needed. 30 Tablet 0   • acetaminophen (TYLENOL) 500 MG Tab ACETAMINOPHEN 500 MG TABS     • Disposable Gloves (LATEX GLOVES LARGE) Misc LATEX GLOVES LARGE     • nicotine polacrilex (NICORETTE) 4 MG gum NICOTINE POLACRILEX 4 MG GUM     • polyethylene glycol 3350 (MIRALAX) Powder POLYETHYLENE GLYCOL 3350 POWD     • olopatadine (PATANOL) 0.1 % ophthalmic solution OLOPATADINE HCL 0.1 % SOLN     • omeprazole (PRILOSEC) 20 MG delayed-release capsule OMEPRAZOLE 20 MG CPDR     • naproxen  (NAPROSYN) 500 MG Tab Take 500 mg by mouth 2 times a day, with meals.     • Omega-3 Fatty Acids (FISH OIL) 500 MG Cap FISH  MG CAPS     • ibuprofen (MOTRIN) 800 MG Tab IBUPROFEN 800 MG TABS     • Diclofenac Sodium 1 % Gel Apply 1 Application to affected area(s) as needed.     • levothyroxine (SYNTHROID) 50 MCG Tab Take 1 Tab by mouth Every morning on an empty stomach. 90 Tab 0   • loratadine (CLARITIN) 10 MG Tab Take 10 mg by mouth every day. TAKE 1 TAB BY MOUTH EVERY DAY.  0   • olopatadine (PATANOL) 0.1 % ophthalmic solution Place 1 Drop in both eyes 2 times a day.     • paliperidone (INVEGA) 3 MG ER tablet Take 3 mg by mouth.       No current facility-administered medications on file prior to visit.       Allergies/Reactions  No Known Allergies    ROS  Positive ROS: dysuria  Gen: no fevers or chills, no significant weight loss or gain, excessive fatigue  Respiratory:  no cough or dyspnea  Cardiac:  no chest pain, no palpitations, no syncope  Breast: no breast discharge, pain, lump or skin changes  GI:  no heartburn, no abdominal pain, no nausea or vomiting  Urinary: no dysuria, urgency, frequency, incontinence   Psych: no depression or anxiety  Neuro: no migraines with aura, fainting spells, numbness or tingling  Extremities: no joint pain, persistently swollen ankles, recurrent leg cramps      Physical Examination:  Vital Signs:   Vitals:    01/04/22 1526   BP: 123/77   BP Location: Right arm   Patient Position: Sitting   BP Cuff Size: Adult   Weight: (!) 127 kg (281 lb)     Body mass index is 41.5 kg/m².    Constitutional: The patient is well developed and well nourished.  Psychiatric: Patient is oriented to time place and person.   Skin: No rash observed.  Neck: Appears symmetric. Thyroid normal size  Respiratory: normal effort  Breast: Inspection reveals no asymetry or nipple discharge, no skin thickening, dimpling or erythema.  Palpation demonstrates no masses.  Abdomen: Soft, non-tender.  Pelvic  Exam:     Vulva: external female genitalia are normal in appearance. No lesions    Urethra - no lesions, no erythema    Vagina: moist, pink, normal ruggae    Cervix: pink, smooth, no lesions, no CMT    Uterus - non-tender, exam limited due to body habitus    Ovaries: non-tender, no appreciable masses; exam limited due to habitus  Pap Smear performed: Yes  Extremeties: Legs are symmetric and without tenderness. There is no edema present.    Labs/Imaging:  HDL (mg/dL)   Date Value   04/09/2019 38 (A)     LDL (mg/dL)   Date Value   04/09/2019 151 (H)     No components found for: A1C1  WBC (K/uL)   Date Value   12/07/2021 8.1     Lab Results   Component Value Date/Time    CO2 24 12/07/2021 1820    BUN 17 12/07/2021 1820     [unfilled]      Assessment & Plan  Frances Ardon is a 46 y.o. female who presents today for Annual Gyn Exam.     1. Health Maintenance   PAP with cotest and GC/CT screening given pt unclear sexual history  MAMMOGRAM: ordered    2. Desires permanent sterilization   - reviewed that pt does want to proceed with permanent sterilization   - reviewed surgery procedure and will get scheduled in the next few weeks   - R/B/A discussed    3. Missed period   - likely perimenopausal changes to menses.   - UPT neg    4. Dysuria   - UA neg    Return: Annually and postop    Kimmy Dolan D.O.

## 2022-01-05 NOTE — PROGRESS NOTES
"ANNUAL GYNECOLOGY VISIT    Chief Complaint  Annual Exam (pap smear )      Subjective  Frances Ardon is a 46 y.o. female who presents today for Annual Exam.  We are also in process of scheduling her for a Laparoscopic bilateral salpingectomy for permanent sterilization.  Patient is a poor historian and has mild MR.  She reports being sexually active but states the \"broke up.\"  I am unclear if this was recent or not.  States she skipped a period in December but has skipped one in the past.  Reports some hot flashes, denies night sweats.  Is otherwise non-informative when it comes to her menses.      Preventive Care   Immunization History   Administered Date(s) Administered   • Influenza (IM) Preservative Free - HISTORICAL DATA 2010, 10/06/2011, 10/05/2012, 10/29/2019   • Influenza Vaccine H1N1 - HISTORICAL DATA 2009   • Influenza Vaccine Pediatric Split - Historical Data 2009   • Influenza Vaccine Quad Inj (Pf) 10/05/2012, 2017   • Influenza Vaccine Quad Inj (Preserved) 2016, 2016   • Tdap Vaccine 10/11/2009     Last Mammogram: 2019    Gynecology History and ROS  Current Sexual Activity: Yes  History of sexually transmitted diseases?  no  Abnormal discharge? No  Current Contraception:  none    Menstrual History  Patient's last menstrual period was 2022. - pt not currently bleeding so unsure if this is accurate information      Pap History  Last pap smear: ?  History of moderate or severe dysplasia: No    Cancer Risk Assessement:  Family history of:   - Breast cancer: no   - Ovarian cancer: no   - Uterine cancer: no   - Colon cancer: no    Obstetric History  OB History    Para Term  AB Living   0 0 0 0 0 0   SAB IAB Ectopic Molar Multiple Live Births   0 0 0   0         Past Medical History  Past Medical History:   Diagnosis Date   • Arthritis    • Asthma    • Dyslipidemia 2016   • GERD (gastroesophageal reflux disease) 2016   • History of " seizures 12/27/2016   • Hypertension    • Hypothyroidism 12/27/2016   • Mental retardation, mild (I.Q. 50-70) 12/27/2016   • Tobacco dependence 12/27/2016       Past Surgical History  Past Surgical History:   Procedure Laterality Date   • OPEN REDUCTION      left foot       Social History  Social History     Socioeconomic History   • Marital status: Single     Spouse name: Not on file   • Number of children: Not on file   • Years of education: Not on file   • Highest education level: Not on file   Occupational History   • Not on file   Tobacco Use   • Smoking status: Current Every Day Smoker     Packs/day: 1.00     Years: 12.00     Pack years: 12.00     Types: Cigarettes   • Smokeless tobacco: Never Used   Vaping Use   • Vaping Use: Never used   Substance and Sexual Activity   • Alcohol use: Yes     Alcohol/week: 0.0 oz     Comment: Occasionally   • Drug use: No   • Sexual activity: Not Currently     Partners: Male   Other Topics Concern   • Not on file   Social History Narrative   • Not on file     Social Determinants of Health     Financial Resource Strain:    • Difficulty of Paying Living Expenses: Not on file   Food Insecurity:    • Worried About Running Out of Food in the Last Year: Not on file   • Ran Out of Food in the Last Year: Not on file   Transportation Needs:    • Lack of Transportation (Medical): Not on file   • Lack of Transportation (Non-Medical): Not on file   Physical Activity:    • Days of Exercise per Week: Not on file   • Minutes of Exercise per Session: Not on file   Stress:    • Feeling of Stress : Not on file   Social Connections:    • Frequency of Communication with Friends and Family: Not on file   • Frequency of Social Gatherings with Friends and Family: Not on file   • Attends Sikh Services: Not on file   • Active Member of Clubs or Organizations: Not on file   • Attends Club or Organization Meetings: Not on file   • Marital Status: Not on file   Intimate Partner Violence:    • Fear  of Current or Ex-Partner: Not on file   • Emotionally Abused: Not on file   • Physically Abused: Not on file   • Sexually Abused: Not on file   Housing Stability:    • Unable to Pay for Housing in the Last Year: Not on file   • Number of Places Lived in the Last Year: Not on file   • Unstable Housing in the Last Year: Not on file       Family History  Family History   Problem Relation Age of Onset   • Cancer Neg Hx        Home Medications  Current Outpatient Medications on File Prior to Visit   Medication Sig Dispense Refill   • meclizine (ANTIVERT) 25 MG Tab Take 1 Tablet by mouth 3 times a day as needed. 30 Tablet 0   • busPIRone (BUSPAR) 10 MG Tab tablet Take 10 mg by mouth 2 times a day.     • hydrOXYzine HCl (ATARAX) 50 MG Tab Take 50 mg by mouth 3 times a day as needed for Itching.     • lamoTRIgine (LAMICTAL) 100 MG Tab Take 100 mg by mouth every day.     • propranolol (INDERAL) 10 MG Tab Take 10 mg by mouth 3 times a day.     • traZODone (DESYREL) 50 MG Tab Take 50 mg by mouth every evening.     • lisinopril (PRINIVIL) 5 MG Tab Take 5 mg by mouth every day.     • Melatonin 3 MG Cap Take  by mouth.     • oxybutynin (DITROPAN) 5 MG Tab Take 5 mg by mouth 3 times a day.     • cephALEXin (KEFLEX) 500 MG Cap KEFLEX 500 MG CAPS     • diclofenac sodium (VOLTAREN) 1 % Gel VOLTAREN 1 % GEL     • levothyroxine (SYNTHROID) 88 MCG Tab LEVOTHYROXINE SODIUM 88 MCG TABS     • ibuprofen (MOTRIN) 800 MG Tab Take 1 Tablet by mouth every 8 hours as needed. 30 Tablet 0   • acetaminophen (TYLENOL) 500 MG Tab ACETAMINOPHEN 500 MG TABS     • Disposable Gloves (LATEX GLOVES LARGE) Misc LATEX GLOVES LARGE     • nicotine polacrilex (NICORETTE) 4 MG gum NICOTINE POLACRILEX 4 MG GUM     • polyethylene glycol 3350 (MIRALAX) Powder POLYETHYLENE GLYCOL 3350 POWD     • olopatadine (PATANOL) 0.1 % ophthalmic solution OLOPATADINE HCL 0.1 % SOLN     • omeprazole (PRILOSEC) 20 MG delayed-release capsule OMEPRAZOLE 20 MG CPDR     • naproxen  (NAPROSYN) 500 MG Tab Take 500 mg by mouth 2 times a day, with meals.     • Omega-3 Fatty Acids (FISH OIL) 500 MG Cap FISH  MG CAPS     • ibuprofen (MOTRIN) 800 MG Tab IBUPROFEN 800 MG TABS     • Diclofenac Sodium 1 % Gel Apply 1 Application to affected area(s) as needed.     • levothyroxine (SYNTHROID) 50 MCG Tab Take 1 Tab by mouth Every morning on an empty stomach. 90 Tab 0   • loratadine (CLARITIN) 10 MG Tab Take 10 mg by mouth every day. TAKE 1 TAB BY MOUTH EVERY DAY.  0   • olopatadine (PATANOL) 0.1 % ophthalmic solution Place 1 Drop in both eyes 2 times a day.     • paliperidone (INVEGA) 3 MG ER tablet Take 3 mg by mouth.       No current facility-administered medications on file prior to visit.       Allergies/Reactions  No Known Allergies    ROS  Positive ROS: dysuria  Gen: no fevers or chills, no significant weight loss or gain, excessive fatigue  Respiratory:  no cough or dyspnea  Cardiac:  no chest pain, no palpitations, no syncope  Breast: no breast discharge, pain, lump or skin changes  GI:  no heartburn, no abdominal pain, no nausea or vomiting  Urinary: no dysuria, urgency, frequency, incontinence   Psych: no depression or anxiety  Neuro: no migraines with aura, fainting spells, numbness or tingling  Extremities: no joint pain, persistently swollen ankles, recurrent leg cramps      Physical Examination:  Vital Signs:   Vitals:    01/04/22 1526   BP: 123/77   BP Location: Right arm   Patient Position: Sitting   BP Cuff Size: Adult   Weight: (!) 127 kg (281 lb)     Body mass index is 41.5 kg/m².    Constitutional: The patient is well developed and well nourished.  Psychiatric: Patient is oriented to time place and person.   Skin: No rash observed.  Neck: Appears symmetric. Thyroid normal size  Respiratory: normal effort  Breast: Inspection reveals no asymetry or nipple discharge, no skin thickening, dimpling or erythema.  Palpation demonstrates no masses.  Abdomen: Soft, non-tender.  Pelvic  Exam:     Vulva: external female genitalia are normal in appearance. No lesions    Urethra - no lesions, no erythema    Vagina: moist, pink, normal ruggae    Cervix: pink, smooth, no lesions, no CMT    Uterus - non-tender, exam limited due to body habitus    Ovaries: non-tender, no appreciable masses; exam limited due to habitus  Pap Smear performed: Yes  Extremeties: Legs are symmetric and without tenderness. There is no edema present.    Labs/Imaging:  HDL (mg/dL)   Date Value   04/09/2019 38 (A)     LDL (mg/dL)   Date Value   04/09/2019 151 (H)     No components found for: A1C1  WBC (K/uL)   Date Value   12/07/2021 8.1     Lab Results   Component Value Date/Time    CO2 24 12/07/2021 1820    BUN 17 12/07/2021 1820     [unfilled]      Assessment & Plan  Frances Ardon is a 46 y.o. female who presents today for Annual Gyn Exam.     1. Health Maintenance   PAP with cotest and GC/CT screening given pt unclear sexual history  MAMMOGRAM: ordered    2. Desires permanent sterilization   - reviewed that pt does want to proceed with permanent sterilization   - reviewed surgery procedure and will get scheduled in the next few weeks   - R/B/A discussed    3. Missed period   - likely perimenopausal changes to menses.   - UPT neg    4. Dysuria   - UA neg    Return: Annually and postop    Kimmy Dolan D.O.

## 2022-01-07 DIAGNOSIS — Z12.4 SCREENING FOR MALIGNANT NEOPLASM OF CERVIX: ICD-10-CM

## 2022-01-07 DIAGNOSIS — Z11.3 SCREEN FOR STD (SEXUALLY TRANSMITTED DISEASE): ICD-10-CM

## 2022-01-10 ENCOUNTER — TELEPHONE (OUTPATIENT)
Dept: OBGYN | Facility: CLINIC | Age: 47
End: 2022-01-10

## 2022-01-10 NOTE — TELEPHONE ENCOUNTER
Patients care taker called in regards to her pre op needed before her surgery on 1/12/2022. Advised with our surgery scheduler, relayed that she will be receiving a phone call for all her pre op needs. She asked if her medications are safe to take. Relayed that patient should steer clear from NSAID 7 days prior, and her regular medications are ok with a small sip of water before surgery. Verbalized understanding and had no further questions.

## 2022-01-11 ENCOUNTER — APPOINTMENT (OUTPATIENT)
Dept: ADMISSIONS | Facility: MEDICAL CENTER | Age: 47
End: 2022-01-11
Attending: OBSTETRICS & GYNECOLOGY
Payer: MEDICARE

## 2022-01-12 ENCOUNTER — HOSPITAL ENCOUNTER (OUTPATIENT)
Facility: MEDICAL CENTER | Age: 47
End: 2022-01-12
Attending: OBSTETRICS & GYNECOLOGY | Admitting: OBSTETRICS & GYNECOLOGY
Payer: MEDICARE

## 2022-01-12 ENCOUNTER — ANESTHESIA EVENT (OUTPATIENT)
Dept: SURGERY | Facility: MEDICAL CENTER | Age: 47
End: 2022-01-12
Payer: MEDICARE

## 2022-01-12 ENCOUNTER — ANESTHESIA (OUTPATIENT)
Dept: SURGERY | Facility: MEDICAL CENTER | Age: 47
End: 2022-01-12
Payer: MEDICARE

## 2022-01-12 VITALS
RESPIRATION RATE: 20 BRPM | HEART RATE: 90 BPM | OXYGEN SATURATION: 93 % | TEMPERATURE: 97.5 F | BODY MASS INDEX: 41.47 KG/M2 | DIASTOLIC BLOOD PRESSURE: 79 MMHG | SYSTOLIC BLOOD PRESSURE: 123 MMHG | WEIGHT: 279.98 LBS | HEIGHT: 69 IN

## 2022-01-12 DIAGNOSIS — G89.18 POSTOPERATIVE PAIN: ICD-10-CM

## 2022-01-12 LAB
ANION GAP SERPL CALC-SCNC: 12 MMOL/L (ref 7–16)
BUN SERPL-MCNC: 13 MG/DL (ref 8–22)
CALCIUM SERPL-MCNC: 9.1 MG/DL (ref 8.5–10.5)
CHLORIDE SERPL-SCNC: 105 MMOL/L (ref 96–112)
CO2 SERPL-SCNC: 22 MMOL/L (ref 20–33)
CREAT SERPL-MCNC: 0.67 MG/DL (ref 0.5–1.4)
ERYTHROCYTE [DISTWIDTH] IN BLOOD BY AUTOMATED COUNT: 46.1 FL (ref 35.9–50)
EXTERNAL QUALITY CONTROL: NORMAL
GLUCOSE SERPL-MCNC: 117 MG/DL (ref 65–99)
HCG SERPL QL: NEGATIVE
HCT VFR BLD AUTO: 38.3 % (ref 37–47)
HGB BLD-MCNC: 13.1 G/DL (ref 12–16)
MCH RBC QN AUTO: 30.8 PG (ref 27–33)
MCHC RBC AUTO-ENTMCNC: 34.2 G/DL (ref 33.6–35)
MCV RBC AUTO: 89.9 FL (ref 81.4–97.8)
PATHOLOGY CONSULT NOTE: NORMAL
PLATELET # BLD AUTO: 263 K/UL (ref 164–446)
PMV BLD AUTO: 9 FL (ref 9–12.9)
POTASSIUM SERPL-SCNC: 4.7 MMOL/L (ref 3.6–5.5)
RBC # BLD AUTO: 4.26 M/UL (ref 4.2–5.4)
SARS-COV+SARS-COV-2 AG RESP QL IA.RAPID: NEGATIVE
SODIUM SERPL-SCNC: 139 MMOL/L (ref 135–145)
WBC # BLD AUTO: 8.2 K/UL (ref 4.8–10.8)

## 2022-01-12 PROCEDURE — 700101 HCHG RX REV CODE 250: Performed by: OBSTETRICS & GYNECOLOGY

## 2022-01-12 PROCEDURE — 160002 HCHG RECOVERY MINUTES (STAT): Performed by: OBSTETRICS & GYNECOLOGY

## 2022-01-12 PROCEDURE — A9270 NON-COVERED ITEM OR SERVICE: HCPCS | Performed by: ANESTHESIOLOGY

## 2022-01-12 PROCEDURE — 700111 HCHG RX REV CODE 636 W/ 250 OVERRIDE (IP)

## 2022-01-12 PROCEDURE — 700105 HCHG RX REV CODE 258: Performed by: OBSTETRICS & GYNECOLOGY

## 2022-01-12 PROCEDURE — 500886 HCHG PACK, LAPAROSCOPY: Performed by: OBSTETRICS & GYNECOLOGY

## 2022-01-12 PROCEDURE — 500868 HCHG NEEDLE, SURGI(VARES): Performed by: OBSTETRICS & GYNECOLOGY

## 2022-01-12 PROCEDURE — 160028 HCHG SURGERY MINUTES - 1ST 30 MINS LEVEL 3: Performed by: OBSTETRICS & GYNECOLOGY

## 2022-01-12 PROCEDURE — 160036 HCHG PACU - EA ADDL 30 MINS PHASE I: Performed by: OBSTETRICS & GYNECOLOGY

## 2022-01-12 PROCEDURE — 160047 HCHG PACU  - EA ADDL 30 MINS PHASE II: Performed by: OBSTETRICS & GYNECOLOGY

## 2022-01-12 PROCEDURE — 501570 HCHG TROCAR, SEPARATOR: Performed by: OBSTETRICS & GYNECOLOGY

## 2022-01-12 PROCEDURE — 700101 HCHG RX REV CODE 250: Performed by: ANESTHESIOLOGY

## 2022-01-12 PROCEDURE — 700111 HCHG RX REV CODE 636 W/ 250 OVERRIDE (IP): Performed by: ANESTHESIOLOGY

## 2022-01-12 PROCEDURE — 160046 HCHG PACU - 1ST 60 MINS PHASE II: Performed by: OBSTETRICS & GYNECOLOGY

## 2022-01-12 PROCEDURE — 160025 RECOVERY II MINUTES (STATS): Performed by: OBSTETRICS & GYNECOLOGY

## 2022-01-12 PROCEDURE — 88302 TISSUE EXAM BY PATHOLOGIST: CPT

## 2022-01-12 PROCEDURE — 85027 COMPLETE CBC AUTOMATED: CPT

## 2022-01-12 PROCEDURE — 160035 HCHG PACU - 1ST 60 MINS PHASE I: Performed by: OBSTETRICS & GYNECOLOGY

## 2022-01-12 PROCEDURE — 160048 HCHG OR STATISTICAL LEVEL 1-5: Performed by: OBSTETRICS & GYNECOLOGY

## 2022-01-12 PROCEDURE — 160009 HCHG ANES TIME/MIN: Performed by: OBSTETRICS & GYNECOLOGY

## 2022-01-12 PROCEDURE — 700102 HCHG RX REV CODE 250 W/ 637 OVERRIDE(OP): Performed by: ANESTHESIOLOGY

## 2022-01-12 PROCEDURE — 80048 BASIC METABOLIC PNL TOTAL CA: CPT

## 2022-01-12 PROCEDURE — 160039 HCHG SURGERY MINUTES - EA ADDL 1 MIN LEVEL 3: Performed by: OBSTETRICS & GYNECOLOGY

## 2022-01-12 PROCEDURE — 87426 SARSCOV CORONAVIRUS AG IA: CPT | Performed by: OBSTETRICS & GYNECOLOGY

## 2022-01-12 PROCEDURE — 58661 LAPAROSCOPY REMOVE ADNEXA: CPT | Performed by: OBSTETRICS & GYNECOLOGY

## 2022-01-12 PROCEDURE — 58661 LAPAROSCOPY REMOVE ADNEXA: CPT | Mod: 80 | Performed by: OBSTETRICS & GYNECOLOGY

## 2022-01-12 PROCEDURE — 84703 CHORIONIC GONADOTROPIN ASSAY: CPT

## 2022-01-12 PROCEDURE — 501838 HCHG SUTURE GENERAL: Performed by: OBSTETRICS & GYNECOLOGY

## 2022-01-12 RX ORDER — LIDOCAINE HYDROCHLORIDE 20 MG/ML
INJECTION, SOLUTION EPIDURAL; INFILTRATION; INTRACAUDAL; PERINEURAL PRN
Status: DISCONTINUED | OUTPATIENT
Start: 2022-01-12 | End: 2022-01-12 | Stop reason: SURG

## 2022-01-12 RX ORDER — HYDRALAZINE HYDROCHLORIDE 20 MG/ML
5 INJECTION INTRAMUSCULAR; INTRAVENOUS
Status: DISCONTINUED | OUTPATIENT
Start: 2022-01-12 | End: 2022-01-12 | Stop reason: HOSPADM

## 2022-01-12 RX ORDER — METOPROLOL TARTRATE 1 MG/ML
1 INJECTION, SOLUTION INTRAVENOUS
Status: DISCONTINUED | OUTPATIENT
Start: 2022-01-12 | End: 2022-01-12 | Stop reason: HOSPADM

## 2022-01-12 RX ORDER — HYDROMORPHONE HYDROCHLORIDE 1 MG/ML
0.2 INJECTION, SOLUTION INTRAMUSCULAR; INTRAVENOUS; SUBCUTANEOUS
Status: DISCONTINUED | OUTPATIENT
Start: 2022-01-12 | End: 2022-01-12 | Stop reason: HOSPADM

## 2022-01-12 RX ORDER — OXYCODONE HCL 5 MG/5 ML
5 SOLUTION, ORAL ORAL
Status: COMPLETED | OUTPATIENT
Start: 2022-01-12 | End: 2022-01-12

## 2022-01-12 RX ORDER — ACETAMINOPHEN 500 MG
1000 TABLET ORAL ONCE
Status: COMPLETED | OUTPATIENT
Start: 2022-01-12 | End: 2022-01-12

## 2022-01-12 RX ORDER — METOPROLOL TARTRATE 1 MG/ML
INJECTION, SOLUTION INTRAVENOUS PRN
Status: DISCONTINUED | OUTPATIENT
Start: 2022-01-12 | End: 2022-01-12 | Stop reason: SURG

## 2022-01-12 RX ORDER — ONDANSETRON 2 MG/ML
4 INJECTION INTRAMUSCULAR; INTRAVENOUS
Status: COMPLETED | OUTPATIENT
Start: 2022-01-12 | End: 2022-01-12

## 2022-01-12 RX ORDER — HYDROMORPHONE HYDROCHLORIDE 1 MG/ML
0.1 INJECTION, SOLUTION INTRAMUSCULAR; INTRAVENOUS; SUBCUTANEOUS
Status: DISCONTINUED | OUTPATIENT
Start: 2022-01-12 | End: 2022-01-12 | Stop reason: HOSPADM

## 2022-01-12 RX ORDER — KETOROLAC TROMETHAMINE 30 MG/ML
30 INJECTION, SOLUTION INTRAMUSCULAR; INTRAVENOUS ONCE
Status: COMPLETED | OUTPATIENT
Start: 2022-01-12 | End: 2022-01-12

## 2022-01-12 RX ORDER — SODIUM CHLORIDE, SODIUM LACTATE, POTASSIUM CHLORIDE, CALCIUM CHLORIDE 600; 310; 30; 20 MG/100ML; MG/100ML; MG/100ML; MG/100ML
INJECTION, SOLUTION INTRAVENOUS CONTINUOUS
Status: ACTIVE | OUTPATIENT
Start: 2022-01-12 | End: 2022-01-12

## 2022-01-12 RX ORDER — HALOPERIDOL 5 MG/ML
1 INJECTION INTRAMUSCULAR
Status: DISCONTINUED | OUTPATIENT
Start: 2022-01-12 | End: 2022-01-12 | Stop reason: HOSPADM

## 2022-01-12 RX ORDER — MEPERIDINE HYDROCHLORIDE 25 MG/ML
6.25 INJECTION INTRAMUSCULAR; INTRAVENOUS; SUBCUTANEOUS
Status: DISCONTINUED | OUTPATIENT
Start: 2022-01-12 | End: 2022-01-12 | Stop reason: HOSPADM

## 2022-01-12 RX ORDER — OXYCODONE HCL 5 MG/5 ML
10 SOLUTION, ORAL ORAL
Status: COMPLETED | OUTPATIENT
Start: 2022-01-12 | End: 2022-01-12

## 2022-01-12 RX ORDER — SODIUM CHLORIDE, SODIUM LACTATE, POTASSIUM CHLORIDE, CALCIUM CHLORIDE 600; 310; 30; 20 MG/100ML; MG/100ML; MG/100ML; MG/100ML
INJECTION, SOLUTION INTRAVENOUS CONTINUOUS
Status: DISCONTINUED | OUTPATIENT
Start: 2022-01-12 | End: 2022-01-12 | Stop reason: HOSPADM

## 2022-01-12 RX ORDER — KETOROLAC TROMETHAMINE 30 MG/ML
INJECTION, SOLUTION INTRAMUSCULAR; INTRAVENOUS
Status: COMPLETED
Start: 2022-01-12 | End: 2022-01-12

## 2022-01-12 RX ORDER — ONDANSETRON 2 MG/ML
INJECTION INTRAMUSCULAR; INTRAVENOUS PRN
Status: DISCONTINUED | OUTPATIENT
Start: 2022-01-12 | End: 2022-01-12 | Stop reason: SURG

## 2022-01-12 RX ORDER — DEXAMETHASONE SODIUM PHOSPHATE 4 MG/ML
INJECTION, SOLUTION INTRA-ARTICULAR; INTRALESIONAL; INTRAMUSCULAR; INTRAVENOUS; SOFT TISSUE PRN
Status: DISCONTINUED | OUTPATIENT
Start: 2022-01-12 | End: 2022-01-12 | Stop reason: SURG

## 2022-01-12 RX ORDER — OXYCODONE HYDROCHLORIDE 5 MG/1
5 TABLET ORAL EVERY 4 HOURS PRN
Qty: 10 TABLET | Refills: 0 | Status: SHIPPED | OUTPATIENT
Start: 2022-01-12 | End: 2022-01-15

## 2022-01-12 RX ORDER — DIPHENHYDRAMINE HYDROCHLORIDE 50 MG/ML
12.5 INJECTION INTRAMUSCULAR; INTRAVENOUS
Status: DISCONTINUED | OUTPATIENT
Start: 2022-01-12 | End: 2022-01-12 | Stop reason: HOSPADM

## 2022-01-12 RX ORDER — GABAPENTIN 300 MG/1
300 CAPSULE ORAL ONCE
Status: COMPLETED | OUTPATIENT
Start: 2022-01-12 | End: 2022-01-12

## 2022-01-12 RX ORDER — BUPIVACAINE HYDROCHLORIDE AND EPINEPHRINE 2.5; 5 MG/ML; UG/ML
INJECTION, SOLUTION EPIDURAL; INFILTRATION; INTRACAUDAL; PERINEURAL
Status: DISCONTINUED | OUTPATIENT
Start: 2022-01-12 | End: 2022-01-12 | Stop reason: HOSPADM

## 2022-01-12 RX ORDER — HYDROMORPHONE HYDROCHLORIDE 1 MG/ML
0.4 INJECTION, SOLUTION INTRAMUSCULAR; INTRAVENOUS; SUBCUTANEOUS
Status: DISCONTINUED | OUTPATIENT
Start: 2022-01-12 | End: 2022-01-12 | Stop reason: HOSPADM

## 2022-01-12 RX ADMIN — PROPOFOL 180 MG: 10 INJECTION, EMULSION INTRAVENOUS at 10:43

## 2022-01-12 RX ADMIN — ACETAMINOPHEN 1000 MG: 500 TABLET ORAL at 09:49

## 2022-01-12 RX ADMIN — FENTANYL CITRATE 25 MCG: 50 INJECTION, SOLUTION INTRAMUSCULAR; INTRAVENOUS at 10:41

## 2022-01-12 RX ADMIN — METOPROLOL TARTRATE 2 MG: 5 INJECTION, SOLUTION INTRAVENOUS at 11:34

## 2022-01-12 RX ADMIN — FENTANYL CITRATE 50 MCG: 50 INJECTION INTRAMUSCULAR; INTRAVENOUS at 12:06

## 2022-01-12 RX ADMIN — LIDOCAINE HYDROCHLORIDE 50 MG: 20 INJECTION, SOLUTION EPIDURAL; INFILTRATION; INTRACAUDAL at 10:43

## 2022-01-12 RX ADMIN — ONDANSETRON 4 MG: 2 INJECTION INTRAMUSCULAR; INTRAVENOUS at 12:01

## 2022-01-12 RX ADMIN — DEXAMETHASONE SODIUM PHOSPHATE 8 MG: 4 INJECTION, SOLUTION INTRA-ARTICULAR; INTRALESIONAL; INTRAMUSCULAR; INTRAVENOUS; SOFT TISSUE at 11:00

## 2022-01-12 RX ADMIN — OXYCODONE HYDROCHLORIDE 10 MG: 5 SOLUTION ORAL at 12:01

## 2022-01-12 RX ADMIN — FENTANYL CITRATE 50 MCG: 50 INJECTION INTRAMUSCULAR; INTRAVENOUS at 12:01

## 2022-01-12 RX ADMIN — MIDAZOLAM 2 MG: 1 INJECTION INTRAMUSCULAR; INTRAVENOUS at 10:41

## 2022-01-12 RX ADMIN — ROCURONIUM BROMIDE 20 MG: 10 INJECTION, SOLUTION INTRAVENOUS at 11:19

## 2022-01-12 RX ADMIN — ROCURONIUM BROMIDE 27 MG: 10 INJECTION, SOLUTION INTRAVENOUS at 10:50

## 2022-01-12 RX ADMIN — KETOROLAC TROMETHAMINE 30 MG: 30 INJECTION, SOLUTION INTRAMUSCULAR at 12:07

## 2022-01-12 RX ADMIN — FENTANYL CITRATE 50 MCG: 50 INJECTION, SOLUTION INTRAMUSCULAR; INTRAVENOUS at 11:17

## 2022-01-12 RX ADMIN — ROCURONIUM BROMIDE 3 MG: 10 INJECTION, SOLUTION INTRAVENOUS at 10:43

## 2022-01-12 RX ADMIN — SUGAMMADEX 200 MG: 100 INJECTION, SOLUTION INTRAVENOUS at 11:45

## 2022-01-12 RX ADMIN — HYDROMORPHONE HYDROCHLORIDE 0.4 MG: 1 INJECTION, SOLUTION INTRAMUSCULAR; INTRAVENOUS; SUBCUTANEOUS at 12:27

## 2022-01-12 RX ADMIN — Medication 120 MG: at 10:43

## 2022-01-12 RX ADMIN — PROPOFOL 20 MG: 10 INJECTION, EMULSION INTRAVENOUS at 11:17

## 2022-01-12 RX ADMIN — GABAPENTIN 300 MG: 300 CAPSULE ORAL at 09:49

## 2022-01-12 RX ADMIN — FENTANYL CITRATE 25 MCG: 50 INJECTION, SOLUTION INTRAMUSCULAR; INTRAVENOUS at 11:14

## 2022-01-12 RX ADMIN — KETOROLAC TROMETHAMINE 30 MG: 30 INJECTION, SOLUTION INTRAMUSCULAR at 12:05

## 2022-01-12 RX ADMIN — FENTANYL CITRATE 50 MCG: 50 INJECTION, SOLUTION INTRAMUSCULAR; INTRAVENOUS at 11:28

## 2022-01-12 RX ADMIN — HYDROMORPHONE HYDROCHLORIDE 0.2 MG: 1 INJECTION, SOLUTION INTRAMUSCULAR; INTRAVENOUS; SUBCUTANEOUS at 12:52

## 2022-01-12 RX ADMIN — HYDROMORPHONE HYDROCHLORIDE 0.4 MG: 1 INJECTION, SOLUTION INTRAMUSCULAR; INTRAVENOUS; SUBCUTANEOUS at 12:32

## 2022-01-12 RX ADMIN — FENTANYL CITRATE 50 MCG: 50 INJECTION, SOLUTION INTRAMUSCULAR; INTRAVENOUS at 11:06

## 2022-01-12 RX ADMIN — ONDANSETRON 4 MG: 2 INJECTION INTRAMUSCULAR; INTRAVENOUS at 11:06

## 2022-01-12 RX ADMIN — SODIUM CHLORIDE, POTASSIUM CHLORIDE, SODIUM LACTATE AND CALCIUM CHLORIDE: 600; 310; 30; 20 INJECTION, SOLUTION INTRAVENOUS at 09:49

## 2022-01-12 ASSESSMENT — PAIN DESCRIPTION - PAIN TYPE
TYPE: SURGICAL PAIN
TYPE: ACUTE PAIN;SURGICAL PAIN

## 2022-01-12 ASSESSMENT — FIBROSIS 4 INDEX: FIB4 SCORE: 0.88

## 2022-01-12 NOTE — OR NURSING
1300 - Report received from TEE Lara. Patient caregiver Brendaly updated via telephone     1330 - Patient dressed at bedside and up to recliner, steady gait   Report given back to TEE Lara

## 2022-01-12 NOTE — ANESTHESIA TIME REPORT
Anesthesia Start and Stop Event Times     Date Time Event    1/12/2022 1026 Ready for Procedure     1038 Anesthesia Start     1156 Anesthesia Stop        Responsible Staff  01/12/22    Name Role Begin End    Jarvis Urbina M.D. Anesth 1038 1156        Preop Diagnosis (Free Text):  Pre-op Diagnosis     PERMANENT STERILIZATION        Preop Diagnosis (Codes):    Premium Reason  Non-Premium    Comments:

## 2022-01-12 NOTE — ANESTHESIA PREPROCEDURE EVALUATION
Case: [id number] Date/Time: 01/12/22 0945    Procedures:       LAPAROSCOPY      SALPINGECTOMY (Bilateral )    Pre-op diagnosis: PERMANENT STERILIZATION    Location: [facility] ROOM 25 / SURGERY SAME DAY [facility]    Surgeons: Kimmy Dolan D.O.      bmi 41  DM  smoker  Relevant Problems   NEURO   (positive) History of seizures      GI   (positive) GERD (gastroesophageal reflux disease)      ENDO   (positive) Hypothyroidism       Physical Exam    Airway   Mallampati: II  TM distance: >3 FB  Neck ROM: limited       Cardiovascular - normal exam  Rhythm: regular  Rate: normal  (-) murmur     Dental - normal exam           Pulmonary - normal exam  Breath sounds clear to auscultation     Abdominal    Neurological - normal exam         Other findings: Upper teeth with caries          Anesthesia Plan    ASA 3   ASA physical status 3 criteria: morbid obesity - BMI greater than or equal to 40    Plan - general       Airway plan will be ETT          Induction: intravenous    Postoperative Plan: Postoperative administration of opioids is intended.    Pertinent diagnostic labs and testing reviewed    Informed Consent:    Anesthetic plan and risks discussed with patient.    Use of blood products discussed with: patient whom consented to blood products.

## 2022-01-12 NOTE — OP REPORT
Operative Note - Laparoscopic Bilateral Salpingectomy    Patient: Frances Ardon  MRN: [id number]   YOB: 1975   Age: 46 y.o.   Sex:female  Unit: [facility] PACU [facility]  [facility]  Location: [facility]      Date of Procedure: 1/12/2022     Preoperative Diagnosis: PERMANENT STERILIZATION  Postoperative Diagnosis: PERMANENT STERILIZATION   Procedure: Procedure(s):  LAPAROSCOPY  SALPINGECTOMY  Surgeon: Surgeon(s) and Role:     * Kimmy Dolan D.O. - Primary     * Sylvia Collado D.O.  Anesthesia: General   Antibiotics: none  Estimated Blood Loss: Minimal    Specimens: bilateral fallopian tubes    Findings: normal anatomy     Complications: none      INDICATIONS:   46 y.o. presents for operative laparoscopy for permanent sterilization.  The risks, benefits and alternatives of laparoscopy were discussed with the patient, including but not limited to infection, disfiguring scar, severe loss of blood, venous thrombosis, injury to bowel, bladder or ureter, injury to blood vessels, and rare chance of needing to convert to laparotomy to complete the surgery or any repair.  She was counseled on the permanent/irreversible nature of removal of fallopian tubes for sterilization.       PROCEDURE:   The patient was taken to the operating room where general anesthesia was undertaken and found to be adequate. She was then prepped and draped in the dorsal lithotomy position.  A time out was taken.  Eddington speculum was placed in the vagina and the cervix was identified and grasped with an Allis clamp clamp.  A Zumi uterine manipulator was placed.     Gloves were changed then attention was then turned to the abdomen.  The supraumbilical region was anesthetized with 0.25% marcaine plain.  A 5-mm skin incision was then made in the umbilicus. A Veress needle was then placed into the abdomen but could not verify intraabdominal placement after several attempts due to high pressures.   Instead, the optiview port was used to enter the abdomen under direct visualization and the abdomen was insufflated without difficulty.  Two lateral 5mm ports were then placed on left and right in the same manner under direct visualization.  At this time a thorough investigation of the pelvis was performed. The investigation revealed the findings listed above.    The first tube was grasped with a blunt grasper and elevated and then the Harmonic scalpel was used to ligate the tubo-ovarian ligament.  Dissection was carried along the meosalpinx to the uterine cornua.  The fallopian tube was then transected and the specimen detached. Procedure was repeated on the contralateral side.     Instruments were removed from the abdomen and the abdomen was relieved of all CO2 gas. The trocars were removed from the abdomen. The skin incisions were then closed with a 4-0 Vicryl stitch. The skin was then dressed with benzoine and Steri-strips. The uterine manipulator was removed from the uterus without difficulty. Hemostasis was noted.  The patient was taken to the PACU in stable condition.    Sponge, lap and needle counts were correct.    Kimmy Dolan D.O.

## 2022-01-12 NOTE — OR NURSING
1153: Patient from OR awake via gurney to PACU s/p laparoscopic bilateral salpingectomy. 4 L via mask. Abdominal lap sites (x3) soft with steri strips dressing clean,dry and intact. Report from the anesthesiologist and RN received.     1200: Patient reporting 10/10 pain and will medicate. SEE MAR.     1255: Mom updated. Will call Brendaly caregiver for .

## 2022-01-12 NOTE — DISCHARGE INSTRUCTIONS
ACTIVITY: Rest and take it easy for the first 24 hours.  A responsible adult is recommended to remain with you during that time.  It is normal to feel sleepy.  We encourage you to not do anything that requires balance, judgment or coordination.    MILD FLU-LIKE SYMPTOMS ARE NORMAL. YOU MAY EXPERIENCE GENERALIZED MUSCLE ACHES, THROAT IRRITATION, HEADACHE AND/OR SOME NAUSEA.    FOR 24 HOURS DO NOT:  Drive, operate machinery or run household appliances.  Drink beer or alcoholic beverages.   Make important decisions or sign legal documents.    SPECIAL INSTRUCTIONS:     SEE ATTACHED SHEET    DIET: To avoid nausea, slowly advance diet as tolerated, avoiding spicy or greasy foods for the first day.  Add more substantial food to your diet according to your physician's instructions.  Babies can be fed formula or breast milk as soon as they are hungry.  INCREASE FLUIDS AND FIBER TO AVOID CONSTIPATION.      FOLLOW-UP APPOINTMENT:  A follow-up appointment should be arranged with your doctor ; call to schedule.    You should CALL YOUR PHYSICIAN if you develop:  Fever greater than 101 degrees F.  Pain not relieved by medication, or persistent nausea or vomiting.  Excessive bleeding (blood soaking through dressing) or unexpected drainage from the wound.  Extreme redness or swelling around the incision site, drainage of pus or foul smelling drainage.  Inability to urinate or empty your bladder within 8 hours.  Problems with breathing or chest pain.    You should call 911 if you develop problems with breathing or chest pain.  If you are unable to contact your doctor or surgical center, you should go to the nearest emergency room or urgent care center.      Physician's telephone #: MACHADO [phone]    If any questions arise, call your doctor.  If your doctor is not available, please feel free to call the Surgical Center at [phone].     A registered nurse may call you a few days after your surgery to see how you are doing  after your procedure.    MEDICATIONS: Resume taking daily medication.  Take prescribed pain medication with food.  If no medication is prescribed, you may take non-aspirin pain medication if needed.  PAIN MEDICATION CAN BE VERY CONSTIPATING.  Take a stool softener or laxative such as senokot, pericolace, or milk of magnesia if needed.    Prescription given for ***.  Last pain medication given at 12 noon.    If your physician has prescribed pain medication that includes Acetaminophen (Tylenol), do not take additional Acetaminophen (Tylenol) while taking the prescribed medication.    Depression / Suicide Risk    As you are discharged from this [facility], it is important to learn how to keep safe from harming yourself.    Recognize the warning signs:  · Abrupt changes in personality, positive or negative- including increase in energy   · Giving away possessions  · Change in eating patterns- significant weight changes-  positive or negative  · Change in sleeping patterns- unable to sleep or sleeping all the time   · Unwillingness or inability to communicate  · Depression  · Unusual sadness, discouragement and loneliness  · Talk of wanting to die  · Neglect of personal appearance   · Rebelliousness- reckless behavior  · Withdrawal from people/activities they love  · Confusion- inability to concentrate     If you or a loved one observes any of these behaviors or has concerns about self-harm, here's what you can do:  · Talk about it- your feelings and reasons for harming yourself  · Remove any means that you might use to hurt yourself (examples: pills, rope, extension cords, firearm)  · Get professional help from the community (Mental Health, Substance Abuse, psychological counseling)  · Do not be alone:Call your Safe Contact- someone whom you trust who will be there for you.  · Call your local CRISIS HOTLINE 642-6741 or 748-275-1001  · Call your local Children's Mobile Crisis Response Team [facility]  (776) 610-4727 or www.Daily[employer]  · Call the toll free National Suicide Prevention Hotlines   · National Suicide Prevention Lifeline 400-540-LGAD (8979)  · National Beep Line Network 800-SUICIDE (042-7390)

## 2022-01-12 NOTE — ANESTHESIA PROCEDURE NOTES
Airway    Date/Time: 1/12/2022 10:44 AM  Performed by: Jarvis Urbina M.D.  Authorized by: Jarvis Urbina M.D.     Location:  OR  Urgency:  Elective  Indications for Airway Management:  Anesthesia      Spontaneous Ventilation: absent    Sedation Level:  Deep  Preoxygenated: Yes    Patient Position:  Sniffing  Final Airway Type:  Endotracheal airway  Final Endotracheal Airway:  ETT  Cuffed: Yes    Technique Used for Successful ETT Placement:  Direct laryngoscopy    Insertion Site:  Oral  Blade Type:  Glide  Laryngoscope Blade/Videolaryngoscope Blade Size:  3  ETT Size (mm):  7.0  Measured from:  Teeth  ETT to Teeth (cm):  21  Placement Verified by: auscultation and capnometry    Cormack-Lehane Classification:  Grade I - full view of glottis  Number of Attempts at Approach:  1   AT teeth as they were preop

## 2022-01-12 NOTE — OR NURSING
1340: Patient in a recliner. VSS. Encouraged deep breaths and cough.     1436: Mom at bedside.     1500: Caregiver Vanna updated. Discharge instructions discussed. All questions answered and verbalizes understanding.     1525: Charge RN talked to caregiver Vanna. Vanna states patient will need prescription for pain and will wait before taking patient home. Surgeon and office called and messages left.     1533: Surgeon will send prescription to [facility].     1535: No c/o pain or nausea. Patient wide awake. VSS. Discussed diet, activity, medications, follow up care and worsening symptoms. Patient met criteria for discharge.

## 2022-01-13 NOTE — ANESTHESIA POSTPROCEDURE EVALUATION
Patient: Frances Ardon    Procedure Summary     Date: 01/12/22 Room / Location: [facility] ROOM 25 / SURGERY SAME DAY [facility]    Anesthesia Start: 1038 Anesthesia Stop: 1156    Procedures:       LAPAROSCOPY (N/A Abdomen)      SALPINGECTOMY (Bilateral Abdomen) Diagnosis: (PERMANENT STERILIZATION)    Surgeons: Kimmy Dolan D.O. Responsible Provider: Jarvis Urbina M.D.    Anesthesia Type: general ASA Status: 3          Final Anesthesia Type: general  Last vitals  BP   Blood Pressure: 123/79    Temp   36.4 °C (97.5 °F)    Pulse   90   Resp   20    SpO2   93 %      Anesthesia Post Evaluation    Patient location during evaluation: PACU  Patient participation: complete - patient participated  Level of consciousness: awake and alert    Airway patency: patent  Anesthetic complications: no  Cardiovascular status: hemodynamically stable  Respiratory status: acceptable  Hydration status: euvolemic  Comments: Patient appears comfortable but O2 sat lowish after dilaudid and oxycontin.  She doesn't seem to understand pain scale, and gives her pain a 10 though she appears comfortable.    PONV: none          No complications documented.     Nurse Pain Score: 10 (NPRS)

## 2022-01-14 PROBLEM — Z12.4 SCREENING FOR MALIGNANT NEOPLASM OF CERVIX: Status: ACTIVE | Noted: 2022-01-14

## 2022-01-17 ENCOUNTER — TELEPHONE (OUTPATIENT)
Dept: OBGYN | Facility: CLINIC | Age: 47
End: 2022-01-17

## 2022-01-17 NOTE — TELEPHONE ENCOUNTER
----- Message from Kimmy Dolan D.O. sent at 2022  3:29 PM PST -----  Please inform patient that pap was normal but she was + for HPV.  Given normal pap, no further workup is needed at this time but we do recommend a repeat pap smear in 1 year (2023)    Called pt and spoke to Casey Weaver pt's caregiver, who is listed as approve to discuss treatment on pt's chart. Pt's  and last name confirmed. Casey notified as above. Casey verbalized understanding.

## 2022-01-26 ENCOUNTER — GYNECOLOGY VISIT (OUTPATIENT)
Dept: OBGYN | Facility: CLINIC | Age: 47
End: 2022-01-26
Payer: MEDICARE

## 2022-01-26 VITALS
WEIGHT: 285 LBS | DIASTOLIC BLOOD PRESSURE: 77 MMHG | HEIGHT: 69 IN | BODY MASS INDEX: 42.21 KG/M2 | SYSTOLIC BLOOD PRESSURE: 123 MMHG

## 2022-01-26 DIAGNOSIS — Z09 POSTOP CHECK: ICD-10-CM

## 2022-01-26 PROCEDURE — 99024 POSTOP FOLLOW-UP VISIT: CPT | Performed by: OBSTETRICS & GYNECOLOGY

## 2022-01-26 ASSESSMENT — FIBROSIS 4 INDEX: FIB4 SCORE: 0.91

## 2022-01-26 NOTE — PROGRESS NOTES
"GYN FOLLOW UP VISIT    CC:  Gynecologic Exam (Post-Op visit )       HPI: Patient is a 46 y.o.  who presents for follow up for postop visit after bilateral salpingectomy for permanent sterilization on 22.  She has no complaints.  Reports pain controlled. Plans to return to work  and needs release note       ROS:   General: denies fever / chills  HEENT: denies sore throat:  CV: denies chest pain:  Repiratory: denies shortness of breath  GI: denies abdominal pain  : denies dysuria:    PFSH:  I personally reviewed the past medical and surgical histories.       PHYSICAL EXAMINATION:  Vital Signs:   Vitals:    22 1433   BP: 123/77   BP Location: Right arm   Patient Position: Sitting   BP Cuff Size: Adult long   Weight: (!) 129 kg (285 lb)   Height: 1.753 m (5' 9\")     Body mass index is 42.09 kg/m².    Gen: appears well, NAD  Respiratory: normal effort  Abdomen: Soft, non-tender.  Incisions: well healed    ASSESSMENT AND PLAN:  Frances was seen today for gynecologic exam.    Diagnoses and all orders for this visit:    Postop check     - normal healing   - sterilization completed   - return to work note given   - return when due again for annual exam (~December)    Kimmy Dolan D.O.    "

## 2022-01-26 NOTE — LETTER
January 26, 2022       Patient: Frances Ardon   YOB: 1975   Date of Visit: 1/26/2022         To Whom It May Concern:    In my medical opinion, I recommend that Frances Ardon return to full duty, no restrictions on 1/31/2022    If you have any questions or concerns, please don't hesitate to call [phone]          Sincerely,          Kimmy Dolan D.O.

## 2022-01-26 NOTE — LETTER
January 26, 2022       Patient: Frances Ardon   YOB: 1975   Date of Visit: 1/26/2022         To Whom It May Concern:    Please excuse Frances Ardon from work beginning 1/12/2022 until 1/31/2022.  She may return to full duty, no restrictions on 1/31/2022    If you have any questions or concerns, please don't hesitate to call [phone]          Sincerely,          Kimmy Dolan D.O.

## 2022-02-04 ENCOUNTER — APPOINTMENT (OUTPATIENT)
Dept: PHYSICAL THERAPY | Facility: MEDICAL CENTER | Age: 47
End: 2022-02-04
Attending: PODIATRIST
Payer: MEDICARE

## 2022-02-08 ENCOUNTER — APPOINTMENT (OUTPATIENT)
Dept: PHYSICAL THERAPY | Facility: MEDICAL CENTER | Age: 47
End: 2022-02-08
Attending: PODIATRIST
Payer: MEDICARE

## 2022-02-10 ENCOUNTER — APPOINTMENT (OUTPATIENT)
Dept: PHYSICAL THERAPY | Facility: MEDICAL CENTER | Age: 47
End: 2022-02-10
Attending: PODIATRIST
Payer: MEDICARE

## 2022-02-15 ENCOUNTER — APPOINTMENT (OUTPATIENT)
Dept: PHYSICAL THERAPY | Facility: MEDICAL CENTER | Age: 47
End: 2022-02-15
Attending: PODIATRIST
Payer: MEDICARE

## 2022-02-16 ENCOUNTER — GYNECOLOGY VISIT (OUTPATIENT)
Dept: OBGYN | Facility: CLINIC | Age: 47
End: 2022-02-16
Payer: MEDICARE

## 2022-02-16 VITALS — BODY MASS INDEX: 42.23 KG/M2 | WEIGHT: 286 LBS | DIASTOLIC BLOOD PRESSURE: 76 MMHG | SYSTOLIC BLOOD PRESSURE: 107 MMHG

## 2022-02-16 DIAGNOSIS — N64.4 BREAST TENDERNESS: ICD-10-CM

## 2022-02-16 PROCEDURE — 99212 OFFICE O/P EST SF 10 MIN: CPT | Performed by: NURSE PRACTITIONER

## 2022-02-16 ASSESSMENT — FIBROSIS 4 INDEX: FIB4 SCORE: 0.91

## 2022-02-16 NOTE — PROGRESS NOTES
Pt here for follow up with her . She is having bilateral lower breast pain but has not noted any masses. Reports she is not sure when she started noticing this pain. She does wear an under wire bra that could be hitting right in that spot bilaterally. Her previous provider has ordered a routine mammogram for her but she was not sure how to schedule this.     Breast exam performed and no obvious masses, no skin color changes noted, no changes in shape or fixed tissue, tenderness with palpation bilaterally at the 7-8 o'clock area     Pt to call to schedule mammogram as this was previously ordered preventatively   Possibly changing bra type to be without a wire would alleviate this bilateral breast tenderness  Pt to continue to monitor for any other breast changes   Pt and her  are in agreement with plan and to follow up PRN or in a year as instructed by Magdaleno last month

## 2022-02-17 ENCOUNTER — APPOINTMENT (OUTPATIENT)
Dept: PHYSICAL THERAPY | Facility: MEDICAL CENTER | Age: 47
End: 2022-02-17
Attending: PODIATRIST
Payer: MEDICARE

## 2022-02-17 ENCOUNTER — HOSPITAL ENCOUNTER (OUTPATIENT)
Dept: RADIOLOGY | Facility: MEDICAL CENTER | Age: 47
End: 2022-02-17
Attending: OBSTETRICS & GYNECOLOGY
Payer: MEDICARE

## 2022-02-17 DIAGNOSIS — Z12.31 SCREENING MAMMOGRAM FOR BREAST CANCER: ICD-10-CM

## 2022-02-17 PROCEDURE — 77063 BREAST TOMOSYNTHESIS BI: CPT

## 2022-02-22 ENCOUNTER — APPOINTMENT (OUTPATIENT)
Dept: PHYSICAL THERAPY | Facility: MEDICAL CENTER | Age: 47
End: 2022-02-22
Attending: PODIATRIST
Payer: MEDICARE

## 2022-02-23 ENCOUNTER — PHYSICAL THERAPY (OUTPATIENT)
Dept: PHYSICAL THERAPY | Facility: MEDICAL CENTER | Age: 47
End: 2022-02-23
Attending: NURSE PRACTITIONER
Payer: MEDICARE

## 2022-02-23 DIAGNOSIS — M76.62 ACHILLES TENDINITIS, LEFT LEG: ICD-10-CM

## 2022-02-23 PROCEDURE — 97110 THERAPEUTIC EXERCISES: CPT

## 2022-02-23 PROCEDURE — 97161 PT EVAL LOW COMPLEX 20 MIN: CPT

## 2022-02-23 SDOH — ECONOMIC STABILITY: GENERAL: QUALITY OF LIFE: GOOD

## 2022-02-23 ASSESSMENT — ENCOUNTER SYMPTOMS
PAIN SCALE AT LOWEST: 0
PAIN SCALE: 0
PAIN SCALE AT HIGHEST: 3
PAIN LOCATION: L FOOT
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
## 2022-02-24 ENCOUNTER — APPOINTMENT (OUTPATIENT)
Dept: PHYSICAL THERAPY | Facility: MEDICAL CENTER | Age: 47
End: 2022-02-24
Attending: PODIATRIST
Payer: MEDICARE

## 2022-02-28 ENCOUNTER — APPOINTMENT (OUTPATIENT)
Dept: PHYSICAL THERAPY | Facility: MEDICAL CENTER | Age: 47
End: 2022-02-28
Attending: PHYSICAL MEDICINE & REHABILITATION
Payer: MEDICARE

## 2022-03-01 ENCOUNTER — APPOINTMENT (OUTPATIENT)
Dept: PHYSICAL THERAPY | Facility: MEDICAL CENTER | Age: 47
End: 2022-03-01
Payer: MEDICARE

## 2022-03-01 ENCOUNTER — APPOINTMENT (OUTPATIENT)
Dept: PHYSICAL THERAPY | Facility: MEDICAL CENTER | Age: 47
End: 2022-03-01
Attending: PODIATRIST
Payer: MEDICARE

## 2022-03-03 ENCOUNTER — APPOINTMENT (OUTPATIENT)
Dept: PHYSICAL THERAPY | Facility: MEDICAL CENTER | Age: 47
End: 2022-03-03
Attending: PODIATRIST
Payer: MEDICARE

## 2022-03-07 ENCOUNTER — APPOINTMENT (OUTPATIENT)
Dept: PHYSICAL THERAPY | Facility: MEDICAL CENTER | Age: 47
End: 2022-03-07
Attending: NURSE PRACTITIONER
Payer: MEDICARE

## 2022-03-21 ENCOUNTER — APPOINTMENT (OUTPATIENT)
Dept: PHYSICAL THERAPY | Facility: MEDICAL CENTER | Age: 47
End: 2022-03-21
Attending: PHYSICAL MEDICINE & REHABILITATION
Payer: MEDICARE

## 2022-03-22 ENCOUNTER — APPOINTMENT (OUTPATIENT)
Dept: RADIOLOGY | Facility: MEDICAL CENTER | Age: 47
End: 2022-03-22
Attending: EMERGENCY MEDICINE
Payer: MEDICARE

## 2022-03-22 ENCOUNTER — HOSPITAL ENCOUNTER (EMERGENCY)
Facility: MEDICAL CENTER | Age: 47
End: 2022-03-22
Attending: EMERGENCY MEDICINE
Payer: MEDICARE

## 2022-03-22 VITALS
BODY MASS INDEX: 42.12 KG/M2 | SYSTOLIC BLOOD PRESSURE: 132 MMHG | TEMPERATURE: 97.3 F | HEIGHT: 69 IN | RESPIRATION RATE: 16 BRPM | DIASTOLIC BLOOD PRESSURE: 83 MMHG | HEART RATE: 80 BPM | OXYGEN SATURATION: 98 % | WEIGHT: 284.39 LBS

## 2022-03-22 DIAGNOSIS — S20.219A CONTUSION OF CHEST WALL, UNSPECIFIED LATERALITY, INITIAL ENCOUNTER: ICD-10-CM

## 2022-03-22 DIAGNOSIS — S80.00XA CONTUSION OF KNEE, UNSPECIFIED LATERALITY, INITIAL ENCOUNTER: ICD-10-CM
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
## 2022-03-22 PROCEDURE — 71045 X-RAY EXAM CHEST 1 VIEW: CPT

## 2022-03-22 PROCEDURE — 73562 X-RAY EXAM OF KNEE 3: CPT | Mod: LT

## 2022-03-22 PROCEDURE — 99283 EMERGENCY DEPT VISIT LOW MDM: CPT | Mod: 25

## 2022-03-22 PROCEDURE — 73560 X-RAY EXAM OF KNEE 1 OR 2: CPT | Mod: RT

## 2022-03-22 ASSESSMENT — FIBROSIS 4 INDEX: FIB4 SCORE: 0.91
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
## 2022-03-23 NOTE — DISCHARGE INSTRUCTIONS
You appear to have contusions or bruises of both knees in the chest.  Take ibuprofen 800 mg and Tylenol 650 mg as needed for pain.  See your doctor if not better in a week.  Return for shortness of breath or dizziness.  This is not expected.  Use crutches a couple days if helpful.

## 2022-03-25 ENCOUNTER — OFFICE VISIT (OUTPATIENT)
Dept: URGENT CARE | Facility: CLINIC | Age: 47
End: 2022-03-25
Payer: MEDICARE

## 2022-03-25 VITALS
HEART RATE: 86 BPM | DIASTOLIC BLOOD PRESSURE: 76 MMHG | HEIGHT: 69 IN | OXYGEN SATURATION: 96 % | RESPIRATION RATE: 16 BRPM | BODY MASS INDEX: 41.62 KG/M2 | SYSTOLIC BLOOD PRESSURE: 132 MMHG | WEIGHT: 281 LBS | TEMPERATURE: 98 F

## 2022-03-25 DIAGNOSIS — S80.01XA CONTUSION OF RIGHT KNEE, INITIAL ENCOUNTER: ICD-10-CM

## 2022-03-25 PROCEDURE — 99213 OFFICE O/P EST LOW 20 MIN: CPT | Performed by: PHYSICIAN ASSISTANT

## 2022-03-25 ASSESSMENT — ENCOUNTER SYMPTOMS
MYALGIAS: 0
CONSTIPATION: 0
VOMITING: 0
SHORTNESS OF BREATH: 0
EYE PAIN: 0
HEADACHES: 0
CHILLS: 0
SORE THROAT: 0
NAUSEA: 0
DIARRHEA: 0
COUGH: 0
ABDOMINAL PAIN: 0
FEVER: 0

## 2022-03-25 ASSESSMENT — FIBROSIS 4 INDEX: FIB4 SCORE: 0.91
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
## 2022-03-30 ENCOUNTER — APPOINTMENT (OUTPATIENT)
Dept: PHYSICAL THERAPY | Facility: MEDICAL CENTER | Age: 47
End: 2022-03-30
Attending: PHYSICAL MEDICINE & REHABILITATION
Payer: MEDICARE

## 2022-04-01 ENCOUNTER — APPOINTMENT (OUTPATIENT)
Dept: PHYSICAL THERAPY | Facility: MEDICAL CENTER | Age: 47
End: 2022-04-01
Attending: PHYSICAL MEDICINE & REHABILITATION
Payer: MEDICARE

## 2022-04-06 ENCOUNTER — APPOINTMENT (OUTPATIENT)
Dept: PHYSICAL THERAPY | Facility: MEDICAL CENTER | Age: 47
End: 2022-04-06
Attending: PHYSICAL MEDICINE & REHABILITATION
Payer: MEDICARE

## 2022-04-08 ENCOUNTER — APPOINTMENT (OUTPATIENT)
Dept: PHYSICAL THERAPY | Facility: MEDICAL CENTER | Age: 47
End: 2022-04-08
Attending: PHYSICAL MEDICINE & REHABILITATION
Payer: MEDICARE

## 2022-04-08 ENCOUNTER — HOSPITAL ENCOUNTER (EMERGENCY)
Facility: MEDICAL CENTER | Age: 47
End: 2022-04-08
Attending: EMERGENCY MEDICINE
Payer: MEDICARE

## 2022-04-08 VITALS
DIASTOLIC BLOOD PRESSURE: 77 MMHG | SYSTOLIC BLOOD PRESSURE: 124 MMHG | HEART RATE: 81 BPM | WEIGHT: 279.54 LBS | HEIGHT: 69 IN | BODY MASS INDEX: 41.4 KG/M2 | TEMPERATURE: 98.5 F | RESPIRATION RATE: 16 BRPM | OXYGEN SATURATION: 94 %

## 2022-04-08 DIAGNOSIS — R07.89 CHEST WALL PAIN: ICD-10-CM

## 2022-04-08 DIAGNOSIS — M79.604 MUSCULOSKELETAL PAIN OF RIGHT LOWER EXTREMITY: ICD-10-CM

## 2022-04-08 PROCEDURE — 700102 HCHG RX REV CODE 250 W/ 637 OVERRIDE(OP): Performed by: EMERGENCY MEDICINE

## 2022-04-08 PROCEDURE — A9270 NON-COVERED ITEM OR SERVICE: HCPCS | Performed by: EMERGENCY MEDICINE

## 2022-04-08 PROCEDURE — 99282 EMERGENCY DEPT VISIT SF MDM: CPT

## 2022-04-08 RX ORDER — ACETAMINOPHEN 325 MG/1
650 TABLET ORAL ONCE
Status: COMPLETED | OUTPATIENT
Start: 2022-04-08 | End: 2022-04-08

## 2022-04-08 RX ADMIN — ACETAMINOPHEN 650 MG: 325 TABLET, FILM COATED ORAL at 13:27

## 2022-04-08 ASSESSMENT — FIBROSIS 4 INDEX: FIB4 SCORE: 0.91

## 2022-04-08 ASSESSMENT — PAIN DESCRIPTION - PAIN TYPE: TYPE: ACUTE PAIN

## 2022-04-08 NOTE — ED TRIAGE NOTES
"Chief Complaint   Patient presents with   • Leg Pain     Pt has RLE pain in her shin region. Pt denies any falls or trauma, but states she \"woke up with the pain\".    • Rib Pain     Pt has R sided rib pain as well. Pt denies any falls or trauma     Pt ambulatory into triage room for above. Educated pt on triage process and to notify if there is any change.    /86   Pulse 87   Temp 36.7 °C (98 °F) (Temporal)   Resp 18   Ht 1.753 m (5' 9\")   Wt (!) 127 kg (279 lb 8.7 oz)   LMP 04/08/2022   SpO2 97%   BMI 41.28 kg/m²     "

## 2022-04-08 NOTE — ED PROVIDER NOTES
"ED Provider Note    Scribed for Xavier Al M.D. by Amira Gregorio. 4/8/2022  12:57 PM    Primary care provider: TWILA Escoto  Means of arrival: Walk in  History obtained from: Patient  History limited by: None    CHIEF COMPLAINT  Chief Complaint   Patient presents with   • Leg Pain     Pt has RLE pain in her shin region. Pt denies any falls or trauma, but states she \"woke up with the pain\".    • Rib Pain     Pt has R sided rib pain as well. Pt denies any falls or trauma       HPI  Frances Ardon is a 46 y.o. female who presents to the Emergency Department for evaluation of right hip pain onset last night. She has associated right lower extremity pain. Patient denies any injury or fall. She admits to associated symptoms of rib pain, pain with coughing, intermittent cough, intermittent knee pain, pain with ambulating, and right upper quadrant pain but denies vomiting or fever. No alleviating factors were reported. Patient smokes a pack a day. Patient received two vaccinations for COVID-19. Patient was sent here by her .       REVIEW OF SYSTEMS  Pertinent negatives include no vomiting or fever.  See HPI for further details.     PAST MEDICAL HISTORY   has a past medical history of Arthritis, Asthma, Dyslipidemia (12/27/2016), GERD (gastroesophageal reflux disease) (12/27/2016), History of seizures (12/27/2016), Hypertension, Hypothyroidism (12/27/2016), Seizure (HCC), and Tobacco dependence (12/27/2016).    SURGICAL HISTORY   has a past surgical history that includes open reduction; lap,diagnostic abdomen (N/A, 1/12/2022); and salpingectomy (Bilateral, 1/12/2022).    SOCIAL HISTORY  Social History     Tobacco Use   • Smoking status: Current Every Day Smoker     Packs/day: 1.00     Years: 12.00     Pack years: 12.00     Types: Cigarettes   • Smokeless tobacco: Never Used   Vaping Use   • Vaping Use: Never used   Substance Use Topics   • Alcohol use: Yes     Alcohol/week: 0.0 oz     " "Comment: Occasionally   • Drug use: No      Social History     Substance and Sexual Activity   Drug Use No       FAMILY HISTORY  Family History   Problem Relation Age of Onset   • Cancer Neg Hx        CURRENT MEDICATIONS  Home Medications     Reviewed by Shaina Strong R.N. (Registered Nurse) on 04/08/22 at 1207  Med List Status: Partial   Medication Last Dose Status   acetaminophen (TYLENOL) 500 MG Tab  Active   busPIRone (BUSPAR) 10 MG Tab tablet  Active   cephALEXin (KEFLEX) 500 MG Cap  Active   Diclofenac Sodium 1 % Gel  Active   Disposable Gloves (LATEX GLOVES LARGE) Misc  Active   hydrOXYzine HCl (ATARAX) 50 MG Tab  Active   lamoTRIgine (LAMICTAL) 100 MG Tab  Active   levothyroxine (SYNTHROID) 50 MCG Tab  Active   levothyroxine (SYNTHROID) 88 MCG Tab  Active   lisinopril (PRINIVIL) 5 MG Tab  Active   loratadine (CLARITIN) 10 MG Tab  Active   meclizine (ANTIVERT) 25 MG Tab  Active   Melatonin 3 MG Cap  Active   naproxen (NAPROSYN) 500 MG Tab  Active   nicotine polacrilex (NICORETTE) 4 MG gum  Active   olopatadine (PATANOL) 0.1 % ophthalmic solution  Active   olopatadine (PATANOL) 0.1 % ophthalmic solution  Active   Omega-3 Fatty Acids (FISH OIL) 500 MG Cap  Active   omeprazole (PRILOSEC) 20 MG delayed-release capsule  Active   oxybutynin (DITROPAN) 5 MG Tab  Active   paliperidone (INVEGA) 3 MG ER tablet  Active   polyethylene glycol 3350 (MIRALAX) Powder  Active   propranolol (INDERAL) 10 MG Tab  Active   traZODone (DESYREL) 50 MG Tab  Active                ALLERGIES  No Known Allergies    PHYSICAL EXAM  VITAL SIGNS: /86   Pulse 87   Temp 36.7 °C (98 °F) (Temporal)   Resp 18   Ht 1.753 m (5' 9\")   Wt (!) 127 kg (279 lb 8.7 oz)   LMP 04/08/2022   SpO2 97%   BMI 41.28 kg/m²   Constitutional: Well developed, Well nourished, No acute distress, Non-toxic appearance.   HENT: Normal  Eyes: Normal  Neck: Normal  Lymphatic: No lymphadenopathy  Cardiovascular: Regular rate and rhythm  Thorax & Lungs: " Right chest wall tenderness that is reproducible and anterior, no wheezing  Abdomen: right upper quadrant tenderness  Skin: No cyanosis or diaphoresis  Back: No tenderness  Extremities: Tenderness on right leg  Musculoskeletal: Normal  Neurologic: Normal  Psychiatric: Anxious    COURSE & MEDICAL DECISION MAKING  Nursing notes, VS, PMSFHx reviewed in chart.    12:57 PM Patient seen and examined at bedside. Patient was treated with acetaminophen 650 mg PO for her symptoms. I discussed plan for discharge and follow up as outlined below. The patient is stable for discharge at this time and will return for any new or worsening symptoms. Patient verbalizes understanding and support with my plan for discharge.     Xavier Al M.D. spent approximately five minutes with the patient explaining the importance of smoking cessation.     Patient has had high blood pressure while in the emergency department, felt likely secondary to medical condition. Counseled patient to monitor blood pressure at home and follow up with primary care physician.       The patient will return for new or worsening symptoms and is stable at the time of discharge.    DISPOSITION:  Patient will be discharged home in stable condition.    FINAL IMPRESSION  1. Chest wall pain    2. Musculoskeletal pain of right lower extremity          Amira SOSA (Anuj), am scribing for, and in the presence of, Xavier Al M.D..    Electronically signed by: Amira Gregorio (Anuj), 4/8/2022    IXavier M.D. personally performed the services described in this documentation, as scribed by Amira Gregorio in my presence, and it is both accurate and complete. e    The note accurately reflects work and decisions made by me.  Xavier Al M.D.  4/8/2022  1:39 PM

## 2022-04-11 ENCOUNTER — APPOINTMENT (OUTPATIENT)
Dept: PHYSICAL THERAPY | Facility: MEDICAL CENTER | Age: 47
End: 2022-04-11
Attending: NURSE PRACTITIONER
Payer: MEDICARE

## 2022-04-18 PROBLEM — M76.62 ACHILLES TENDINITIS, LEFT LEG: Status: ACTIVE | Noted: 2022-04-18

## 2022-05-18 ENCOUNTER — APPOINTMENT (OUTPATIENT)
Dept: RADIOLOGY | Facility: MEDICAL CENTER | Age: 47
End: 2022-05-18
Attending: EMERGENCY MEDICINE
Payer: MEDICARE

## 2022-05-18 ENCOUNTER — HOSPITAL ENCOUNTER (EMERGENCY)
Facility: MEDICAL CENTER | Age: 47
End: 2022-05-18
Attending: EMERGENCY MEDICINE
Payer: MEDICARE

## 2022-05-18 VITALS
OXYGEN SATURATION: 97 % | SYSTOLIC BLOOD PRESSURE: 132 MMHG | RESPIRATION RATE: 18 BRPM | TEMPERATURE: 98.4 F | BODY MASS INDEX: 41.57 KG/M2 | HEIGHT: 69 IN | HEART RATE: 90 BPM | DIASTOLIC BLOOD PRESSURE: 88 MMHG | WEIGHT: 280.65 LBS

## 2022-05-18 VITALS
DIASTOLIC BLOOD PRESSURE: 76 MMHG | HEIGHT: 69 IN | WEIGHT: 282.41 LBS | SYSTOLIC BLOOD PRESSURE: 117 MMHG | BODY MASS INDEX: 41.83 KG/M2 | HEART RATE: 85 BPM | RESPIRATION RATE: 16 BRPM | OXYGEN SATURATION: 96 % | TEMPERATURE: 96.7 F

## 2022-05-18 DIAGNOSIS — M76.61 ACHILLES TENDINITIS OF RIGHT LOWER EXTREMITY: ICD-10-CM

## 2022-05-18 DIAGNOSIS — S60.051A CONTUSION OF RIGHT LITTLE FINGER WITHOUT DAMAGE TO NAIL, INITIAL ENCOUNTER: ICD-10-CM

## 2022-05-18 DIAGNOSIS — M76.62 ACHILLES TENDINITIS, LEFT LEG: Primary | ICD-10-CM

## 2022-05-18 PROCEDURE — 99284 EMERGENCY DEPT VISIT MOD MDM: CPT | Mod: 27

## 2022-05-18 PROCEDURE — 99283 EMERGENCY DEPT VISIT LOW MDM: CPT

## 2022-05-18 PROCEDURE — A9270 NON-COVERED ITEM OR SERVICE: HCPCS | Performed by: EMERGENCY MEDICINE

## 2022-05-18 PROCEDURE — 73140 X-RAY EXAM OF FINGER(S): CPT | Mod: RT

## 2022-05-18 PROCEDURE — 700102 HCHG RX REV CODE 250 W/ 637 OVERRIDE(OP): Performed by: EMERGENCY MEDICINE

## 2022-05-18 RX ORDER — IBUPROFEN 600 MG/1
600 TABLET ORAL ONCE
Status: COMPLETED | OUTPATIENT
Start: 2022-05-18 | End: 2022-05-18

## 2022-05-18 RX ORDER — IBUPROFEN 600 MG/1
600 TABLET ORAL EVERY 6 HOURS PRN
Qty: 30 TABLET | Refills: 0 | Status: SHIPPED | OUTPATIENT
Start: 2022-05-18 | End: 2022-07-26

## 2022-05-18 RX ADMIN — IBUPROFEN 600 MG: 600 TABLET, FILM COATED ORAL at 20:27

## 2022-05-18 ASSESSMENT — FIBROSIS 4 INDEX
FIB4 SCORE: 0.91
FIB4 SCORE: 0.91

## 2022-05-19 NOTE — DISCHARGE INSTRUCTIONS
Utilize the walking boot as discussed and follow-up with your primary care doctor or the [facility] if you are not better in 7 to 10 days.

## 2022-05-19 NOTE — ED TRIAGE NOTES
"Frances Ardon  46 y.o. female  Chief Complaint   Patient presents with   • Finger Pain     Patient reports bending back her right 5th digit and \"it hurts.\" No deformity present.      Patient seen by ERP in triage. Xray ordered.     Vitals:    05/18/22 2152   BP: 138/87   Pulse: 92   Resp: 16   Temp: 36.4 °C (97.5 °F)   SpO2: 94%       Triage process explained to patient, apologized for wait time, and returned to lobby.  Pt informed to notify staff of any change in condition.     "

## 2022-05-19 NOTE — ED PROVIDER NOTES
"ED Provider Note    CHIEF COMPLAINT  Chief Complaint   Patient presents with   • Foot Pain     Pt report right heel pain upon palpation.  Pt swelling or redness noted.         HPI  Frances Ardon is a 46 y.o. female who presents with pain on the right heel.  The patient states since about 6:00 this morning she has had pain in the right Achilles region.  She does not remember any direct injury.  The pain is worse with walking.  She does not have any other musculoskeletal complaints.  The pain is moderate in intensity.    REVIEW OF SYSTEMS  No recent fevers or chills, no other musculoskeletal complaints    PHYSICAL EXAM  VITAL SIGNS: /87   Pulse (!) 101   Temp 37.1 °C (98.7 °F) (Temporal)   Resp 18   Ht 1.753 m (5' 9\")   Wt (!) 127 kg (280 lb 10.3 oz)   LMP 05/05/2022 (Exact Date)   SpO2 96%   BMI 41.44 kg/m²   In general the patient is anxious but nontoxic    Cardiovascular S1-S2 with a slightly tachycardic rate    Extremities patient has pain around the Achilles tendon on the right with no surrounding erythema nor induration.  The Achilles tendon is intact.  She has a normal midfoot and right knee exam.    Skin no erythema nor induration    Neurovascular semination is grossly intact to the right lower extremity    COURSE & MEDICAL DECISION MAKING  Pertinent Labs & Imaging studies reviewed. (See chart for details)  This a 46-year-old female who presents with signs and symptoms consistent with right Achilles tendinitis.  The patiently placed in a short walking boot for comfort and she will be treated with anti-inflammatories.  She will be discharged with instructions to follow-up with the [facility] if she is not better in 7 to 10 days.  Clinically do not appreciate any evidence of an infection.  She has not had any traumatic injury therefore x-rays were not ordered.    FINAL IMPRESSION  1.  Right Achilles tendinitis         Disposition  The patient will be discharged in stable " condition      Electronically signed by: Amrit Delcid M.D., 5/18/2022 8:11 PM

## 2022-05-19 NOTE — ED PROVIDER NOTES
"ED Provider Note    CHIEF COMPLAINT  Chief Complaint   Patient presents with   • Finger Pain     Patient reports bending back her right 5th digit and \"it hurts.\" No deformity present.        HPI  Frances Ardon is a 46 y.o. female who presents with pain to the right fifth phalanx.  I evaluated the patient a couple hours earlier and she states she failed to mention that she also injured her right fifth phalanx a couple days ago.  She states she hit it on a door.  She states the pain is in the right proximal fifth phalanx.  The pain is worse with flexion extension at the IP and MCP joint.    REVIEW OF SYSTEMS  No fevers, she did have the right Achilles tendon pain please see my prior dictation, otherwise no other musculoskeletal complaints    PHYSICAL EXAM  VITAL SIGNS: /87   Pulse 92   Temp 36.4 °C (97.5 °F) (Temporal)   Resp 16   Ht 1.753 m (5' 9\")   Wt (!) 128 kg (282 lb 6.6 oz)   LMP 05/05/2022 (Exact Date)   SpO2 94%   BMI 41.70 kg/m²   In general the patient does not appear toxic    Extremities the patient does have pain surrounding the right fifth proximal phalanx but no obvious deformity.  She has full flexion extension of the right MCP and PIP joint.  Otherwise normal right hand and right wrist exam.    Skin no erythema nor induration    Neurovascular examination is grossly intact to the right hand    RADIOLOGY/PROCEDURES  DX-FINGER(S) 2+ RIGHT   Final Result         1.  No acute traumatic bony injury.            COURSE & MEDICAL DECISION MAKING  Pertinent Labs & Imaging studies reviewed. (See chart for details)  This a 46-year-old female who presents with pain to the right fifth phalanx.  I suspect this is more from a contusion.  X-ray does not show any evidence of an acute fracture.  The patient will take anti-inflammatories and follow-up with her doctor approximate 1 week.    FINAL IMPRESSION  1.  Right fifth proximal phalanx pain     Disposition  The patient will be discharged in " stable condition  Electronically signed by: Amrit Delcid M.D., 5/18/2022 10:53 PM

## 2022-05-19 NOTE — ED TRIAGE NOTES
"Chief Complaint   Patient presents with   • Foot Pain     Pt report right heel pain upon palpation.  Pt swelling or redness noted.       Pt to triage [facility] from group home with above complaint.    /87   Pulse (!) 101   Temp 37.1 °C (98.7 °F) (Temporal)   Resp 18   Ht 1.753 m (5' 9\")   Wt (!) 127 kg (280 lb 10.3 oz)   LMP 05/05/2022 (Exact Date)   SpO2 96%   BMI 41.44 kg/m²     "

## 2022-05-19 NOTE — ED NOTES
Discharge instructions given to pt. Prescriptions sent pt's pharmacy. Pt educated, verbalizes understanding. All belongings accounted for. Pt ambulated out of ED with steady gait and boot in place. Bus pass provided to get home per pt request.

## 2022-06-26 ENCOUNTER — APPOINTMENT (OUTPATIENT)
Dept: RADIOLOGY | Facility: MEDICAL CENTER | Age: 47
End: 2022-06-26
Attending: EMERGENCY MEDICINE
Payer: MEDICARE

## 2022-06-26 ENCOUNTER — HOSPITAL ENCOUNTER (EMERGENCY)
Facility: MEDICAL CENTER | Age: 47
End: 2022-06-27
Attending: EMERGENCY MEDICINE
Payer: MEDICARE

## 2022-06-26 DIAGNOSIS — N39.0 ACUTE UTI: ICD-10-CM

## 2022-06-26 DIAGNOSIS — R55 SYNCOPE, UNSPECIFIED SYNCOPE TYPE: ICD-10-CM

## 2022-06-26 LAB
ALBUMIN SERPL BCP-MCNC: 4.7 G/DL (ref 3.2–4.9)
ALBUMIN/GLOB SERPL: 1.6 G/DL
ALP SERPL-CCNC: 78 U/L (ref 30–99)
ALT SERPL-CCNC: 17 U/L (ref 2–50)
ANION GAP SERPL CALC-SCNC: 12 MMOL/L (ref 7–16)
APPEARANCE UR: ABNORMAL
AST SERPL-CCNC: 16 U/L (ref 12–45)
BACTERIA #/AREA URNS HPF: ABNORMAL /HPF
BILIRUB SERPL-MCNC: 0.2 MG/DL (ref 0.1–1.5)
BILIRUB UR QL STRIP.AUTO: NEGATIVE
BUN SERPL-MCNC: 13 MG/DL (ref 8–22)
CALCIUM SERPL-MCNC: 9.2 MG/DL (ref 8.5–10.5)
CHLORIDE SERPL-SCNC: 103 MMOL/L (ref 96–112)
CO2 SERPL-SCNC: 22 MMOL/L (ref 20–33)
COLOR UR: YELLOW
CREAT SERPL-MCNC: 0.82 MG/DL (ref 0.5–1.4)
EKG IMPRESSION: NORMAL
EPI CELLS #/AREA URNS HPF: ABNORMAL /HPF
GFR SERPLBLD CREATININE-BSD FMLA CKD-EPI: 88 ML/MIN/1.73 M 2
GLOBULIN SER CALC-MCNC: 3 G/DL (ref 1.9–3.5)
GLUCOSE SERPL-MCNC: 87 MG/DL (ref 65–99)
GLUCOSE UR STRIP.AUTO-MCNC: NEGATIVE MG/DL
HCG SERPL QL: NEGATIVE
KETONES UR STRIP.AUTO-MCNC: ABNORMAL MG/DL
LEUKOCYTE ESTERASE UR QL STRIP.AUTO: ABNORMAL
MICRO URNS: ABNORMAL
MUCOUS THREADS #/AREA URNS HPF: ABNORMAL /HPF
NITRITE UR QL STRIP.AUTO: NEGATIVE
PH UR STRIP.AUTO: 5 [PH] (ref 5–8)
POTASSIUM SERPL-SCNC: 3.7 MMOL/L (ref 3.6–5.5)
PROT SERPL-MCNC: 7.7 G/DL (ref 6–8.2)
PROT UR QL STRIP: NEGATIVE MG/DL
RBC UR QL AUTO: NEGATIVE
SODIUM SERPL-SCNC: 137 MMOL/L (ref 135–145)
SP GR UR STRIP.AUTO: 1.04
TROPONIN T SERPL-MCNC: 7 NG/L (ref 6–19)
UROBILINOGEN UR STRIP.AUTO-MCNC: 1 MG/DL
WBC #/AREA URNS HPF: ABNORMAL /HPF

## 2022-06-26 PROCEDURE — 73560 X-RAY EXAM OF KNEE 1 OR 2: CPT | Mod: LT

## 2022-06-26 PROCEDURE — 700105 HCHG RX REV CODE 258: Performed by: EMERGENCY MEDICINE

## 2022-06-26 PROCEDURE — 700102 HCHG RX REV CODE 250 W/ 637 OVERRIDE(OP): Performed by: EMERGENCY MEDICINE

## 2022-06-26 PROCEDURE — 84703 CHORIONIC GONADOTROPIN ASSAY: CPT

## 2022-06-26 PROCEDURE — 93005 ELECTROCARDIOGRAM TRACING: CPT | Performed by: EMERGENCY MEDICINE

## 2022-06-26 PROCEDURE — 80053 COMPREHEN METABOLIC PANEL: CPT

## 2022-06-26 PROCEDURE — 99284 EMERGENCY DEPT VISIT MOD MDM: CPT

## 2022-06-26 PROCEDURE — 36415 COLL VENOUS BLD VENIPUNCTURE: CPT

## 2022-06-26 PROCEDURE — 81001 URINALYSIS AUTO W/SCOPE: CPT

## 2022-06-26 PROCEDURE — A9270 NON-COVERED ITEM OR SERVICE: HCPCS | Performed by: EMERGENCY MEDICINE

## 2022-06-26 PROCEDURE — 84484 ASSAY OF TROPONIN QUANT: CPT

## 2022-06-26 PROCEDURE — 73030 X-RAY EXAM OF SHOULDER: CPT | Mod: LT

## 2022-06-26 RX ORDER — NITROFURANTOIN 25; 75 MG/1; MG/1
100 CAPSULE ORAL ONCE
Status: COMPLETED | OUTPATIENT
Start: 2022-06-26 | End: 2022-06-27

## 2022-06-26 RX ORDER — SODIUM CHLORIDE, SODIUM LACTATE, POTASSIUM CHLORIDE, CALCIUM CHLORIDE 600; 310; 30; 20 MG/100ML; MG/100ML; MG/100ML; MG/100ML
1000 INJECTION, SOLUTION INTRAVENOUS ONCE
Status: COMPLETED | OUTPATIENT
Start: 2022-06-26 | End: 2022-06-26

## 2022-06-26 RX ORDER — NITROFURANTOIN 25; 75 MG/1; MG/1
100 CAPSULE ORAL 2 TIMES DAILY
Qty: 20 CAPSULE | Refills: 0 | Status: SHIPPED | OUTPATIENT
Start: 2022-06-26 | End: 2022-07-26

## 2022-06-26 RX ORDER — MECLIZINE HYDROCHLORIDE 25 MG/1
25 TABLET ORAL ONCE
Status: COMPLETED | OUTPATIENT
Start: 2022-06-26 | End: 2022-06-26

## 2022-06-26 RX ORDER — IBUPROFEN 600 MG/1
600 TABLET ORAL ONCE
Status: COMPLETED | OUTPATIENT
Start: 2022-06-26 | End: 2022-06-26

## 2022-06-26 RX ADMIN — IBUPROFEN 600 MG: 600 TABLET, FILM COATED ORAL at 22:31

## 2022-06-26 RX ADMIN — SODIUM CHLORIDE, POTASSIUM CHLORIDE, SODIUM LACTATE AND CALCIUM CHLORIDE 1000 ML: 600; 310; 30; 20 INJECTION, SOLUTION INTRAVENOUS at 22:00

## 2022-06-26 RX ADMIN — MECLIZINE HYDROCHLORIDE 25 MG: 25 TABLET ORAL at 22:15

## 2022-06-26 ASSESSMENT — FIBROSIS 4 INDEX: FIB4 SCORE: 0.91

## 2022-06-26 NOTE — Clinical Note
Frances Ardon was seen and treated in our emergency department on 6/26/2022.  She may return to work on 06/29/2022.       If you have any questions or concerns, please don't hesitate to call.      Wyatt Dao D.O.

## 2022-06-26 NOTE — ED TRIAGE NOTES
Ambulates to triage  Chief Complaint   Patient presents with   • Syncope     Felt very dizzy before she passed out, hit her L shoulder, did not hit her head     Pt was at the bus stop, fell to the ground hitting her L shoulder. Also c/o of L shin pain. Has never passed our before.

## 2022-06-27 VITALS
RESPIRATION RATE: 16 BRPM | HEART RATE: 72 BPM | WEIGHT: 281.31 LBS | TEMPERATURE: 98 F | SYSTOLIC BLOOD PRESSURE: 139 MMHG | HEIGHT: 69 IN | DIASTOLIC BLOOD PRESSURE: 73 MMHG | OXYGEN SATURATION: 97 % | BODY MASS INDEX: 41.67 KG/M2

## 2022-06-27 PROCEDURE — A9270 NON-COVERED ITEM OR SERVICE: HCPCS | Performed by: EMERGENCY MEDICINE

## 2022-06-27 PROCEDURE — 700102 HCHG RX REV CODE 250 W/ 637 OVERRIDE(OP): Performed by: EMERGENCY MEDICINE

## 2022-06-27 RX ADMIN — NITROFURANTOIN MONOHYDRATE/MACROCRYSTALLINE 100 MG: 25; 75 CAPSULE ORAL at 00:01

## 2022-06-27 NOTE — ED PROVIDER NOTES
"ED Provider Note    CHIEF COMPLAINT  Chief Complaint   Patient presents with   • Syncope     Felt very dizzy before she passed out, hit her L shoulder, did not hit her head       HPI  Frances Ardon is a 46 y.o. female here for evaluation of syncope.  Patient states that she has been having intermittent dizzy episodes, when she went to stand up, she became lightheaded, and \"passed out for a minute.\"  She states that she struck her left shoulder and left knee, but did not strike her head.  She has no chest pain or shortness of breath, no vomiting, no abdominal pain, no fever chills.  Patient has no other medical concerns at this time.      ROS  See HPI for further details, o/w negative.     PAST MEDICAL HISTORY   has a past medical history of Arthritis, Asthma, Dyslipidemia (12/27/2016), GERD (gastroesophageal reflux disease) (12/27/2016), History of seizures (12/27/2016), Hypertension, Hypothyroidism (12/27/2016), Seizure (HCC), and Tobacco dependence (12/27/2016).    SOCIAL HISTORY  Social History     Tobacco Use   • Smoking status: Former Smoker     Packs/day: 1.00     Years: 12.00     Pack years: 12.00     Types: Cigarettes     Quit date: 6/23/2022   • Smokeless tobacco: Never Used   Vaping Use   • Vaping Use: Never used   Substance and Sexual Activity   • Alcohol use: Not Currently   • Drug use: No   • Sexual activity: Not Currently     Partners: Male       Family History  No bleeding disorders    SURGICAL HISTORY   has a past surgical history that includes open reduction; lap,diagnostic abdomen (N/A, 1/12/2022); and salpingectomy (Bilateral, 1/12/2022).    CURRENT MEDICATIONS  Home Medications     Reviewed by Laura Cuenca R.N. (Registered Nurse) on 06/26/22 at 1630  Med List Status: Partial   Medication Last Dose Status   acetaminophen (TYLENOL) 500 MG Tab  Active   busPIRone (BUSPAR) 10 MG Tab tablet  Active   cephALEXin (KEFLEX) 500 MG Cap  Active   Diclofenac Sodium 1 % Gel  Active "   Disposable Gloves (LATEX GLOVES LARGE) Misc  Active   hydrOXYzine HCl (ATARAX) 50 MG Tab  Active   ibuprofen (MOTRIN) 600 MG Tab  Active   lamoTRIgine (LAMICTAL) 100 MG Tab  Active   levothyroxine (SYNTHROID) 50 MCG Tab  Active   levothyroxine (SYNTHROID) 88 MCG Tab  Active   lisinopril (PRINIVIL) 5 MG Tab  Active   loratadine (CLARITIN) 10 MG Tab  Active   meclizine (ANTIVERT) 25 MG Tab  Active   Melatonin 3 MG Cap  Active   meloxicam (MOBIC) 15 MG tablet  Active   naproxen (NAPROSYN) 500 MG Tab  Active   Omega-3 Fatty Acids (FISH OIL) 500 MG Cap  Active   omeprazole (PRILOSEC) 20 MG delayed-release capsule  Active   oxybutynin (DITROPAN) 5 MG Tab  Active   paliperidone (INVEGA) 3 MG ER tablet  Active   polyethylene glycol 3350 (MIRALAX) Powder  Active   propranolol (INDERAL) 10 MG Tab  Active                ALLERGIES  No Known Allergies    REVIEW OF SYSTEMS  See HPI for further details. Review of systems as above, otherwise all other systems are negative.     PHYSICAL EXAM  Constitutional: Well developed, well nourished. No acute distress.  HEENT: Normocephalic, atraumatic. Posterior pharynx clear and moist.  Eyes:  EOMI. Normal sclera.  Neck: Supple, Full range of motion, nontender.  Chest/Pulmonary: clear to ausculation. Symmetrical expansion.   Cardio: Regular rate and rhythm with no murmur.   Abdomen: Soft, nontender. No peritoneal signs. No guarding. No palpable masses.  Back: No CVA tenderness, nontender midline, no step offs.  Musculoskeletal: No deformity, no edema, neurovascular intact.   Neuro: Clear speech, appropriate, cooperative, cranial nerves II-XII grossly intact.  Psych: Normal mood and affect    Results for orders placed or performed during the hospital encounter of 06/26/22   Comp Metabolic Panel   Result Value Ref Range    Sodium 137 135 - 145 mmol/L    Potassium 3.7 3.6 - 5.5 mmol/L    Chloride 103 96 - 112 mmol/L    Co2 22 20 - 33 mmol/L    Anion Gap 12.0 7.0 - 16.0    Glucose 87 65 - 99  mg/dL    Bun 13 8 - 22 mg/dL    Creatinine 0.82 0.50 - 1.40 mg/dL    Calcium 9.2 8.5 - 10.5 mg/dL    AST(SGOT) 16 12 - 45 U/L    ALT(SGPT) 17 2 - 50 U/L    Alkaline Phosphatase 78 30 - 99 U/L    Total Bilirubin 0.2 0.1 - 1.5 mg/dL    Albumin 4.7 3.2 - 4.9 g/dL    Total Protein 7.7 6.0 - 8.2 g/dL    Globulin 3.0 1.9 - 3.5 g/dL    A-G Ratio 1.6 g/dL   BETA-HCG QUALITATIVE SERUM   Result Value Ref Range    Beta-Hcg Qualitative Serum Negative Negative   URINALYSIS,CULTURE IF INDICATED    Specimen: Urine   Result Value Ref Range    Color Yellow     Character Turbid (A)     Specific Gravity 1.036 <1.035    Ph 5.0 5.0 - 8.0    Glucose Negative Negative mg/dL    Ketones Trace (A) Negative mg/dL    Protein Negative Negative mg/dL    Bilirubin Negative Negative    Urobilinogen, Urine 1.0 Negative    Nitrite Negative Negative    Leukocyte Esterase Moderate (A) Negative    Occult Blood Negative Negative    Micro Urine Req Microscopic    ESTIMATED GFR   Result Value Ref Range    GFR (CKD-EPI) 88 >60 mL/min/1.73 m 2   TROPONIN   Result Value Ref Range    Troponin T 7 6 - 19 ng/L   URINE MICROSCOPIC (W/UA)   Result Value Ref Range    WBC 5-10 (A) /hpf    Bacteria Moderate (A) None /hpf    Epithelial Cells Many (A) /hpf    Mucous Threads Few /hpf   EKG   Result Value Ref Range    Report       [facility] Emergency Dept.    Test Date:  2022  Pt Name:    MORALES GOODEN             Department: ER  MRN:        [id number]                      Room:       RD 03  Gender:     Female                       Technician: [id number]  :        [id number]                   Requested By:ER TRIAGE PROTOCOL  Order #:    [id number]                    Reading MD:    Measurements  Intervals                                Axis  Rate:       67                           P:          24  VA:         140                          QRS:        32  QRSD:       88                           T:          14  QT:         397  QTc:         419    Interpretive Statements  Sinus rhythm  Compared to ECG 12/07/2021 14:24:43  No significant changes       DX-KNEE 2- LEFT   Final Result         1.  No acute traumatic bony injury.      DX-SHOULDER 2+ LEFT   Final Result         1.  No acute traumatic bony injury.           Ekg; normal sinus rhythm rate of 67.  No ST elevation, no ST depression.  QTC is 419.  Compared to EKG from 12/7/2021.    PROCEDURES     MEDICAL RECORD  I have reviewed patient's medical record and pertinent results are listed.    COURSE & MEDICAL DECISION MAKING  I have reviewed any medical record information, laboratory studies and radiographic results as noted above.    11:23 PM  The pt has a uti, and will be treated as such. She has improvement after iv fluids, and feels better.   Her cardiac work up is negative, and she has no cp, or sob.   She did not strike her head, has no headache, and at this time, no need for ct.     HYDRATION: Based on the patient's presentation of Dehydration the patient was given IV fluids. IV Hydration was used because oral hydration was not adequate alone. Upon recheck following hydration, the patient was improved.      If you have had any blood pressure issues while here in the emergency department, please see your doctor for a further evaluation or work up.    Differential diagnoses include but not limited to:     This patient presents with uti and syncope  .  At this time, I have counseled the patient/family regarding their medications, pain control, and follow up.  They will continue their medications, if any, as prescribed.  They will return immediately for any worsening symptoms and/or any other medical concerns.  They will see their doctor, or contact the doctor provided, in 1-2 days for follow up.       FINAL IMPRESSION  UTI  Syncope  Dizziness         Electronically signed by: Wyatt Dao D.O., 6/26/2022 9:22 PM

## 2022-06-27 NOTE — ED NOTES
AVS discussed with patient, pt ambulatory with steady gait. Given taxi voucher, called taxi for patient. Pt to go home to caregiver

## 2022-06-27 NOTE — ED NOTES
Spoke with patients gil-Marisol, with permission of patient. Pt has full time live-in caregiver at home

## 2022-06-27 NOTE — ED NOTES
Pt up to restroom with steady gait. Still unable to give additional urine sample for culture. Given water. MD notified

## 2022-06-27 NOTE — DISCHARGE PLANNING
Medical Social Work    MSW spoke with bedside RN regarding pt's need for a safe ride home.  Pt has developmental delays and has a full time caregiver who cannot pick the pt up as they do not drive at night.  Pt's mom also cannot pick the pt up as she's in [place].  Pt's mom informed bedside RN that pt is capable of taking a cab alone to return to her home.  Pt verified her address: [address] [place] [address].  Cab voucher (# [id number]) given to bedside RN for pt to return home safely.

## 2022-07-22 ENCOUNTER — APPOINTMENT (OUTPATIENT)
Dept: RADIOLOGY | Facility: MEDICAL CENTER | Age: 47
End: 2022-07-22
Attending: EMERGENCY MEDICINE
Payer: MEDICARE

## 2022-07-22 ENCOUNTER — HOSPITAL ENCOUNTER (EMERGENCY)
Facility: MEDICAL CENTER | Age: 47
End: 2022-07-22
Attending: EMERGENCY MEDICINE
Payer: MEDICARE

## 2022-07-22 VITALS
WEIGHT: 220 LBS | HEART RATE: 91 BPM | BODY MASS INDEX: 32.58 KG/M2 | RESPIRATION RATE: 18 BRPM | TEMPERATURE: 98.1 F | SYSTOLIC BLOOD PRESSURE: 132 MMHG | DIASTOLIC BLOOD PRESSURE: 88 MMHG | OXYGEN SATURATION: 95 % | HEIGHT: 69 IN

## 2022-07-22 DIAGNOSIS — S20.212A CONTUSION OF LEFT CHEST WALL, INITIAL ENCOUNTER: ICD-10-CM

## 2022-07-22 PROCEDURE — 71101 X-RAY EXAM UNILAT RIBS/CHEST: CPT | Mod: LT

## 2022-07-22 PROCEDURE — A9270 NON-COVERED ITEM OR SERVICE: HCPCS | Performed by: EMERGENCY MEDICINE

## 2022-07-22 PROCEDURE — 99284 EMERGENCY DEPT VISIT MOD MDM: CPT

## 2022-07-22 PROCEDURE — 700102 HCHG RX REV CODE 250 W/ 637 OVERRIDE(OP): Performed by: EMERGENCY MEDICINE

## 2022-07-22 RX ORDER — IBUPROFEN 600 MG/1
600 TABLET ORAL ONCE
Status: COMPLETED | OUTPATIENT
Start: 2022-07-22 | End: 2022-07-22

## 2022-07-22 RX ORDER — IBUPROFEN 800 MG/1
800 TABLET ORAL EVERY 8 HOURS PRN
Qty: 30 TABLET | Refills: 0 | Status: SHIPPED | OUTPATIENT
Start: 2022-07-22 | End: 2022-07-26

## 2022-07-22 RX ORDER — CYCLOBENZAPRINE HCL 10 MG
10 TABLET ORAL 3 TIMES DAILY PRN
Qty: 30 TABLET | Refills: 0 | Status: SHIPPED | OUTPATIENT
Start: 2022-07-22 | End: 2022-07-26

## 2022-07-22 RX ADMIN — IBUPROFEN 600 MG: 600 TABLET, FILM COATED ORAL at 18:21

## 2022-07-22 ASSESSMENT — FIBROSIS 4 INDEX: FIB4 SCORE: 0.69

## 2022-07-22 NOTE — ED TRIAGE NOTES
"Chief Complaint   Patient presents with   • Rib Pain     That started this afternoon on her L side. Pt states worsening pain with inhalation       Pt BIB EMS for above. Pt denies any traumas or falls. Pt given 400mg motrin and 1G tylenol. Pt aox4, GCS 15.      BP (!) 141/99   Pulse 92   Temp 36.4 °C (97.5 °F) (Temporal)   Resp 18   Ht 1.753 m (5' 9\")   Wt 99.8 kg (220 lb)   LMP 06/22/2022 (Approximate)   SpO2 95%   BMI 32.49 kg/m²     "

## 2022-07-23 NOTE — ED NOTES
Pt provided with discharge instructions. Pt had no further questions. Pt ambulated to [facility]

## 2022-07-23 NOTE — ED PROVIDER NOTES
"ED Provider Note    CHIEF COMPLAINT  Chief Complaint   Patient presents with   • Rib Pain     That started this afternoon on her L side. Pt states worsening pain with inhalation       HPI  Frances Ardon is a 46 y.o. female here for evaluation of a left rib contusion.  Patient states that she was getting up from a seated position, when she tripped and fell over to the side, landing on the left side of the floor.  States that she had pain since that time, but did not strike her head, has no neck or back pain, and no headache.  She has no chest pain or shortness of breath.  Patient has no other medical concerns at this time.  She did not take anything prior to or for the same.  Patient states that when she presses on the left side of her ribs, she can \"feel the pain.\"    ROS;  Please see HPI  O/W negative     PAST MEDICAL HISTORY   has a past medical history of Arthritis, Asthma, Dyslipidemia (2016), GERD (gastroesophageal reflux disease) (2016), History of seizures (2016), Hypertension, Hypothyroidism (2016), Seizure (HCC), and Tobacco dependence (2016).    SOCIAL HISTORY  Social History     Tobacco Use   • Smoking status: Former Smoker     Packs/day: 1.00     Years: 12.00     Pack years: 12.00     Types: Cigarettes     Quit date: 2022     Years since quittin.0   • Smokeless tobacco: Never Used   Vaping Use   • Vaping Use: Never used   Substance and Sexual Activity   • Alcohol use: Not Currently   • Drug use: No   • Sexual activity: Not Currently     Partners: Male       SURGICAL HISTORY   has a past surgical history that includes open reduction; lap,diagnostic abdomen (N/A, 2022); salpingectomy (Bilateral, 2022); removal of heel bone (2022); exc tumor soft tissue leg/ankle subfascia* (2022); achilles tendon repair (Left, 2022); tendon lenghtening (2022); and bursa excision (2022).    CURRENT MEDICATIONS  Home Medications     Reviewed by " "Shaina Diamond R.N. (Registered Nurse) on 07/22/22 at 1548  Med List Status: Partial   Medication Last Dose Status   acetaminophen (TYLENOL) 500 MG Tab  Active   busPIRone (BUSPAR) 10 MG Tab tablet  Active   cephALEXin (KEFLEX) 500 MG Cap  Active   Diclofenac Sodium 1 % Gel  Active   Disposable Gloves (LATEX GLOVES LARGE) Misc  Active   gabapentin (NEURONTIN) 300 MG Cap  Active   hydrOXYzine HCl (ATARAX) 50 MG Tab  Active   ibuprofen (MOTRIN) 600 MG Tab  Active   lamoTRIgine (LAMICTAL) 100 MG Tab  Active   levothyroxine (SYNTHROID) 50 MCG Tab  Active   levothyroxine (SYNTHROID) 88 MCG Tab  Active   lisinopril (PRINIVIL) 5 MG Tab  Active   loratadine (CLARITIN) 10 MG Tab  Active   meclizine (ANTIVERT) 25 MG Tab  Active   Melatonin 3 MG Cap  Active   meloxicam (MOBIC) 15 MG tablet  Active   naproxen (NAPROSYN) 500 MG Tab  Active   nitrofurantoin (MACROBID) 100 MG Cap  Active   Omega-3 Fatty Acids (FISH OIL) 500 MG Cap  Active   omeprazole (PRILOSEC) 20 MG delayed-release capsule  Active   oxybutynin (DITROPAN) 5 MG Tab  Active   paliperidone (INVEGA) 3 MG ER tablet  Active   polyethylene glycol 3350 (MIRALAX) Powder  Active   propranolol (INDERAL) 10 MG Tab  Active                ALLERGIES  No Known Allergies    REVIEW OF SYSTEMS  See HPI for further details. Review of systems as above, otherwise all other systems are negative.     PHYSICAL EXAM  VITAL SIGNS: BP (!) 141/99   Pulse 92   Temp 36.4 °C (97.5 °F) (Temporal)   Resp 18   Ht 1.753 m (5' 9\")   Wt 99.8 kg (220 lb)   LMP 06/22/2022 (Approximate)   SpO2 95%   BMI 32.49 kg/m²     Constitutional: Well developed, well nourished. No acute distress.  HEENT: Normocephalic, atraumatic. MMM  Neck: Supple, Full range of motion   Chest/Pulmonary:  No respiratory distress.  Equal expansion , left lower ribs with tenderness to palpation, no crepitus.  Musculoskeletal: No deformity, no edema, neurovascular intact.   Neuro: Clear speech, appropriate, cooperative, " cranial nerves II-XII grossly intact.  Psych: Normal mood and affect      PROCEDURES     MEDICAL RECORD  I have reviewed patient's medical record and pertinent results are listed.    COURSE & MEDICAL DECISION MAKING  I have reviewed any medical record information, laboratory studies and radiographic results as noted above.    [id number] (WITH 1-VIEW CXR) LEFT   Final Result      No obviously angulated or displaced rib fracture.        6:02 PM  The patient is nontoxic, afebrile comfortable.  She has a negative rib series, her oxygen saturation is 95% on room air, and she is comfortable.  She has reproducible tenderness to the left lateral rib margin.  No ptx on  X ray.  She will return here for any other issues or concerns.     I you have had any blood pressure issues while here in the emergency department, please see your doctor for a further evaluation or work up.    Differential diagnoses include but not limited to: fracture, strain ptx    This patient presents with left rib contusion .  At this time, I have counseled the patient/family regarding their medications, pain control, and follow up.  They will continue their medications, if any, as prescribed.  They will return immediately for any worsening symptoms and/or any other medical concerns.  They will see their doctor, or contact the doctor provided, in 1-2 days for follow up.       FINAL IMPRESSION  Left rib contusion       Electronically signed by: Wyatt Dao D.O., 7/22/2022 6:01 PM

## 2022-07-25 ENCOUNTER — HOSPITAL ENCOUNTER (EMERGENCY)
Facility: MEDICAL CENTER | Age: 47
End: 2022-07-26
Attending: EMERGENCY MEDICINE
Payer: MEDICARE

## 2022-07-25 DIAGNOSIS — R44.3 HALLUCINATIONS: Primary | ICD-10-CM

## 2022-07-25 DIAGNOSIS — R46.89 THREATENING BEHAVIOR: ICD-10-CM

## 2022-07-25 LAB — ETHANOL BLD-MCNC: <10.1 MG/DL

## 2022-07-25 PROCEDURE — 99284 EMERGENCY DEPT VISIT MOD MDM: CPT

## 2022-07-25 PROCEDURE — 80307 DRUG TEST PRSMV CHEM ANLYZR: CPT

## 2022-07-25 PROCEDURE — 82077 ASSAY SPEC XCP UR&BREATH IA: CPT

## 2022-07-25 PROCEDURE — 36415 COLL VENOUS BLD VENIPUNCTURE: CPT

## 2022-07-25 RX ORDER — OLANZAPINE 5 MG/1
5 TABLET, ORALLY DISINTEGRATING ORAL ONCE
Status: COMPLETED | OUTPATIENT
Start: 2022-07-25 | End: 2022-07-26

## 2022-07-25 ASSESSMENT — FIBROSIS 4 INDEX: FIB4 SCORE: 0.69

## 2022-07-26 VITALS
HEIGHT: 69 IN | DIASTOLIC BLOOD PRESSURE: 101 MMHG | HEART RATE: 90 BPM | SYSTOLIC BLOOD PRESSURE: 146 MMHG | OXYGEN SATURATION: 97 % | RESPIRATION RATE: 18 BRPM | BODY MASS INDEX: 34.07 KG/M2 | WEIGHT: 230 LBS | TEMPERATURE: 97.7 F

## 2022-07-26 LAB
AMPHET UR QL SCN: NEGATIVE
BARBITURATES UR QL SCN: NEGATIVE
BENZODIAZ UR QL SCN: NEGATIVE
BZE UR QL SCN: NEGATIVE
CANNABINOIDS UR QL SCN: NEGATIVE
METHADONE UR QL SCN: NEGATIVE
OPIATES UR QL SCN: NEGATIVE
OXYCODONE UR QL SCN: NEGATIVE
PCP UR QL SCN: NEGATIVE
PROPOXYPH UR QL SCN: NEGATIVE

## 2022-07-26 PROCEDURE — 700102 HCHG RX REV CODE 250 W/ 637 OVERRIDE(OP): Performed by: EMERGENCY MEDICINE

## 2022-07-26 PROCEDURE — 90791 PSYCH DIAGNOSTIC EVALUATION: CPT

## 2022-07-26 PROCEDURE — A9270 NON-COVERED ITEM OR SERVICE: HCPCS | Performed by: EMERGENCY MEDICINE

## 2022-07-26 PROCEDURE — 99283 EMERGENCY DEPT VISIT LOW MDM: CPT | Performed by: PSYCHIATRY & NEUROLOGY

## 2022-07-26 RX ORDER — PRAZOSIN HYDROCHLORIDE 1 MG/1
2 CAPSULE ORAL NIGHTLY
Status: DISCONTINUED | OUTPATIENT
Start: 2022-07-26 | End: 2022-07-26 | Stop reason: HOSPADM

## 2022-07-26 RX ORDER — ARIPIPRAZOLE 20 MG/1
20 TABLET ORAL DAILY
COMMUNITY

## 2022-07-26 RX ORDER — LEVOTHYROXINE SODIUM 0.07 MG/1
75 TABLET ORAL
COMMUNITY

## 2022-07-26 RX ORDER — DOCUSATE SODIUM 100 MG/1
200 CAPSULE, LIQUID FILLED ORAL 2 TIMES DAILY
COMMUNITY

## 2022-07-26 RX ORDER — SENNOSIDES 8.6 MG
650 CAPSULE ORAL 3 TIMES DAILY
COMMUNITY

## 2022-07-26 RX ORDER — GABAPENTIN 300 MG/1
300 CAPSULE ORAL 3 TIMES DAILY
Status: DISCONTINUED | OUTPATIENT
Start: 2022-07-26 | End: 2022-07-26 | Stop reason: HOSPADM

## 2022-07-26 RX ORDER — CYCLOBENZAPRINE HCL 5 MG
5 TABLET ORAL 3 TIMES DAILY
COMMUNITY

## 2022-07-26 RX ORDER — LEVOTHYROXINE SODIUM 0.07 MG/1
75 TABLET ORAL
Status: DISCONTINUED | OUTPATIENT
Start: 2022-07-27 | End: 2022-07-26 | Stop reason: HOSPADM

## 2022-07-26 RX ORDER — GABAPENTIN 300 MG/1
300 CAPSULE ORAL 3 TIMES DAILY
COMMUNITY

## 2022-07-26 RX ORDER — PRAZOSIN HYDROCHLORIDE 2 MG/1
2 CAPSULE ORAL NIGHTLY
COMMUNITY

## 2022-07-26 RX ORDER — HYDROXYZINE HYDROCHLORIDE 25 MG/1
50 TABLET, FILM COATED ORAL
Status: DISCONTINUED | OUTPATIENT
Start: 2022-07-26 | End: 2022-07-26 | Stop reason: HOSPADM

## 2022-07-26 RX ORDER — SUCRALFATE 1 G/1
1 TABLET ORAL
COMMUNITY

## 2022-07-26 RX ORDER — TRIAMCINOLONE ACETONIDE 1 MG/ML
1 LOTION TOPICAL 2 TIMES DAILY
COMMUNITY

## 2022-07-26 RX ORDER — SUCRALFATE 1 G/1
1 TABLET ORAL
Status: DISCONTINUED | OUTPATIENT
Start: 2022-07-26 | End: 2022-07-26 | Stop reason: HOSPADM

## 2022-07-26 RX ORDER — HYDROXYZINE HYDROCHLORIDE 10 MG/1
10 TABLET, FILM COATED ORAL 3 TIMES DAILY PRN
COMMUNITY

## 2022-07-26 RX ORDER — LISINOPRIL 10 MG/1
5 TABLET ORAL DAILY
Status: DISCONTINUED | OUTPATIENT
Start: 2022-07-26 | End: 2022-07-26 | Stop reason: HOSPADM

## 2022-07-26 RX ORDER — OXYBUTYNIN CHLORIDE 15 MG/1
15 TABLET, EXTENDED RELEASE ORAL DAILY
COMMUNITY

## 2022-07-26 RX ORDER — CHOLECALCIFEROL (VITAMIN D3) 125 MCG
5 CAPSULE ORAL
Status: DISCONTINUED | OUTPATIENT
Start: 2022-07-26 | End: 2022-07-26 | Stop reason: HOSPADM

## 2022-07-26 RX ORDER — CYCLOBENZAPRINE HCL 10 MG
5 TABLET ORAL 3 TIMES DAILY
Status: DISCONTINUED | OUTPATIENT
Start: 2022-07-26 | End: 2022-07-26 | Stop reason: HOSPADM

## 2022-07-26 RX ORDER — OXYBUTYNIN CHLORIDE 10 MG/1
10 TABLET, EXTENDED RELEASE ORAL DAILY
Status: DISCONTINUED | OUTPATIENT
Start: 2022-07-26 | End: 2022-07-26 | Stop reason: HOSPADM

## 2022-07-26 RX ORDER — DOCUSATE SODIUM 100 MG/1
200 CAPSULE, LIQUID FILLED ORAL 2 TIMES DAILY
Status: DISCONTINUED | OUTPATIENT
Start: 2022-07-26 | End: 2022-07-26 | Stop reason: HOSPADM

## 2022-07-26 RX ORDER — CHOLECALCIFEROL (VITAMIN D3) 125 MCG
5 CAPSULE ORAL
COMMUNITY

## 2022-07-26 RX ORDER — LAMOTRIGINE 100 MG/1
200 TABLET ORAL DAILY
Status: DISCONTINUED | OUTPATIENT
Start: 2022-07-26 | End: 2022-07-26 | Stop reason: HOSPADM

## 2022-07-26 RX ORDER — OMEPRAZOLE 20 MG/1
20 CAPSULE, DELAYED RELEASE ORAL DAILY
Status: DISCONTINUED | OUTPATIENT
Start: 2022-07-27 | End: 2022-07-26 | Stop reason: HOSPADM

## 2022-07-26 RX ADMIN — LISINOPRIL 5 MG: 10 TABLET ORAL at 13:20

## 2022-07-26 RX ADMIN — CYCLOBENZAPRINE 5 MG: 10 TABLET, FILM COATED ORAL at 13:19

## 2022-07-26 RX ADMIN — LAMOTRIGINE 200 MG: 100 TABLET ORAL at 13:20

## 2022-07-26 RX ADMIN — OXYBUTYNIN CHLORIDE 10 MG: 10 TABLET, EXTENDED RELEASE ORAL at 14:18

## 2022-07-26 RX ADMIN — GABAPENTIN 300 MG: 300 CAPSULE ORAL at 13:20

## 2022-07-26 RX ADMIN — OLANZAPINE 5 MG: 5 TABLET, ORALLY DISINTEGRATING ORAL at 00:27

## 2022-07-26 ASSESSMENT — ENCOUNTER SYMPTOMS
VOMITING: 0
PALPITATIONS: 0
BLOOD IN STOOL: 0
TREMORS: 0
HEMOPTYSIS: 0
FEVER: 0
BLURRED VISION: 0
CHILLS: 0
CONSTIPATION: 0
DIARRHEA: 0
SHORTNESS OF BREATH: 0
NAUSEA: 0
HEADACHES: 0

## 2022-07-26 NOTE — ED NOTES
This RN spoke with pt's mother, Marisol.  Per Marisol, she has attempted to call the pt's group home several times without success.  Per Marisol, pt could be discharged to her sister's house  Jeet - [phone] @ [address]

## 2022-07-26 NOTE — ED NOTES
Med rec completed per pt's home pharmacy ([facility])   Unknown last doses of medications taken   No MAR was sent with pt from her group home
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
## 2022-07-26 NOTE — ED NOTES
Report received from Jeet OLIVEIRA. Per report, pt's group home will not accept pt back to their facility.  SW aware.

## 2022-07-26 NOTE — ED NOTES
Pt resting in gurney, respirations even and unlabored. NAD noted. Telesitter in direct view of pt.

## 2022-07-26 NOTE — ED TRIAGE NOTES
"Chief Complaint   Patient presents with   • Hallucinations     Pt having auditory hallucinations since 1700, pt stating the voice are telling her to hurt her family members. Pt denies plan on hurting family members. Pt called ems herself. Has caretaker at home. Denies SI       BIB [facility] to [facility], pt on monitor and in gown. Pt consists of: pt A&Ox4, on room air, steady gait. Pt has caretaker at home, pt states she takes her medication as prescribed.     Medications given en route:n/a     BP (!) 148/88   Pulse (!) 107   Temp 36.5 °C (97.7 °F) (Temporal)   Resp 18   Ht 1.753 m (5' 9\")   Wt 104 kg (230 lb)   SpO2 95%   BMI 33.97 kg/m²   "

## 2022-07-26 NOTE — ED NOTES
Patient given discharge teaching and education. Verbalizes understanding. Given chance to ask questions, all questions answered. Educated patient of signs and symptoms to returned to ER, and educated patient they can return for any concerning symptoms.  Patient states they have their belongings. Denies additional needs at this time. Reports pain is well controlled. Patient monitored every hour and PRN for safety and comfort. Patient ambulatory out of department.   Patient provided taxi voucher.  Patient's sister aware patient is on her way home.

## 2022-07-26 NOTE — DISCHARGE PLANNING
JUANIS asked to assist with DC Planning.   JUANIS spoke to Pt sister Jeet who states Pt can come stay with her she is home but does not have a vehicle. Pt sister [address]   JUANIS spoke to Pt mother Marisol who is in agreement for Pt to return to her sisters home, mother states Pt was actually going to move in with her sister on Saturday.     JUANIS assisted with a cab voucher [id number] and updated RN.   RN will call sister once Pt is en route to her apartment .

## 2022-07-26 NOTE — DISCHARGE PLANNING
[facility] ALERT TEAM DISCHARGE PLANNING NOTE    Date:  7/26/22  Patient Name:  Frances Ardon - 46 y.o. - Discharge Planning  MRN:  [id number]   YOB: 1975  ADMISSION DATE:  7/25/2022      Writer forwarded transfer packet for inpatient psychiatric care to the following community providers:  [facility], [facility], [facility]   Items  included in the transfer packet:   ___x__Face Sheet   ___x__Pages 1 and 2 of completed legal hold   ___x__Alert Team/Psych Assessment   ___x__H&P   ___x__UDS   ___x__Blood Alcohol   ___x__Vital signs   _____Pregnancy Test (if applicable)   _____Medications List   _____Covid Screen
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
## 2022-07-26 NOTE — ED NOTES
Per alert team RN pt being placed on legal hold and to be seen by psychiatrist. telesitter called to be placed in pt room.
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
## 2022-07-26 NOTE — DISCHARGE PLANNING
Group home staff will be picking pt up at 1200. ERP notified. Pt states that she listed Jeet's number incorrectly on her safety crisis plan. The correct number is [phone].

## 2022-07-26 NOTE — DISCHARGE INSTRUCTIONS
Follow-up with behavioral health at [facility] for reevaluation and medication management.    Continue home medications as previously indicated.    Return to the emergency department for suicidal or homicidal ideation, hallucination, unsafe behavior or other new concerns.

## 2022-07-26 NOTE — ED NOTES
Patient resting on gurney. No acute distress. Active chest rise noted. No agitation noted. No behavioral pain indicators. Tele sitter in direct view of patient.
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
## 2022-07-26 NOTE — DISCHARGE PLANNING
"Mother states that Frances acts out and reports hearing voices and having homicidal ideation as a way to have the police come and then go to the emergency room.  Mother states \"this is habitual. Look at her chart. I don't even know how many times she's done this, but it's a regular occurrence.\" Mother states Saadia at the group home doesn't know Frances and was unnecessarily afraid when she acted this way. Pt is to move in with her sister on Saturday. Mother states that pt can be discharged to her sister, Jeet, [phone], [address] [place]. Mother requests taxi home as sister is unable to pick pt up at this time.   "

## 2022-07-26 NOTE — DISCHARGE PLANNING
[facility] TEAM DISCHARGE PLANNING NOTE    Date: 7/26/2022  Patient Name:  Frances Ardon - 46 y.o. - Discharge Planning  MRN:  [id number]   YOB: 1975  ADMISSION DATE:  7/25/2022      Writer forwarded transfer packet for inpatient psychiatric care to the following community providers: [facility]   Items  included in the transfer packet:   __x___Face Sheet   __x___Pages 1 and 2 of completed legal hold   __x___Alert Team/Psych Assessment   __x___H&P   __x___UDS   __x___Blood Alcohol   __x___Vital signs   __pending___Pregnancy Test (if applicable)   __X___Medications List   _na____Covid Screen

## 2022-07-26 NOTE — DISCHARGE SUMMARY
"  ED Observation Discharge Summary    Patient:Frances Ardon  Patient : 1975  Patient MRN: [id number]  Patient PCP: TWILA Escoto    Admit Date: 2022  Discharge Date and Time: 22 11:39 AM  Discharge Diagnosis: Auditory hallucination  Discharge Attending: Frances Cisse D.O.  Discharge Service: ED Observation    ED Course  Frances is a 46 y.o. female who was evaluated at [facility] for hallucinations.  Known developmental delay and psychiatric disorder.  Cooperative in ED.  Compliant with medications.  Seen and evaluated by psychiatry this morning.  Legal hold has been discontinued.  Outpatient follow-up is secured.  To continue home medications as previously indicated.    Discharge Exam:  /66   Pulse 79   Temp 36.5 °C (97.7 °F) (Temporal)   Resp 18   Ht 1.753 m (5' 9\")   Wt 104 kg (230 lb)   SpO2 94%   BMI 33.97 kg/m² .    Constitutional: Awake and alert. Nontoxic  HENT:  Grossly normal  Eyes: Grossly normal  Neck: Normal range of motion  Cardiovascular: Normal peripheral perfusion  Thorax & Lungs: Nonlabored respirations.  Skin:  No pathologic rash.   Extremities: Well perfused  Psychiatric: Affect normal    Labs  Results for orders placed or performed during the hospital encounter of 22   URINE DRUG SCREEN   Result Value Ref Range    Amphetamines Urine Negative Negative    Barbiturates Negative Negative    Benzodiazepines Negative Negative    Cocaine Metabolite Negative Negative    Methadone Negative Negative    Opiates Negative Negative    Oxycodone Negative Negative    Phencyclidine -Pcp Negative Negative    Propoxyphene Negative Negative    Cannabinoid Metab Negative Negative   DIAGNOSTIC ALCOHOL   Result Value Ref Range    Diagnostic Alcohol <10.1 <10.1 mg/dL       Radiology  No orders to display         Medications:   New Prescriptions    No medications on file       Discharge Condition: Stable    Electronically signed by: Frances Cisse D.O., 2022 " 11:39 AM

## 2022-07-26 NOTE — ED PROVIDER NOTES
ED Provider Note    CHIEF COMPLAINT  Chief Complaint   Patient presents with   • Hallucinations     Pt having auditory hallucinations since 1700, pt stating the voice are telling her to hurt her family members. Pt denies plan on hurting family members. Pt called ems herself. Has caretaker at home. Denies SI       HPI  Frances Ardon is a 46 y.o. female who presents department chief complaint of auditory hallucinations.  She states she had them yesterday she went to [facility] she states they give her some medication and sent her home and then today started having hallucinations about hurting her family member.  She tells me that her primary caregivers medications to help with hallucinations but then tells me they will be going on for today she is never had a before but her prescriber gave them to her a while ago.  She does not know her family but she states voices are telling her to do and that makes her very nervous.  She called 911 herself.  States he been compliant with her medications.    REVIEW OF SYSTEMS  Positives as above. Pertinent negatives include headache fevers chills cough congestion neck stiffness weakness numbness tingling suicidal ideation  All other review of systems are negative    PAST MEDICAL HISTORY   has a past medical history of Arthritis, Asthma, Dyslipidemia (2016), GERD (gastroesophageal reflux disease) (2016), History of seizures (2016), Hypertension, Hypothyroidism (2016), Seizure (HCC), and Tobacco dependence (2016).    SOCIAL HISTORY  Social History     Tobacco Use   • Smoking status: Former Smoker     Packs/day: 1.00     Years: 12.00     Pack years: 12.00     Types: Cigarettes     Quit date: 2022     Years since quittin.0   • Smokeless tobacco: Never Used   Vaping Use   • Vaping Use: Never used   Substance and Sexual Activity   • Alcohol use: Not Currently   • Drug use: No   • Sexual activity: Not Currently     Partners: Male  "      SURGICAL HISTORY   has a past surgical history that includes open reduction; lap,diagnostic abdomen (N/A, 1/12/2022); salpingectomy (Bilateral, 1/12/2022); removal of heel bone (7/8/2022); exc tumor soft tissue leg/ankle subfascia* (7/8/2022); achilles tendon repair (Left, 7/8/2022); tendon lenghtening (7/8/2022); and bursa excision (7/8/2022).    CURRENT MEDICATIONS  Home Medications    **Home medications have not yet been reviewed for this encounter**         ALLERGIES  No Known Allergies    PHYSICAL EXAM  VITAL SIGNS: BP (!) 148/88   Pulse (!) 107   Temp 36.5 °C (97.7 °F) (Temporal)   Resp 18   Ht 1.753 m (5' 9\")   Wt 104 kg (230 lb)   SpO2 95%   BMI 33.97 kg/m²    Pulse ox interpretation: I interpret this pulse ox as normal.  Constitutional: Alert in no apparent distress.  HENT: Normocephalic atraumatic, MMM  Eyes: PER, Conjunctiva normal, Non-icteric.   Neck: Normal range of motion, No tenderness, Supple, No stridor.   Cardiovascular: Regular rate and rhythm, no murmurs.   Thorax & Lungs: Normal breath sounds, No respiratory distress, No wheezing, No chest tenderness.   Abdomen: Bowel sounds normal, Soft, No tenderness, No pulsatile masses. No peritoneal signs.  Skin: Warm, Dry, No erythema, No rash.   Back: No bony tenderness, No CVA tenderness.   Extremities/MSK: Intact equal distal pulses, No edema, No tenderness, No cyanosis, no major deformities noted  Neurologic: Alert and oriented x3, No focal deficits noted.   Psychiatric: Poor judgment and insight not responding to internal stimuli endorses hallucinations but does not want to hurt anyone no SI      DIFFERENTIAL DIAGNOSIS AND WORK UP PLAN    This is a 46 y.o. female who presents with possible hallucination she states her telling her to kill her family or least hurt her family she states she does not want to do it and that makes her nervous so she is here, she is not responding to internal stimuli she is actually resting pretty comfortably " not endorsing suicidal ideation and not trying to hurt her self.  We will the patient Zyprexa and have our behavioral health team, talk to her and decide whether or not she needs some good outpatient resources.  There is a history that she has been on Invega before so it sounds like whenever she is got mood stabilizer antipsychotic wise is not working.    DIAGNOSTIC STUDIES / PROCEDURES    EKG      LABS  Pertinent Lab Findings    Labs Reviewed   URINE DRUG SCREEN   DIAGNOSTIC ALCOHOL   negative       RADIOLOGY  No orders to display     The radiologist's interpretation of all radiological studies have been reviewed by me.      COURSE & MEDICAL DECISION MAKING  Pertinent Labs & Imaging studies reviewed. (See chart for details)    I spoke with our alert team who is going to evaluate patient at the bedside.  Currently she is not on a legal hold family is aware of the conditions of why she was brought here today.      Patient is resting comfortably was signed out to my partner for further disposition.      I verified that the patient was wearing a mask and I was wearing appropriate PPE every time I entered the room. The patient's mask was on the patient at all times during my encounter except for a brief view of the oropharynx.          FINAL IMPRESSION  1. Hallucinations             Electronically signed by: Frances David M.D., 7/25/2022 10:59 PM    This dictation has been created using voice recognition software and/or scribes. The accuracy of the dictation is limited by the abilities of the software and the expertise of the scribes. I expect there may be some errors of grammar and possibly content. I made every attempt to manually correct the errors within my dictation. However, errors related to voice recognition software and/or scribes may still exist and should be interpreted within the appropriate context.

## 2022-07-26 NOTE — ED NOTES
Patient's home medications have been reviewed by the pharmacy team.     Past Medical History:   Diagnosis Date   • Arthritis    • Asthma     no inhaler   • Dyslipidemia 12/27/2016   • GERD (gastroesophageal reflux disease) 12/27/2016   • History of seizures 12/27/2016   • Hypertension    • Hypothyroidism 12/27/2016   • Seizure (HCC)     as a baby   • Tobacco dependence 12/27/2016       Patient's Medications   New Prescriptions    No medications on file   Previous Medications    ACETAMINOPHEN (TYLENOL) 650 MG CR TABLET    Take 650 mg by mouth in the morning, at noon, and at bedtime.    ARIPIPRAZOLE (ABILIFY) 20 MG TABLET    Take 20 mg by mouth every day.    BUSPIRONE (BUSPAR) 15 MG TABLET    Take 15 mg by mouth at bedtime.    CYCLOBENZAPRINE (FLEXERIL) 5 MG TABLET    Take 5 mg by mouth in the morning, at noon, and at bedtime.    DOCUSATE SODIUM (COLACE) 100 MG CAP    Take 200 mg by mouth 2 times a day.    GABAPENTIN (NEURONTIN) 300 MG CAP    Take 300 mg by mouth 3 times a day.    HYDROXYZINE HCL (ATARAX) 10 MG TAB    Take 10 mg by mouth 3 times a day as needed for Anxiety.    HYDROXYZINE HCL (ATARAX) 50 MG TAB    Take 50 mg by mouth at bedtime.    LAMOTRIGINE (LAMICTAL) 200 MG TABLET    Take 200 mg by mouth every day.    LEVOTHYROXINE (SYNTHROID) 75 MCG TAB    Take 75 mcg by mouth every morning on an empty stomach.    LISINOPRIL (PRINIVIL) 5 MG TAB    Take 5 mg by mouth every day.    MELATONIN 5 MG TAB    Take 5 mg by mouth at bedtime.    OMEPRAZOLE (PRILOSEC) 20 MG DELAYED-RELEASE CAPSULE    Take 20 mg by mouth every day.    OXYBUTYNIN (DITROPAN XL) 15 MG CR TABLET    Take 15 mg by mouth every day.    PRAZOSIN (MINIPRESS) 2 MG CAP    Take 2 mg by mouth every evening.    SUCRALFATE (CARAFATE) 1 GM TAB    Take 1 g by mouth 4 Times a Day,Before Meals and at Bedtime.    TRIAMCINOLONE (KENALOG) 0.1 % LOTION    Apply 1 Application topically 2 times a day.   Modified Medications    No medications on file   Discontinued  Medications    ACETAMINOPHEN (TYLENOL) 500 MG TAB    680 mg in the morning, at noon, and at bedtime.    CEPHALEXIN (KEFLEX) 500 MG CAP    KEFLEX 500 MG CAPS    CYCLOBENZAPRINE (FLEXERIL) 10 MG TAB    Take 1 Tablet by mouth 3 times a day as needed for Moderate Pain.    DICLOFENAC SODIUM 1 % GEL    Apply 1 Application to affected area(s) as needed.    DISPOSABLE GLOVES (LATEX GLOVES LARGE) MISC    LATEX GLOVES LARGE    GABAPENTIN (NEURONTIN) 300 MG CAP    1 po q hs prn nerve pain    IBUPROFEN (MOTRIN) 600 MG TAB    Take 1 Tablet by mouth every 6 hours as needed for Moderate Pain.    IBUPROFEN (MOTRIN) 800 MG TAB    Take 1 Tablet by mouth every 8 hours as needed for Moderate Pain.    LEVOTHYROXINE (SYNTHROID) 50 MCG TAB    Take 1 Tab by mouth Every morning on an empty stomach.    LEVOTHYROXINE (SYNTHROID) 88 MCG TAB    LEVOTHYROXINE SODIUM 88 MCG TABS    LORATADINE (CLARITIN) 10 MG TAB    Take 10 mg by mouth every day. TAKE 1 TAB BY MOUTH EVERY DAY.    MECLIZINE (ANTIVERT) 25 MG TAB    Take 1 Tablet by mouth 3 times a day as needed.    MELATONIN 3 MG CAP    Take  by mouth.    MELOXICAM (MOBIC) 15 MG TABLET    Take 1 Tablet by mouth every day.    NAPROXEN (NAPROSYN) 500 MG TAB    Take 500 mg by mouth 2 times a day, with meals.    NITROFURANTOIN (MACROBID) 100 MG CAP    Take 1 Capsule by mouth 2 times a day.    OMEGA-3 FATTY ACIDS (FISH OIL) 500 MG CAP    FISH  MG CAPS    OXYBUTYNIN (DITROPAN) 5 MG TAB    Take 5 mg by mouth 3 times a day.    PALIPERIDONE (INVEGA) 3 MG ER TABLET    Take 3 mg by mouth.    POLYETHYLENE GLYCOL 3350 (MIRALAX) POWDER    POLYETHYLENE GLYCOL 3350 POWD    PROPRANOLOL (INDERAL) 10 MG TAB    Take 10 mg by mouth 3 times a day.          A: Medications do not appear to be contributing to current complaints.     P: Home medications have been discussed with the ER physician and reordered as appropriate. Will differ to Psychiatry for antipsychotic/anti-depressant recommendations (home aripiprazole  and buspirone not currently reordered). Lamotrigine ordered.     Negin Graff, PharmD, BCPS

## 2022-07-26 NOTE — ED PROVIDER NOTES
ED Provider Note    Assumed care from Dr. David. She presents with auditory hallucinations. Here she is calm, not distracted. Awaiting behavioral health RN evaluation. Given zyprexa here.   Alexander Gonzalez II, M.D. 7/26/2022 1:47 AM    She has been evaluated by TEE Abdullahi (behavioral health).  See his documentation for complete details. She will be placed on hold now and I will certify hold.  Concerns about her safety, group home concerns despite evaluation by outpatient providers yesterday.   Alexander Gonzalez II, M.D. 7/26/2022 3:09 AM

## 2022-08-26 ENCOUNTER — HOSPITAL ENCOUNTER (EMERGENCY)
Facility: MEDICAL CENTER | Age: 47
End: 2022-08-26
Attending: EMERGENCY MEDICINE
Payer: MEDICARE

## 2022-08-26 VITALS
TEMPERATURE: 98 F | RESPIRATION RATE: 16 BRPM | BODY MASS INDEX: 41.61 KG/M2 | HEART RATE: 88 BPM | SYSTOLIC BLOOD PRESSURE: 150 MMHG | WEIGHT: 281.75 LBS | OXYGEN SATURATION: 97 % | DIASTOLIC BLOOD PRESSURE: 80 MMHG

## 2022-08-26 DIAGNOSIS — Z59.00 HOMELESSNESS: ICD-10-CM

## 2022-08-26 DIAGNOSIS — M79.672 LEFT FOOT PAIN: ICD-10-CM

## 2022-08-26 DIAGNOSIS — R51.9 ACUTE NONINTRACTABLE HEADACHE, UNSPECIFIED HEADACHE TYPE: ICD-10-CM

## 2022-08-26 PROCEDURE — A9270 NON-COVERED ITEM OR SERVICE: HCPCS | Performed by: EMERGENCY MEDICINE

## 2022-08-26 PROCEDURE — 700102 HCHG RX REV CODE 250 W/ 637 OVERRIDE(OP): Performed by: EMERGENCY MEDICINE

## 2022-08-26 PROCEDURE — 99284 EMERGENCY DEPT VISIT MOD MDM: CPT

## 2022-08-26 RX ORDER — ACETAMINOPHEN 325 MG/1
650 TABLET ORAL ONCE
Status: COMPLETED | OUTPATIENT
Start: 2022-08-26 | End: 2022-08-26

## 2022-08-26 RX ORDER — IBUPROFEN 600 MG/1
600 TABLET ORAL ONCE
Status: COMPLETED | OUTPATIENT
Start: 2022-08-26 | End: 2022-08-26

## 2022-08-26 RX ADMIN — IBUPROFEN 600 MG: 600 TABLET ORAL at 18:26

## 2022-08-26 RX ADMIN — ACETAMINOPHEN 650 MG: 325 TABLET, FILM COATED ORAL at 18:26

## 2022-08-26 SDOH — ECONOMIC STABILITY - HOUSING INSECURITY: HOMELESSNESS UNSPECIFIED: Z59.00

## 2022-08-26 ASSESSMENT — FIBROSIS 4 INDEX: FIB4 SCORE: 0.69
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
## 2022-08-27 NOTE — DISCHARGE PLANNING
SW asked to assist with transportation of Pt to her sisters home ([address]).  Cab Voucher provided ([id number])
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
## 2022-10-02 ENCOUNTER — HOSPITAL ENCOUNTER (EMERGENCY)
Facility: MEDICAL CENTER | Age: 47
End: 2022-10-02
Attending: EMERGENCY MEDICINE
Payer: MEDICARE

## 2022-10-02 VITALS
SYSTOLIC BLOOD PRESSURE: 142 MMHG | BODY MASS INDEX: 40.59 KG/M2 | RESPIRATION RATE: 18 BRPM | TEMPERATURE: 97.7 F | OXYGEN SATURATION: 95 % | WEIGHT: 274.03 LBS | HEART RATE: 68 BPM | DIASTOLIC BLOOD PRESSURE: 73 MMHG | HEIGHT: 69 IN

## 2022-10-02 DIAGNOSIS — R10.33 PERIUMBILICAL ABDOMINAL PAIN: ICD-10-CM

## 2022-10-02 DIAGNOSIS — R30.0 DYSURIA: ICD-10-CM

## 2022-10-02 LAB
ALBUMIN SERPL BCP-MCNC: 4.6 G/DL (ref 3.2–4.9)
ALBUMIN/GLOB SERPL: 1.7 G/DL
ALP SERPL-CCNC: 92 U/L (ref 30–99)
ALT SERPL-CCNC: 14 U/L (ref 2–50)
ANION GAP SERPL CALC-SCNC: 10 MMOL/L (ref 7–16)
APPEARANCE UR: ABNORMAL
AST SERPL-CCNC: 13 U/L (ref 12–45)
BACTERIA #/AREA URNS HPF: ABNORMAL /HPF
BASOPHILS # BLD AUTO: 0.8 % (ref 0–1.8)
BASOPHILS # BLD: 0.08 K/UL (ref 0–0.12)
BILIRUB SERPL-MCNC: 0.3 MG/DL (ref 0.1–1.5)
BILIRUB UR QL STRIP.AUTO: NEGATIVE
BUN SERPL-MCNC: 13 MG/DL (ref 8–22)
CALCIUM SERPL-MCNC: 9.3 MG/DL (ref 8.5–10.5)
CHLORIDE SERPL-SCNC: 104 MMOL/L (ref 96–112)
CO2 SERPL-SCNC: 23 MMOL/L (ref 20–33)
COLOR UR: YELLOW
CREAT SERPL-MCNC: 0.74 MG/DL (ref 0.5–1.4)
EOSINOPHIL # BLD AUTO: 0.21 K/UL (ref 0–0.51)
EOSINOPHIL NFR BLD: 2.2 % (ref 0–6.9)
EPI CELLS #/AREA URNS HPF: ABNORMAL /HPF
ERYTHROCYTE [DISTWIDTH] IN BLOOD BY AUTOMATED COUNT: 47 FL (ref 35.9–50)
GFR SERPLBLD CREATININE-BSD FMLA CKD-EPI: 100 ML/MIN/1.73 M 2
GLOBULIN SER CALC-MCNC: 2.7 G/DL (ref 1.9–3.5)
GLUCOSE SERPL-MCNC: 99 MG/DL (ref 65–99)
GLUCOSE UR STRIP.AUTO-MCNC: NEGATIVE MG/DL
HCT VFR BLD AUTO: 36.7 % (ref 37–47)
HGB BLD-MCNC: 12 G/DL (ref 12–16)
HIV 1+2 AB+HIV1 P24 AG SERPL QL IA: NORMAL
HYALINE CASTS #/AREA URNS LPF: ABNORMAL /LPF
IMM GRANULOCYTES # BLD AUTO: 0.04 K/UL (ref 0–0.11)
IMM GRANULOCYTES NFR BLD AUTO: 0.4 % (ref 0–0.9)
KETONES UR STRIP.AUTO-MCNC: NEGATIVE MG/DL
LEUKOCYTE ESTERASE UR QL STRIP.AUTO: ABNORMAL
LIPASE SERPL-CCNC: 13 U/L (ref 11–82)
LYMPHOCYTES # BLD AUTO: 1.85 K/UL (ref 1–4.8)
LYMPHOCYTES NFR BLD: 19 % (ref 22–41)
MCH RBC QN AUTO: 29.5 PG (ref 27–33)
MCHC RBC AUTO-ENTMCNC: 32.7 G/DL (ref 33.6–35)
MCV RBC AUTO: 90.2 FL (ref 81.4–97.8)
MICRO URNS: ABNORMAL
MONOCYTES # BLD AUTO: 0.79 K/UL (ref 0–0.85)
MONOCYTES NFR BLD AUTO: 8.1 % (ref 0–13.4)
NEUTROPHILS # BLD AUTO: 6.77 K/UL (ref 2–7.15)
NEUTROPHILS NFR BLD: 69.5 % (ref 44–72)
NITRITE UR QL STRIP.AUTO: NEGATIVE
NRBC # BLD AUTO: 0 K/UL
NRBC BLD-RTO: 0 /100 WBC
PH UR STRIP.AUTO: 5 [PH] (ref 5–8)
PLATELET # BLD AUTO: 314 K/UL (ref 164–446)
PMV BLD AUTO: 9.6 FL (ref 9–12.9)
POTASSIUM SERPL-SCNC: 3.8 MMOL/L (ref 3.6–5.5)
PROT SERPL-MCNC: 7.3 G/DL (ref 6–8.2)
PROT UR QL STRIP: NEGATIVE MG/DL
RBC # BLD AUTO: 4.07 M/UL (ref 4.2–5.4)
RBC # URNS HPF: ABNORMAL /HPF
RBC UR QL AUTO: NEGATIVE
SODIUM SERPL-SCNC: 137 MMOL/L (ref 135–145)
SP GR UR STRIP.AUTO: 1.02
T PALLIDUM AB SER QL IA: NORMAL
UROBILINOGEN UR STRIP.AUTO-MCNC: 0.2 MG/DL
WBC # BLD AUTO: 9.7 K/UL (ref 4.8–10.8)
WBC #/AREA URNS HPF: ABNORMAL /HPF

## 2022-10-02 PROCEDURE — 36415 COLL VENOUS BLD VENIPUNCTURE: CPT

## 2022-10-02 PROCEDURE — 83690 ASSAY OF LIPASE: CPT

## 2022-10-02 PROCEDURE — 99284 EMERGENCY DEPT VISIT MOD MDM: CPT

## 2022-10-02 PROCEDURE — 87591 N.GONORRHOEAE DNA AMP PROB: CPT

## 2022-10-02 PROCEDURE — 87491 CHLMYD TRACH DNA AMP PROBE: CPT

## 2022-10-02 PROCEDURE — 81001 URINALYSIS AUTO W/SCOPE: CPT

## 2022-10-02 PROCEDURE — 86780 TREPONEMA PALLIDUM: CPT

## 2022-10-02 PROCEDURE — G0475 HIV COMBINATION ASSAY: HCPCS

## 2022-10-02 PROCEDURE — 85025 COMPLETE CBC W/AUTO DIFF WBC: CPT

## 2022-10-02 PROCEDURE — 80053 COMPREHEN METABOLIC PANEL: CPT

## 2022-10-02 ASSESSMENT — FIBROSIS 4 INDEX: FIB4 SCORE: 0.69

## 2022-10-02 NOTE — ED TRIAGE NOTES
"Chief Complaint   Patient presents with    Abdominal Pain     RLQ starting this am.    N/V     Starting this am. Associated with diarrhea.     Pt states she had her tubes tied by Dr. Dolan in January of 2022.    /81   Pulse 80   Temp 37.2 °C (99 °F) (Temporal)   Resp 16   Ht 1.753 m (5' 9\")   Wt 124 kg (274 lb 0.5 oz)   LMP  (LMP Unknown)   SpO2 94%   BMI 40.47 kg/m²     "

## 2022-10-02 NOTE — ED PROVIDER NOTES
ED Provider Note    CHIEF COMPLAINT  Chief Complaint   Patient presents with    Abdominal Pain     RLQ starting this am.    N/V     Starting this am. Associated with diarrhea.       HPI  Frances Ardon is a 46 y.o. female who presents for evaluation of mid abdominal pain which has been present for 5 hours.  Patient notes that it is associated with burning upon urination but no hematuria.  She also has some mild right flank pain associated with the dysuria.  She has no recent fevers, chills, nausea, or vomiting.  She does note one episode of scant diarrhea today.    REVIEW OF SYSTEMS  Constitutional: No fevers or chills  Skin: No rashes  HEENT: No sore throat, or runny nose  Neck: No neck pain  Chest: No pain   Pulm: No shortness of breath, cough, wheezing, stridor, or pain with inspiration/expiration  Gastrointestinal: No nausea, vomiting, constipation, bloating, melena, or hematochezia   Genitourinary: Dysuria noted.  No hematuria.  Musculoskeletal: No recent trauma, new pain, swelling, or weakness.  Chronic left foot pain  Heme: No bleeding or bruising problems.   Immuno: No hx of recurrent infections    PAST FAM HISTORY  Family History   Problem Relation Age of Onset    Cancer Neg Hx        PAST MEDICAL HISTORY   has a past medical history of Arthritis, Asthma, Dyslipidemia (2016), GERD (gastroesophageal reflux disease) (2016), History of seizures (2016), Hypertension, Hypothyroidism (2016), Seizure (HCC), and Tobacco dependence (2016).    SOCIAL HISTORY  Social History     Tobacco Use    Smoking status: Some Days     Packs/day: 0.10     Years: 12.00     Pack years: 1.20     Types: Cigarettes     Last attempt to quit: 2022     Years since quittin.2    Smokeless tobacco: Never   Vaping Use    Vaping Use: Never used   Substance and Sexual Activity    Alcohol use: Not Currently    Drug use: No    Sexual activity: Not Currently     Partners: Male       SURGICAL HISTORY    "has a past surgical history that includes open reduction; lap,diagnostic abdomen (N/A, 1/12/2022); salpingectomy (Bilateral, 1/12/2022); removal of heel bone (7/8/2022); exc tumor soft tissue leg/ankle subfascia* (7/8/2022); achilles tendon repair (Left, 7/8/2022); tendon lenghtening (7/8/2022); and bursa excision (7/8/2022).    CURRENT MEDICATIONS  Home Medications       Reviewed by Kim Coreas R.N. (Registered Nurse) on 10/02/22 at 1354  Med List Status: Not Addressed     Medication Last Dose Status   acetaminophen (TYLENOL) 650 MG CR tablet  Active   aripiprazole (ABILIFY) 20 MG tablet  Active   busPIRone (BUSPAR) 15 MG tablet  Active   cyclobenzaprine (FLEXERIL) 5 mg tablet  Active   docusate sodium (COLACE) 100 MG Cap  Active   gabapentin (NEURONTIN) 300 MG Cap  Active   hydrOXYzine HCl (ATARAX) 10 MG Tab  Active   hydrOXYzine HCl (ATARAX) 50 MG Tab  Active   lamotrigine (LAMICTAL) 200 MG tablet  Active   levothyroxine (SYNTHROID) 75 MCG Tab  Active   lisinopril (PRINIVIL) 5 MG Tab  Active   melatonin 5 mg Tab  Active   omeprazole (PRILOSEC) 20 MG delayed-release capsule  Active   oxybutynin (DITROPAN XL) 15 MG CR tablet  Active   prazosin (MINIPRESS) 2 MG Cap  Active   sucralfate (CARAFATE) 1 GM Tab  Active   triamcinolone (KENALOG) 0.1 % lotion  Active                    ALLERGIES  No Known Allergies    PHYSICAL EXAM  VITAL SIGNS: BP (!) 142/73   Pulse 68   Temp 36.5 °C (97.7 °F) (Temporal)   Resp 18   Ht 1.753 m (5' 9\")   Wt 124 kg (274 lb 0.5 oz)   LMP  (LMP Unknown)   SpO2 95%   BMI 40.47 kg/m²    Gen: Alert in no apparent distress.  HEENT: No signs of trauma, Bilateral external ears normal, Nose normal. Conjunctiva normal, Non-icteric.   Cardiovascular: Regular rate and rhythm, no murmurs.  Capillary refill less than 3 seconds to all extremities, 2+ distal pulses.  Thorax & Lungs: Normal breath sounds, No respiratory distress, No wheezing bilateral chest rise  Abdomen: Bowel sounds normal, " Soft, No apparent tenderness, No masses, No pulsatile masses. No Guarding or rebound  Skin: Warm, Dry, No erythema, No rash noted to exposed areas.   Back: No bony tenderness, mild right CVA tenderness  Extremities: Intact distal pulses, No edema  Neurologic: Alert , no facial droop, grossly normal coordination and strength  Psychiatric: Affect pleasant      LABS  Results for orders placed or performed during the hospital encounter of 10/02/22   Chlamydia/GC, PCR (Urine)    Specimen: Urine   Result Value Ref Range    Source Urine    T.PALLIDUM AB EDISON (Syphilis)   Result Value Ref Range    Syphilis, Treponemal Qual Non-Reactive Non-Reactive   HIV AG/AB Combo Assay Screening   Result Value Ref Range    HIV Ag/Ab Combo Assay Non-Reactive Non Reactive   CBC with Differential   Result Value Ref Range    WBC 9.7 4.8 - 10.8 K/uL    RBC 4.07 (L) 4.20 - 5.40 M/uL    Hemoglobin 12.0 12.0 - 16.0 g/dL    Hematocrit 36.7 (L) 37.0 - 47.0 %    MCV 90.2 81.4 - 97.8 fL    MCH 29.5 27.0 - 33.0 pg    MCHC 32.7 (L) 33.6 - 35.0 g/dL    RDW 47.0 35.9 - 50.0 fL    Platelet Count 314 164 - 446 K/uL    MPV 9.6 9.0 - 12.9 fL    Neutrophils-Polys 69.50 44.00 - 72.00 %    Lymphocytes 19.00 (L) 22.00 - 41.00 %    Monocytes 8.10 0.00 - 13.40 %    Eosinophils 2.20 0.00 - 6.90 %    Basophils 0.80 0.00 - 1.80 %    Immature Granulocytes 0.40 0.00 - 0.90 %    Nucleated RBC 0.00 /100 WBC    Neutrophils (Absolute) 6.77 2.00 - 7.15 K/uL    Lymphs (Absolute) 1.85 1.00 - 4.80 K/uL    Monos (Absolute) 0.79 0.00 - 0.85 K/uL    Eos (Absolute) 0.21 0.00 - 0.51 K/uL    Baso (Absolute) 0.08 0.00 - 0.12 K/uL    Immature Granulocytes (abs) 0.04 0.00 - 0.11 K/uL    NRBC (Absolute) 0.00 K/uL   Complete Metabolic Panel   Result Value Ref Range    Sodium 137 135 - 145 mmol/L    Potassium 3.8 3.6 - 5.5 mmol/L    Chloride 104 96 - 112 mmol/L    Co2 23 20 - 33 mmol/L    Anion Gap 10.0 7.0 - 16.0    Glucose 99 65 - 99 mg/dL    Bun 13 8 - 22 mg/dL    Creatinine 0.74 0.50  - 1.40 mg/dL    Calcium 9.3 8.5 - 10.5 mg/dL    AST(SGOT) 13 12 - 45 U/L    ALT(SGPT) 14 2 - 50 U/L    Alkaline Phosphatase 92 30 - 99 U/L    Total Bilirubin 0.3 0.1 - 1.5 mg/dL    Albumin 4.6 3.2 - 4.9 g/dL    Total Protein 7.3 6.0 - 8.2 g/dL    Globulin 2.7 1.9 - 3.5 g/dL    A-G Ratio 1.7 g/dL   Lipase   Result Value Ref Range    Lipase 13 11 - 82 U/L   Urinalysis    Specimen: Urine   Result Value Ref Range    Color Yellow     Character Cloudy (A)     Specific Gravity 1.016 <1.035    Ph 5.0 5.0 - 8.0    Glucose Negative Negative mg/dL    Ketones Negative Negative mg/dL    Protein Negative Negative mg/dL    Bilirubin Negative Negative    Urobilinogen, Urine 0.2 Negative    Nitrite Negative Negative    Leukocyte Esterase Small (A) Negative    Occult Blood Negative Negative    Micro Urine Req Microscopic    ESTIMATED GFR   Result Value Ref Range    GFR (CKD-EPI) 100 >60 mL/min/1.73 m 2   URINE MICROSCOPIC (W/UA)   Result Value Ref Range    WBC 2-5 /hpf    RBC 0-2 /hpf    Bacteria Few (A) None /hpf    Epithelial Cells Moderate (A) /hpf    Hyaline Cast 0-2 /lpf         COURSE & MEDICAL DECISION MAKING  Patient arrives for evaluation of mid abdominal pain as a primary complaint and,  secondarily, right flank pain.  Contrary to the intake note, the patient has no right lower quadrant pain and has no apparent tenderness, rebound, or guarding with the exception of some mild right CVA tenderness.  She does not appear septic or toxic and ambulates without difficulty.  Symptoms are most suggestive of a urinary tract infection, but appendicitis is also a possibility.  She does not have any vaginal discharge or bleeding and no pelvic pain.  I do not feel pelvic exam is necessary at this point and, provided her pregnancy test is negative, I do not feel imaging of her pelvis by ultrasound is necessary.  I very much doubt TOA or PID.  Should the urinalysis demonstrate a convincing infection, I do not feel CT imaging will be in the  patient's best interest as will be unlikely to .    5:56 PM  Reevaluated patient at bedside, she is calm, conversant, in no distress.  Given the findings today I do not suspect she has urinary tract infection or cystitis.  I also feel appendicitis is extremely unlikely.  Notable the patient's Williamson appendicitis score is 0.  I do not feel there are any other findings today to suggest the need for advanced imaging or further inpatient evaluation and I feel she is safe for discharge with watchful waiting at home.  She will treat symptomatically with over-the-counter pain medications as needed and follow-up with her primary care physician if symptoms persist.  She will otherwise return to the emergency department if symptoms worsen or change in any way as she states understanding that appendicitis is not ruled out.    FINAL IMPRESSION  1. Periumbilical abdominal pain    2. Dysuria        Electronically signed by: Gael Olivares M.D., 10/2/2022 2:48 PM

## 2022-10-03 NOTE — ED NOTES
PT educated on DC instructions & follow up care.  PT verbalized understanding.  All questions answered. ABC's intact.  VVS.  PT gait steady, ambulates w/o assist.

## 2022-10-09 NOTE — PROGRESS NOTES
"Subjective:   Frances Ardon is a 43 y.o. female who presents for Foot Pain (Lt. foot pain x 1 hour ago    (Does not recall injuring her foot)    HX: Lt. foot Bone spur--had surgery)        Patient comes clinic complaining last few hours of pain to left heel.  Notes past medical history of surgery \"removing a bone spur\" to this left heel.  Patient states she has been seen through [facility] in the last few weeks and x-rays were obtained and \"the bone spur has returned\".  She complains of pain this morning.  She comes to clinic today requesting a boot to help with ambulation and help with work time on her feet.  She notes no swelling.  Denies new trauma or injury.  Declines x-rays at today's evaluation.  Simply requests a boot.    Review of Systems   Constitutional: Negative for chills and fever.   Respiratory: Negative for shortness of breath.    Gastrointestinal: Negative for nausea and vomiting.   Musculoskeletal:        Pain to left heel   Skin: Negative for rash.   Neurological: Negative for tingling, sensory change and focal weakness.     No Known Allergies   Objective:   /84 (BP Location: Left arm, Patient Position: Sitting, BP Cuff Size: Adult)   Pulse 73   Temp 36.6 °C (97.8 °F) (Temporal)   Resp 16   Ht 1.778 m (5' 10\")   Wt (!) 126.6 kg (279 lb)   LMP 09/28/2019   SpO2 98%   BMI 40.03 kg/m²   Physical Exam   Constitutional: She is oriented to person, place, and time. She appears well-developed and well-nourished. No distress.   HENT:   Head: Normocephalic and atraumatic.   Right Ear: External ear normal.   Left Ear: External ear normal.   Nose: Nose normal.   Eyes: Conjunctivae and lids are normal. Right eye exhibits no discharge. Left eye exhibits no discharge. No scleral icterus.   Neck: Neck supple.   Pulmonary/Chest: Effort normal. No respiratory distress.   Musculoskeletal: Normal range of motion.        Feet:    Patient indicates pain to left heel, grossly normal, no erythema or " edema, no ecchymosis, no other areas of pain to ankle or foot, no effusions noted, non-antalgic gait, no plantar pain to palpation   Neurological: She is alert and oriented to person, place, and time. She is not disoriented.   Skin: Skin is warm and dry. She is not diaphoretic. No erythema. No pallor.   Psychiatric: Her speech is normal and behavior is normal.   Nursing note and vitals reviewed.  X-ray evaluation-patient declines      Assessment/Plan:   1. Muscle strain of left foot, initial encounter    Other orders  - Diclofenac Sodium 1 % Gel; Apply 1 Application to affected area(s) as needed.  .Recommend conservative care, rest, ice, elevation, work on gentle ROM exercises, recommendation to remove boot 3-4 times a day x15 to 20 minutes for stretching and range of motion, ice and elevate, follow-up in clinic in 1 to 2 weeks with persistent pain for referral to see orthopedics or sports medicine-okay to contact clinic by phone requesting referral    Return to clinic with lack of resolution or progression of symptoms.      Differential diagnosis, natural history, supportive care, and indications for immediate follow-up discussed.        choke episode/coughing

## 2022-11-03 ENCOUNTER — PATIENT MESSAGE (OUTPATIENT)
Dept: HEALTH INFORMATION MANAGEMENT | Facility: OTHER | Age: 47
End: 2022-11-03

## 2023-01-12 ENCOUNTER — HOSPITAL ENCOUNTER (EMERGENCY)
Facility: MEDICAL CENTER | Age: 48
End: 2023-01-12
Attending: EMERGENCY MEDICINE
Payer: MEDICARE

## 2023-01-12 ENCOUNTER — APPOINTMENT (OUTPATIENT)
Dept: RADIOLOGY | Facility: MEDICAL CENTER | Age: 48
End: 2023-01-12
Attending: EMERGENCY MEDICINE
Payer: MEDICARE

## 2023-01-12 VITALS
DIASTOLIC BLOOD PRESSURE: 84 MMHG | SYSTOLIC BLOOD PRESSURE: 139 MMHG | TEMPERATURE: 98.6 F | BODY MASS INDEX: 40.73 KG/M2 | WEIGHT: 275 LBS | HEART RATE: 72 BPM | RESPIRATION RATE: 14 BRPM | HEIGHT: 69 IN | OXYGEN SATURATION: 97 %

## 2023-01-12 DIAGNOSIS — R05.1 ACUTE COUGH: ICD-10-CM

## 2023-01-12 DIAGNOSIS — M16.0 OSTEOARTHRITIS OF BOTH HIPS, UNSPECIFIED OSTEOARTHRITIS TYPE: ICD-10-CM

## 2023-01-12 DIAGNOSIS — M25.559 HIP PAIN: ICD-10-CM

## 2023-01-12 PROCEDURE — 99283 EMERGENCY DEPT VISIT LOW MDM: CPT

## 2023-01-12 PROCEDURE — 71045 X-RAY EXAM CHEST 1 VIEW: CPT

## 2023-01-12 PROCEDURE — 72170 X-RAY EXAM OF PELVIS: CPT

## 2023-01-12 RX ORDER — NAPROXEN 500 MG/1
500 TABLET ORAL 2 TIMES DAILY WITH MEALS
Qty: 14 TABLET | Refills: 0 | Status: SHIPPED | OUTPATIENT
Start: 2023-01-12 | End: 2023-01-19

## 2023-01-12 ASSESSMENT — FIBROSIS 4 INDEX: FIB4 SCORE: 0.53

## 2023-01-13 NOTE — ED TRIAGE NOTES
"Chief Complaint   Patient presents with    Hip Pain     Denies trauma, right sided     Pt ambulatory into triage for complaints for right hip pain. Pt denies trauma. Pt states she was walking to the restroom when it started hurting.     Pt is alert and oriented, speaking in full sentences, follows commands and responds appropriately to questions.      Pt placed in lobby. Pt educated on triage process and apologized for wait time. Pt encouraged to alert staff for any changes.     Patient and staff wearing appropriate PPE      BP (!) 141/80   Pulse 71   Temp 37.2 °C (98.9 °F) (Temporal)   Resp 16   Ht 1.753 m (5' 9\")   Wt 125 kg (275 lb)   SpO2 96%       "

## 2023-01-13 NOTE — ED PROVIDER NOTES
ED Provider Note    CHIEF COMPLAINT  Chief Complaint   Patient presents with    Hip Pain     Denies trauma, right sided       EXTERNAL RECORDS REVIEWED  Select: Other      HPI/ROS  LIMITATION TO HISTORY   Select: : None  OUTSIDE HISTORIAN(S):  Select:      Frances Ardon is a 47 y.o. female who presents with bilateral hip pain since she woke up today.  States that she has pain to her hips when she stands up from a seated position.  She is able to walk still.  Denies any trauma to her hips or back.  No prior surgeries to her hips.    Incidentally she also noted that she coughed earlier today and had some chest discomfort as a result.  No active cough here in the emergency department.  No trouble with her breathing.    PAST MEDICAL HISTORY   has a past medical history of Arthritis, Asthma, Dyslipidemia (2016), GERD (gastroesophageal reflux disease) (2016), History of seizures (2016), Hypertension, Hypothyroidism (2016), Seizure (HCC), and Tobacco dependence (2016).    SURGICAL HISTORY   has a past surgical history that includes open reduction; lap,diagnostic abdomen (N/A, 2022); salpingectomy (Bilateral, 2022); removal of heel bone (2022); exc tumor soft tissue leg/ankle subfascia* (2022); achilles tendon repair (Left, 2022); tendon lenghtening (2022); and bursa excision (2022).    FAMILY HISTORY  Family History   Problem Relation Age of Onset    Cancer Neg Hx        SOCIAL HISTORY  Social History     Tobacco Use    Smoking status: Some Days     Packs/day: 0.10     Years: 12.00     Pack years: 1.20     Types: Cigarettes     Last attempt to quit: 2022     Years since quittin.5    Smokeless tobacco: Never   Vaping Use    Vaping Use: Never used   Substance and Sexual Activity    Alcohol use: Not Currently    Drug use: No    Sexual activity: Not Currently     Partners: Male       CURRENT MEDICATIONS  Home Medications       Reviewed by Lia BALTAZAR  "HEIDI Martinez (Registered Nurse) on 01/12/23 at 1813  Med List Status: Not Addressed     Medication Last Dose Status   acetaminophen (TYLENOL) 650 MG CR tablet  Active   aripiprazole (ABILIFY) 20 MG tablet  Active   busPIRone (BUSPAR) 15 MG tablet  Active   cyclobenzaprine (FLEXERIL) 5 mg tablet  Active   docusate sodium (COLACE) 100 MG Cap  Active   gabapentin (NEURONTIN) 300 MG Cap  Active   hydrOXYzine HCl (ATARAX) 10 MG Tab  Active   hydrOXYzine HCl (ATARAX) 50 MG Tab  Active   lamotrigine (LAMICTAL) 200 MG tablet  Active   levothyroxine (SYNTHROID) 75 MCG Tab  Active   lisinopril (PRINIVIL) 5 MG Tab  Active   melatonin 5 mg Tab  Active   omeprazole (PRILOSEC) 20 MG delayed-release capsule  Active   oxybutynin (DITROPAN XL) 15 MG CR tablet  Active   prazosin (MINIPRESS) 2 MG Cap  Active   sucralfate (CARAFATE) 1 GM Tab  Active   triamcinolone (KENALOG) 0.1 % lotion  Active                    ALLERGIES  No Known Allergies    PHYSICAL EXAM  VITAL SIGNS: BP (!) 141/80   Pulse 71   Temp 37.2 °C (98.9 °F) (Temporal)   Resp 16   Ht 1.753 m (5' 9\")   Wt 125 kg (275 lb)   SpO2 96%   BMI 40.61 kg/m²    Pulse ox interpretation: I interpret this pulse ox as normal.  Constitutional: Alert in no apparent distress.  HENT: Normocephalic, Atraumatic, Bilateral external ears normal. Nose normal.   Eyes: Pupils are equal and reactive. Conjunctiva normal, non-icteric.   Heart: Regular rate and rythm.    Lungs: Clear to auscultation bilaterally.  Musculoskeletal: Bilateral hip tenderness.  Normal range of motion of bilateral hips, knees, ankles.  Skin: Warm, Dry, No erythema, No rash.   Neurologic: Alert, Grossly non-focal.   Psychiatric: Affect normal, Judgment normal, Mood normal, Appears appropriate and not intoxicated.       DIAGNOSTIC STUDIES / PROCEDURES    RADIOLOGY  I have independently interpreted the diagnostic imaging associated with this visit and am waiting the final reading from the radiologist. "   DX-CHEST-PORTABLE (1 VIEW)   Final Result      No acute cardiac or pulmonary abnormalities are identified.      DX-PELVIS-1 OR 2 VIEWS   Final Result      Mild bilateral hip osteoarthritis.              COURSE & MEDICAL DECISION MAKING    ED Observation Status? No; Patient does not meet criteria for ED Observation.     INITIAL ASSESSMENT AND PLAN  Care Narrative: 47 y.o. female presenting with bilateral hip pain.  Nontraumatic.  Woke up feeling this way.  Has worsening pain when she walks upstairs or stands up from sitting.  Neurovascular intact.  No bony deformity.  No history of trauma.  Normal range of motion of all lower extremity joints.  X-ray of the pelvis was performed.  Showed bilateral osteoarthritis of the hip.  This is likely contributing to the patient's discomfort.  No obvious acute fractures identified.  Patient is still ambulatory.    The patient also noted some chest discomfort after coughing earlier today.  No active cough now.  Chest x-ray is unremarkable.  No obvious pulmonary abnormalities.  Normal vital signs.  No tachycardia or respiratory distress.  No hypoxia.  I do suspect that the discomfort in her chest is related to the coughing from earlier today however.    ADDITIONAL PROBLEM LIST AND DISPOSITION    Problem #1: Bilateral hip pain    I have discussed management of the patient with the following physicians and JP's:      Discussion of management with other QHP or appropriate source(s): Select:        Escalation of care considered, and ultimately not performed:     Barriers to care at this time, including but not limited to: Select:   .     Decision tools and prescription drugs considered including, but not limited to: Select:   .    HTN/IDDM FOLLOW UP:  The patient is referred to a primary physician for blood pressure management, diabetic screening, and for all other preventive health concerns        FINAL DIAGNOSIS  1. Osteoarthritis of both hips, unspecified osteoarthritis type     2. Hip pain    3. Acute cough           Electronically signed by: Manish Alexander M.D., 1/12/2023 7:52 PM

## 2023-01-13 NOTE — ED NOTES
Pt wheeled to [facility]; able to transfer w/o assistance. Pt resting NAD w/ equal chest rise/fall. Chart up for ERP.

## 2023-01-20 ENCOUNTER — HOSPITAL ENCOUNTER (EMERGENCY)
Facility: MEDICAL CENTER | Age: 48
End: 2023-01-20
Attending: EMERGENCY MEDICINE
Payer: MEDICARE

## 2023-01-20 ENCOUNTER — APPOINTMENT (OUTPATIENT)
Dept: RADIOLOGY | Facility: MEDICAL CENTER | Age: 48
End: 2023-01-20
Attending: EMERGENCY MEDICINE
Payer: MEDICARE

## 2023-01-20 VITALS
RESPIRATION RATE: 17 BRPM | OXYGEN SATURATION: 98 % | TEMPERATURE: 98.2 F | BODY MASS INDEX: 39.91 KG/M2 | SYSTOLIC BLOOD PRESSURE: 153 MMHG | HEART RATE: 79 BPM | WEIGHT: 270.28 LBS | DIASTOLIC BLOOD PRESSURE: 91 MMHG

## 2023-01-20 DIAGNOSIS — S96.911A STRAIN OF RIGHT FOOT, INITIAL ENCOUNTER: ICD-10-CM

## 2023-01-20 LAB
APPEARANCE UR: CLEAR
BACTERIA #/AREA URNS HPF: NEGATIVE /HPF
BILIRUB UR QL STRIP.AUTO: NEGATIVE
COLOR UR: YELLOW
EPI CELLS #/AREA URNS HPF: NEGATIVE /HPF
GLUCOSE UR STRIP.AUTO-MCNC: NEGATIVE MG/DL
HYALINE CASTS #/AREA URNS LPF: NORMAL /LPF
KETONES UR STRIP.AUTO-MCNC: NEGATIVE MG/DL
LEUKOCYTE ESTERASE UR QL STRIP.AUTO: ABNORMAL
MICRO URNS: ABNORMAL
NITRITE UR QL STRIP.AUTO: NEGATIVE
PH UR STRIP.AUTO: 6.5 [PH] (ref 5–8)
PROT UR QL STRIP: NEGATIVE MG/DL
RBC # URNS HPF: NORMAL /HPF
RBC UR QL AUTO: NEGATIVE
SP GR UR STRIP.AUTO: 1.01
UROBILINOGEN UR STRIP.AUTO-MCNC: 0.2 MG/DL
WBC #/AREA URNS HPF: NORMAL /HPF

## 2023-01-20 PROCEDURE — 81001 URINALYSIS AUTO W/SCOPE: CPT

## 2023-01-20 PROCEDURE — 73630 X-RAY EXAM OF FOOT: CPT | Mod: RT

## 2023-01-20 PROCEDURE — 99284 EMERGENCY DEPT VISIT MOD MDM: CPT

## 2023-01-20 PROCEDURE — A9270 NON-COVERED ITEM OR SERVICE: HCPCS | Performed by: EMERGENCY MEDICINE

## 2023-01-20 PROCEDURE — 700102 HCHG RX REV CODE 250 W/ 637 OVERRIDE(OP): Performed by: EMERGENCY MEDICINE

## 2023-01-20 RX ORDER — IBUPROFEN 800 MG/1
800 TABLET ORAL EVERY 8 HOURS PRN
Qty: 20 TABLET | Refills: 0 | Status: SHIPPED | OUTPATIENT
Start: 2023-01-20

## 2023-01-20 RX ORDER — IBUPROFEN 600 MG/1
600 TABLET ORAL ONCE
Status: COMPLETED | OUTPATIENT
Start: 2023-01-20 | End: 2023-01-20

## 2023-01-20 RX ADMIN — IBUPROFEN 600 MG: 600 TABLET, FILM COATED ORAL at 16:49

## 2023-01-20 ASSESSMENT — FIBROSIS 4 INDEX: FIB4 SCORE: 0.53

## 2023-01-20 NOTE — ED PROVIDER NOTES
ED Provider Note    CHIEF COMPLAINT  Chief Complaint   Patient presents with    Foot Pain     Left chronic foot pain.     Generalized Body Aches    Anxiety       EXTERNAL RECORDS REVIEWED  None    HPI/ROS  LIMITATION TO HISTORY   None  OUTSIDE HISTORIAN(S):  None    Frances Ardon is a 47 y.o. female who presents here for evaluation of right foot sprain, and dysuria.  Patient states that she twisted her foot yesterday, but did not fall to the ground.  States that she has right foot pain, but no other leg pain, chest pain, shortness of breath.  She has no abdominal pain.  She has no headache, and no neck pain.  Patient has no other medical concerns at this time.  She does have some dysuria, but no urgency or frequency.  Patient has not taken anything prior to arrival for the same.    PAST MEDICAL HISTORY   has a past medical history of Arthritis, Asthma, Dyslipidemia (2016), GERD (gastroesophageal reflux disease) (2016), History of seizures (2016), Hypertension, Hypothyroidism (2016), Seizure (HCC), and Tobacco dependence (2016).    SURGICAL HISTORY   has a past surgical history that includes open reduction; lap,diagnostic abdomen (N/A, 2022); salpingectomy (Bilateral, 2022); removal of heel bone (2022); exc tumor soft tissue leg/ankle subfascia* (2022); achilles tendon repair (Left, 2022); tendon lenghtening (2022); and bursa excision (2022).    FAMILY HISTORY  Family History   Problem Relation Age of Onset    Cancer Neg Hx        SOCIAL HISTORY  Social History     Tobacco Use    Smoking status: Some Days     Packs/day: 0.10     Years: 12.00     Pack years: 1.20     Types: Cigarettes     Last attempt to quit: 2022     Years since quittin.5    Smokeless tobacco: Never   Vaping Use    Vaping Use: Never used   Substance and Sexual Activity    Alcohol use: Not Currently    Drug use: No    Sexual activity: Not Currently     Partners: Male        CURRENT MEDICATIONS  Home Medications       Reviewed by Tawana Cisneros R.N. (Registered Nurse) on 01/20/23 at 1452  Med List Status: Partial     Medication Last Dose Status   acetaminophen (TYLENOL) 650 MG CR tablet  Active   aripiprazole (ABILIFY) 20 MG tablet  Active   busPIRone (BUSPAR) 15 MG tablet  Active   cyclobenzaprine (FLEXERIL) 5 mg tablet  Active   docusate sodium (COLACE) 100 MG Cap  Active   gabapentin (NEURONTIN) 300 MG Cap  Active   hydrOXYzine HCl (ATARAX) 10 MG Tab  Active   hydrOXYzine HCl (ATARAX) 50 MG Tab  Active   lamotrigine (LAMICTAL) 200 MG tablet  Active   levothyroxine (SYNTHROID) 75 MCG Tab  Active   lisinopril (PRINIVIL) 5 MG Tab  Active   melatonin 5 mg Tab  Active   omeprazole (PRILOSEC) 20 MG delayed-release capsule  Active   oxybutynin (DITROPAN XL) 15 MG CR tablet  Active   prazosin (MINIPRESS) 2 MG Cap  Active   sucralfate (CARAFATE) 1 GM Tab  Active   triamcinolone (KENALOG) 0.1 % lotion  Active                    ALLERGIES  No Known Allergies    PHYSICAL EXAM  VITAL SIGNS: BP (!) 156/95   Pulse 72   Temp 36.1 °C (97 °F) (Temporal)   Resp 16   Wt 123 kg (270 lb 4.5 oz)   LMP 01/11/2023 (Approximate)   SpO2 97%   BMI 39.91 kg/m²    Constitutional: Well developed, well nourished. No acute distress.  HEENT: Normocephalic, atraumatic. Posterior pharynx clear and moist.  Eyes:  EOMI. Normal sclera.  Neck: Supple, Full range of motion, nontender.  Back: No CVA tenderness, nontender midline, no step offs.  Musculoskeletal: No deformity, no edema, neurovascular intact.  Right lower extremity; tenderness to the right calcaneus, nontender right ankle.  Neurovascular tact distally.  No erythema, no edema.  Neuro: Clear speech, appropriate, cooperative, cranial nerves II-XII grossly intact.  Psych: Normal mood and affect      DIAGNOSTIC STUDIES / PROCEDURES      Results for orders placed or performed during the hospital encounter of 01/20/23   URINALYSIS,CULTURE IF  INDICATED    Specimen: Urine, Clean Catch   Result Value Ref Range    Color Yellow     Character Clear     Specific Gravity 1.008 <1.035    Ph 6.5 5.0 - 8.0    Glucose Negative Negative mg/dL    Ketones Negative Negative mg/dL    Protein Negative Negative mg/dL    Bilirubin Negative Negative    Urobilinogen, Urine 0.2 Negative    Nitrite Negative Negative    Leukocyte Esterase Trace (A) Negative    Occult Blood Negative Negative    Micro Urine Req Microscopic    URINE MICROSCOPIC (W/UA)   Result Value Ref Range    WBC 0-2 /hpf    RBC 0-2 /hpf    Bacteria Negative None /hpf    Epithelial Cells Negative /hpf    Hyaline Cast 0-2 /lpf         RADIOLOGY  I have independently interpreted the diagnostic imaging associated with this visit and am waiting the final reading from the radiologist.   My preliminary interpretation is a follows: no acute finding    DX-FOOT-COMPLETE 3+ RIGHT   Final Result      No evidence of acute fracture or dislocation.            COURSE & MEDICAL DECISION MAKING      INITIAL ASSESSMENT AND PLAN  Care Narrative: 47-year-old female here for evaluation of right foot pain secondary to spraining the foot with walking.  Patient denies any fall or trauma, she did strike her head, she has no other injuries to report this time.  Patient has complaints of dysuria as well, however she denies urgency or frequency.  She has no complaints of vaginal discharge.  Patient also has been given Motrin and feels better.  Her x-ray shows no acute finding of the right foot, she did request a fracture shoe, and states that this makes her feel better.  She has no ankle pain, no knee pain.    ADDITIONAL PROBLEM LIST AND DISPOSITION      Escalation of care considered, and ultimately not performed:    Barriers to care at this time, including but not limited to: Patient does not have established PCP.     Decision tools and prescription drugs considered including, but not limited to: Pain Medications return .          FINAL  DIAGNOSIS  1. Strain of right foot, initial encounter               Electronically signed by: Wyatt Dao D.O., 1/20/2023 3:55 PM

## 2023-01-20 NOTE — ED TRIAGE NOTES
.  Chief Complaint   Patient presents with    Foot Pain     Left chronic foot pain.     Generalized Body Aches    Anxiety     Biba from home. Hx left foot surgery in July reports continued pain. Also c/o allover pain.

## 2023-01-21 NOTE — ED NOTES
Pt is discharged. VSS. Paperwork explained to pt by ERP and all questions answered. All belongings sent with pt upon departure. Pt ambulatory with a steady gait out of ED.    MTM called on behalf of pt

## 2023-01-29 ENCOUNTER — APPOINTMENT (OUTPATIENT)
Dept: RADIOLOGY | Facility: MEDICAL CENTER | Age: 48
End: 2023-01-29
Attending: EMERGENCY MEDICINE
Payer: MEDICARE

## 2023-01-29 ENCOUNTER — HOSPITAL ENCOUNTER (EMERGENCY)
Facility: MEDICAL CENTER | Age: 48
End: 2023-01-29
Attending: EMERGENCY MEDICINE
Payer: MEDICARE

## 2023-01-29 VITALS
HEART RATE: 63 BPM | SYSTOLIC BLOOD PRESSURE: 144 MMHG | HEIGHT: 69 IN | RESPIRATION RATE: 16 BRPM | WEIGHT: 268.96 LBS | OXYGEN SATURATION: 97 % | BODY MASS INDEX: 39.84 KG/M2 | DIASTOLIC BLOOD PRESSURE: 93 MMHG | TEMPERATURE: 97.6 F

## 2023-01-29 DIAGNOSIS — K59.00 CONSTIPATION, UNSPECIFIED CONSTIPATION TYPE: ICD-10-CM

## 2023-01-29 DIAGNOSIS — K64.9 HEMORRHOIDS, UNSPECIFIED HEMORRHOID TYPE: ICD-10-CM

## 2023-01-29 DIAGNOSIS — R10.9 FLANK PAIN: ICD-10-CM

## 2023-01-29 LAB
ALBUMIN SERPL BCP-MCNC: 4.6 G/DL (ref 3.2–4.9)
ALBUMIN/GLOB SERPL: 1.4 G/DL
ALP SERPL-CCNC: 94 U/L (ref 30–99)
ALT SERPL-CCNC: 11 U/L (ref 2–50)
ANION GAP SERPL CALC-SCNC: 11 MMOL/L (ref 7–16)
APPEARANCE UR: CLEAR
AST SERPL-CCNC: 14 U/L (ref 12–45)
BACTERIA #/AREA URNS HPF: NEGATIVE /HPF
BASOPHILS # BLD AUTO: 1 % (ref 0–1.8)
BASOPHILS # BLD: 0.08 K/UL (ref 0–0.12)
BILIRUB SERPL-MCNC: 0.2 MG/DL (ref 0.1–1.5)
BILIRUB UR QL STRIP.AUTO: NEGATIVE
BUN SERPL-MCNC: 14 MG/DL (ref 8–22)
CALCIUM ALBUM COR SERPL-MCNC: 9.1 MG/DL (ref 8.5–10.5)
CALCIUM SERPL-MCNC: 9.6 MG/DL (ref 8.5–10.5)
CHLORIDE SERPL-SCNC: 102 MMOL/L (ref 96–112)
CO2 SERPL-SCNC: 23 MMOL/L (ref 20–33)
COLOR UR: YELLOW
CREAT SERPL-MCNC: 0.81 MG/DL (ref 0.5–1.4)
EOSINOPHIL # BLD AUTO: 0.19 K/UL (ref 0–0.51)
EOSINOPHIL NFR BLD: 2.3 % (ref 0–6.9)
EPI CELLS #/AREA URNS HPF: NEGATIVE /HPF
ERYTHROCYTE [DISTWIDTH] IN BLOOD BY AUTOMATED COUNT: 50.1 FL (ref 35.9–50)
GFR SERPLBLD CREATININE-BSD FMLA CKD-EPI: 89 ML/MIN/1.73 M 2
GLOBULIN SER CALC-MCNC: 3.2 G/DL (ref 1.9–3.5)
GLUCOSE SERPL-MCNC: 91 MG/DL (ref 65–99)
GLUCOSE UR STRIP.AUTO-MCNC: NEGATIVE MG/DL
HCG SERPL QL: NEGATIVE
HCT VFR BLD AUTO: 39.1 % (ref 37–47)
HGB BLD-MCNC: 12.8 G/DL (ref 12–16)
HYALINE CASTS #/AREA URNS LPF: ABNORMAL /LPF
IMM GRANULOCYTES # BLD AUTO: 0.03 K/UL (ref 0–0.11)
IMM GRANULOCYTES NFR BLD AUTO: 0.4 % (ref 0–0.9)
KETONES UR STRIP.AUTO-MCNC: NEGATIVE MG/DL
LEUKOCYTE ESTERASE UR QL STRIP.AUTO: ABNORMAL
LIPASE SERPL-CCNC: 21 U/L (ref 11–82)
LYMPHOCYTES # BLD AUTO: 2.15 K/UL (ref 1–4.8)
LYMPHOCYTES NFR BLD: 25.8 % (ref 22–41)
MCH RBC QN AUTO: 28.9 PG (ref 27–33)
MCHC RBC AUTO-ENTMCNC: 32.7 G/DL (ref 33.6–35)
MCV RBC AUTO: 88.3 FL (ref 81.4–97.8)
MICRO URNS: ABNORMAL
MONOCYTES # BLD AUTO: 0.56 K/UL (ref 0–0.85)
MONOCYTES NFR BLD AUTO: 6.7 % (ref 0–13.4)
NEUTROPHILS # BLD AUTO: 5.33 K/UL (ref 2–7.15)
NEUTROPHILS NFR BLD: 63.8 % (ref 44–72)
NITRITE UR QL STRIP.AUTO: NEGATIVE
NRBC # BLD AUTO: 0 K/UL
NRBC BLD-RTO: 0 /100 WBC
PH UR STRIP.AUTO: 6 [PH] (ref 5–8)
PLATELET # BLD AUTO: 279 K/UL (ref 164–446)
PMV BLD AUTO: 9.1 FL (ref 9–12.9)
POTASSIUM SERPL-SCNC: 4.1 MMOL/L (ref 3.6–5.5)
PROT SERPL-MCNC: 7.8 G/DL (ref 6–8.2)
PROT UR QL STRIP: NEGATIVE MG/DL
RBC # BLD AUTO: 4.43 M/UL (ref 4.2–5.4)
RBC # URNS HPF: ABNORMAL /HPF
RBC UR QL AUTO: NEGATIVE
SODIUM SERPL-SCNC: 136 MMOL/L (ref 135–145)
SP GR UR STRIP.AUTO: 1.01
UROBILINOGEN UR STRIP.AUTO-MCNC: 0.2 MG/DL
WBC # BLD AUTO: 8.3 K/UL (ref 4.8–10.8)
WBC #/AREA URNS HPF: ABNORMAL /HPF

## 2023-01-29 PROCEDURE — 83690 ASSAY OF LIPASE: CPT

## 2023-01-29 PROCEDURE — 81001 URINALYSIS AUTO W/SCOPE: CPT

## 2023-01-29 PROCEDURE — 99284 EMERGENCY DEPT VISIT MOD MDM: CPT

## 2023-01-29 PROCEDURE — 74176 CT ABD & PELVIS W/O CONTRAST: CPT

## 2023-01-29 PROCEDURE — 84703 CHORIONIC GONADOTROPIN ASSAY: CPT

## 2023-01-29 PROCEDURE — 85025 COMPLETE CBC W/AUTO DIFF WBC: CPT

## 2023-01-29 PROCEDURE — 94760 N-INVAS EAR/PLS OXIMETRY 1: CPT

## 2023-01-29 PROCEDURE — 96374 THER/PROPH/DIAG INJ IV PUSH: CPT

## 2023-01-29 PROCEDURE — 36415 COLL VENOUS BLD VENIPUNCTURE: CPT

## 2023-01-29 PROCEDURE — 80053 COMPREHEN METABOLIC PANEL: CPT

## 2023-01-29 PROCEDURE — 700111 HCHG RX REV CODE 636 W/ 250 OVERRIDE (IP): Performed by: EMERGENCY MEDICINE

## 2023-01-29 RX ORDER — KETOROLAC TROMETHAMINE 30 MG/ML
30 INJECTION, SOLUTION INTRAMUSCULAR; INTRAVENOUS ONCE
Status: COMPLETED | OUTPATIENT
Start: 2023-01-29 | End: 2023-01-29

## 2023-01-29 RX ADMIN — KETOROLAC TROMETHAMINE 30 MG: 30 INJECTION, SOLUTION INTRAMUSCULAR; INTRAVENOUS at 17:03

## 2023-01-29 ASSESSMENT — FIBROSIS 4 INDEX: FIB4 SCORE: 0.53

## 2023-01-29 NOTE — ED TRIAGE NOTES
"Chief Complaint   Patient presents with    Flank Pain     L sided. Pt states only when she goes to the bathroom it hurts. Pt reports urinating and then having a \"bunch of blood\" in the toilet after voiding.      Pt BIB EMS to triage for above. Pt reports dysuria.   "

## 2023-01-30 NOTE — ED NOTES
RN present during rectal exam. Per ERP a Hemorid is  present. Pt has been educated that she should take a dose of OTC Miralax daily. She has been educated on constipation and when to follow up. She is ambulatory on DC and an MTM ride has been called for her.

## 2023-01-30 NOTE — ED PROVIDER NOTES
"ER Provider Note    Scribed for Derik Rodgers M.d. by Mikhail Silvestre. 1/29/2023  4:30 PM    Primary Care Provider: TWILA Escoto    CHIEF COMPLAINT  Chief Complaint   Patient presents with    Flank Pain     L sided. Pt states only when she goes to the bathroom it hurts. Pt reports urinating and then having a \"bunch of blood\" in the toilet after voiding.      HPI/ROS  LIMITATION TO HISTORY   Select: : None  OUTSIDE HISTORIAN(S):  None    Frances Ardon is a 47 y.o. female who presents to the ED complaining of intermittent left sided flank pain onset today. While in the ED the patient also reports pain on the right side of her abdomen. She states that the pain began today while using the restroom. She also reports associated symptoms of dysuria and hematuria at this time, stating that there was a \"bunch of blood\" in the toilet after voiding today. Prompting her to present to the ED. While in the ED she reports additional associated symptoms of difficulty breathing secondary to pain. She denies any fevers, blood in her stool, nausea, or vomiting. Patient does not believe the blood in the toilet was from her stool or rectum. There are no known alleviating or exacerbating factors.  She has a history of hypothyroidism.     PAST MEDICAL HISTORY  Past Medical History:   Diagnosis Date    Arthritis     Asthma     no inhaler    Dyslipidemia 12/27/2016    GERD (gastroesophageal reflux disease) 12/27/2016    History of seizures 12/27/2016    Hypertension     Hypothyroidism 12/27/2016    Seizure (HCC)     as a baby    Tobacco dependence 12/27/2016       SURGICAL HISTORY  Past Surgical History:   Procedure Laterality Date    PB REMOVAL OF HEEL BONE  7/8/2022    Procedure: EXCISION, BECKY DEFORMITY, CALCANEUS;  Surgeon: Jarvis Swanson M.D.;  Location: [facility];  Service: Orthopedics    PB EXC TUMOR SOFT TISSUE LEG/ANKLE SUBFASCIA*  7/8/2022    Procedure: EXCISION, MASS, FOOT " OR ANKLE;  Surgeon: Jarvis Swanson M.D.;  Location: [facility];  Service: Orthopedics    ACHILLES TENDON REPAIR Left 7/8/2022    Procedure: LEFT GASTROC SSOLEUS RECESSION, ACHILLES DEBRIDEMENT AND REPAIR, RETROCALCANEAL BURSECTOMY, CALCANEUS SPUR EXCISION;  Surgeon: Jarvis Swanson M.D.;  Location: [facility];  Service: Orthopedics    TENDON LENGHTENING  7/8/2022    Procedure: LENGTHENING, TENDON;  Surgeon: Jarvis Swanson M.D.;  Location: [facility];  Service: Orthopedics    BURSA EXCISION  7/8/2022    Procedure: BURSECTOMY;  Surgeon: Jarvis Swanson M.D.;  Location: [facility];  Service: Orthopedics    ME LAP,DIAGNOSTIC ABDOMEN N/A 1/12/2022    Procedure: LAPAROSCOPY;  Surgeon: Kimmy Dolan D.O.;  Location: SURGERY SAME DAY [facility];  Service: Gynecology    SALPINGECTOMY Bilateral 1/12/2022    Procedure: SALPINGECTOMY;  Surgeon: Kimmy Dolan D.O.;  Location: SURGERY SAME DAY [facility];  Service: Gynecology    OPEN REDUCTION      left foot       FAMILY HISTORY  Family History   Problem Relation Age of Onset    Cancer Neg Hx        SOCIAL HISTORY   reports that she has been smoking cigarettes. She has a 1.20 pack-year smoking history. She has never used smokeless tobacco. She reports that she does not currently use alcohol. She reports that she does not use drugs.    CURRENT MEDICATIONS  Discharge Medication List as of 1/29/2023  7:38 PM        CONTINUE these medications which have NOT CHANGED    Details   ibuprofen (MOTRIN) 800 MG Tab Take 1 Tablet by mouth every 8 hours as needed for Mild Pain., Disp-20 Tablet, R-0, Normal      triamcinolone (KENALOG) 0.1 % lotion Apply 1 Application topically 2 times a day., Historical Med      sucralfate (CARAFATE) 1 GM Tab Take 1 g by mouth 4 Times a Day,Before Meals and at Bedtime., Historical Med      acetaminophen (TYLENOL) 650 MG CR tablet Take 650  "mg by mouth in the morning, at noon, and at bedtime., Historical Med      docusate sodium (COLACE) 100 MG Cap Take 200 mg by mouth 2 times a day., Historical Med      cyclobenzaprine (FLEXERIL) 5 mg tablet Take 5 mg by mouth in the morning, at noon, and at bedtime., Historical Med      levothyroxine (SYNTHROID) 75 MCG Tab Take 75 mcg by mouth every morning on an empty stomach., Historical Med      oxybutynin (DITROPAN XL) 15 MG CR tablet Take 15 mg by mouth every day., Historical Med      aripiprazole (ABILIFY) 20 MG tablet Take 20 mg by mouth every day., Historical Med      gabapentin (NEURONTIN) 300 MG Cap Take 300 mg by mouth 3 times a day., Historical Med      melatonin 5 mg Tab Take 5 mg by mouth at bedtime., Historical Med      prazosin (MINIPRESS) 2 MG Cap Take 2 mg by mouth every evening., Historical Med      !! hydrOXYzine HCl (ATARAX) 10 MG Tab Take 10 mg by mouth 3 times a day as needed for Anxiety., Historical Med      busPIRone (BUSPAR) 15 MG tablet Take 15 mg by mouth at bedtime., Historical Med      !! hydrOXYzine HCl (ATARAX) 50 MG Tab Take 50 mg by mouth at bedtime., Historical Med      lamotrigine (LAMICTAL) 200 MG tablet Take 200 mg by mouth every day., Historical Med      lisinopril (PRINIVIL) 5 MG Tab Take 5 mg by mouth every day., Historical Med      omeprazole (PRILOSEC) 20 MG delayed-release capsule Take 20 mg by mouth every day., Historical Med       !! - Potential duplicate medications found. Please discuss with provider.          ALLERGIES  Patient has no known allergies.    PHYSICAL EXAM  BP (!) 157/87   Pulse 72   Temp 36.3 °C (97.3 °F) (Temporal)   Resp 18   Ht 1.753 m (5' 9\")   Wt 122 kg (268 lb 15.4 oz)   LMP 01/11/2023 (Approximate)   SpO2 96%   BMI 39.72 kg/m²   Constitutional: Well developed, Well nourished, Mild distress,    HENT: Normocephalic, Atraumatic   Eyes: PERRLA, EOMI, Conjunctiva normal, No discharge.   Neck: No tenderness, Supple, No stridor.   Cardiovascular: " Normal heart rate, Normal rhythm.   Thorax & Lungs: Clear to auscultation bilaterally, No respiratory distress, No wheezing, No crackles.   Abdomen: Soft, Mild diffuse ab tenderness more pronounced on the right, No masses, No pulsatile masses.   Skin: Warm, Dry, No erythema, No rash.    Musculoskeletal: No major deformities noted.  Intact distal pulses  Neurologic: Awake, alert. Moves all extremities spontaneously.  Psychiatric: Affect normal, Judgment normal, Mood normal.         DIAGNOSTIC STUDIES    Labs:   Results for orders placed or performed during the hospital encounter of 01/29/23   CBC WITH DIFFERENTIAL   Result Value Ref Range    WBC 8.3 4.8 - 10.8 K/uL    RBC 4.43 4.20 - 5.40 M/uL    Hemoglobin 12.8 12.0 - 16.0 g/dL    Hematocrit 39.1 37.0 - 47.0 %    MCV 88.3 81.4 - 97.8 fL    MCH 28.9 27.0 - 33.0 pg    MCHC 32.7 (L) 33.6 - 35.0 g/dL    RDW 50.1 (H) 35.9 - 50.0 fL    Platelet Count 279 164 - 446 K/uL    MPV 9.1 9.0 - 12.9 fL    Neutrophils-Polys 63.80 44.00 - 72.00 %    Lymphocytes 25.80 22.00 - 41.00 %    Monocytes 6.70 0.00 - 13.40 %    Eosinophils 2.30 0.00 - 6.90 %    Basophils 1.00 0.00 - 1.80 %    Immature Granulocytes 0.40 0.00 - 0.90 %    Nucleated RBC 0.00 /100 WBC    Neutrophils (Absolute) 5.33 2.00 - 7.15 K/uL    Lymphs (Absolute) 2.15 1.00 - 4.80 K/uL    Monos (Absolute) 0.56 0.00 - 0.85 K/uL    Eos (Absolute) 0.19 0.00 - 0.51 K/uL    Baso (Absolute) 0.08 0.00 - 0.12 K/uL    Immature Granulocytes (abs) 0.03 0.00 - 0.11 K/uL    NRBC (Absolute) 0.00 K/uL   COMP METABOLIC PANEL   Result Value Ref Range    Sodium 136 135 - 145 mmol/L    Potassium 4.1 3.6 - 5.5 mmol/L    Chloride 102 96 - 112 mmol/L    Co2 23 20 - 33 mmol/L    Anion Gap 11.0 7.0 - 16.0    Glucose 91 65 - 99 mg/dL    Bun 14 8 - 22 mg/dL    Creatinine 0.81 0.50 - 1.40 mg/dL    Calcium 9.6 8.5 - 10.5 mg/dL    AST(SGOT) 14 12 - 45 U/L    ALT(SGPT) 11 2 - 50 U/L    Alkaline Phosphatase 94 30 - 99 U/L    Total Bilirubin 0.2 0.1 - 1.5  mg/dL    Albumin 4.6 3.2 - 4.9 g/dL    Total Protein 7.8 6.0 - 8.2 g/dL    Globulin 3.2 1.9 - 3.5 g/dL    A-G Ratio 1.4 g/dL   LIPASE   Result Value Ref Range    Lipase 21 11 - 82 U/L   URINALYSIS (UA)    Specimen: Urine   Result Value Ref Range    Color Yellow     Character Clear     Specific Gravity 1.011 <1.035    Ph 6.0 5.0 - 8.0    Glucose Negative Negative mg/dL    Ketones Negative Negative mg/dL    Protein Negative Negative mg/dL    Bilirubin Negative Negative    Urobilinogen, Urine 0.2 Negative    Nitrite Negative Negative    Leukocyte Esterase Small (A) Negative    Occult Blood Negative Negative    Micro Urine Req Microscopic    CORRECTED CALCIUM   Result Value Ref Range    Correct Calcium 9.1 8.5 - 10.5 mg/dL   ESTIMATED GFR   Result Value Ref Range    GFR (CKD-EPI) 89 >60 mL/min/1.73 m 2   URINE MICROSCOPIC (W/UA)   Result Value Ref Range    WBC 10-20 (A) /hpf    RBC 0-2 /hpf    Bacteria Negative None /hpf    Epithelial Cells Negative /hpf    Hyaline Cast 0-2 /lpf   BETA-HCG QUALITATIVE SERUM   Result Value Ref Range    Beta-Hcg Qualitative Serum Negative Negative        Radiology:   The attending emergency physician has independently interpreted the diagnostic imaging associated with this visit and am waiting the final reading from the radiologist.   Preliminary interpretation is a follows: No stone  Radiologist interpretation:     CT-RENAL COLIC EVALUATION(A/P W/O)   Final Result      1.   No renal or ureteral calculus or obstruction.      2.  Moderate right-sided colonic constipation.           COURSE & MEDICAL DECISION MAKING     ED Observation Status? Yes; I am placing the patient in to an observation status due to a diagnostic uncertainty as well as therapeutic intensity. Patient placed in observation status at 4:33 PM, 1/29/2023.     Observation plan is as follows: Lab work as detailed below.     Upon Reevaluation, the patient's condition has: Improved; and will be discharged.    Patient discharged  from ED Observation status at 750 pm (Time) 1/29/23 (Date).     INITIAL ASSESSMENT, COURSE AND PLAN  Care Narrative: Is coming in with nonspecific flank pain along with blood in the toilet.  She states that she believes it is from her urine.  The patient denies any nausea or vomiting, denies any hematuria or dysuria.  She does have some right-sided flank pain.  Differential is large, including constipation kidney stone intra-abdominal infections, small bowel obstruction.  He had a CT scan, laboratory test, urine, this was all unremarkable.  Believe the patient is likely constipated as seen on CT, discussed with her about constipation, did a rectal examination that shows a small deflated hemorrhoid.  I believe this is where the patient is seeing the blood in the toilet bowl.  I discussed with her to take MiraLAX, have the patient follow-up with her primary care physician, have the patient return with worsening symptoms.    4:42 PM - Patient seen and examined at bedside. Discussed plan of care, including lab work. Patient agrees to the plan of care. The patient will be medicated with Toradol injection 30 mg. Ordered for CBC with differential, CMP, Lipase, and UA to evaluate her symptoms. Differential diagnosis include but are not limited to: Kidney stone, Rectal bleed,  Intraabdominal infection, Gallbladder.      5:09 PM Ordered CT-renal colic evaluation for further evaluation.         DISPOSITION AND DISCUSSIONS  Decision tools and prescription drugs considered including, but not limited to: Pain Medications   .        FINAL DIANGOSIS  1. Flank pain    2. Constipation, unspecified constipation type    3. Hemorrhoids, unspecified hemorrhoid type         I, Mikhail Dodson), am scribing for, and in the presence of, Derik Rodgers M.D..    Electronically signed by: Mikhail Silvestre (Anuj), 1/29/2023    IDerik M.D. personally performed the services described in this documentation, as scribed by  Mikhail Silvestre in my presence, and it is both accurate and complete.      The note accurately reflects work and decisions made by me.  Derik Rodgers M.D.  1/29/2023  7:58 PM

## 2023-04-06 ENCOUNTER — OFFICE VISIT (OUTPATIENT)
Dept: URGENT CARE | Facility: CLINIC | Age: 48
End: 2023-04-06
Payer: MEDICARE

## 2023-04-06 ENCOUNTER — APPOINTMENT (OUTPATIENT)
Dept: RADIOLOGY | Facility: IMAGING CENTER | Age: 48
End: 2023-04-06
Attending: NURSE PRACTITIONER
Payer: MEDICARE

## 2023-04-06 VITALS
BODY MASS INDEX: 39.69 KG/M2 | HEART RATE: 80 BPM | HEIGHT: 69 IN | DIASTOLIC BLOOD PRESSURE: 68 MMHG | SYSTOLIC BLOOD PRESSURE: 108 MMHG | OXYGEN SATURATION: 97 % | RESPIRATION RATE: 14 BRPM | WEIGHT: 268 LBS | TEMPERATURE: 98.2 F

## 2023-04-06 DIAGNOSIS — L29.9 ITCHY SKIN: ICD-10-CM

## 2023-04-06 DIAGNOSIS — S69.91XA INJURY OF RIGHT HAND, INITIAL ENCOUNTER: ICD-10-CM

## 2023-04-06 DIAGNOSIS — S60.221A CONTUSION OF RIGHT HAND, INITIAL ENCOUNTER: ICD-10-CM

## 2023-04-06 PROCEDURE — 99214 OFFICE O/P EST MOD 30 MIN: CPT | Performed by: NURSE PRACTITIONER

## 2023-04-06 PROCEDURE — 73130 X-RAY EXAM OF HAND: CPT | Mod: TC,RT | Performed by: NURSE PRACTITIONER

## 2023-04-06 RX ORDER — CLOTRIMAZOLE 1 %
1 CREAM (GRAM) TOPICAL 2 TIMES DAILY
Qty: 40 G | Refills: 0 | Status: SHIPPED | OUTPATIENT
Start: 2023-04-06

## 2023-04-06 ASSESSMENT — FIBROSIS 4 INDEX: FIB4 SCORE: 0.56

## 2023-04-06 NOTE — PROGRESS NOTES
Frances Ardon is a 47 y.o. female who presents for Hand Injury (R hand, hit a part of the shower, hurts to the touch x last night ) and Rash (Between thighs, itchy, stings x today )      HPI  This is a new problem. Frances Ardon is a 47 y.o. patient who presents to urgent care with c/o: Right hand injury.  She hit her right hand against the metal part of the shower yesterday.  She had immediate pain.  She rested her hand.  It still very painful if she moves her hand or touches her hand.  Pain is at the fifth metacarpal.  She has some swelling.  She is also concerned about a possible rash between her thighs.  The rash is itchy and stings.  It is started today.  Treatments tried: otc cream not working    No other aggravating or alleviating factors.       ROS See HPI    Allergies:     No Known Allergies    PMSFS Hx:  Past Medical History:   Diagnosis Date    Arthritis     Asthma     no inhaler    Dyslipidemia 12/27/2016    GERD (gastroesophageal reflux disease) 12/27/2016    History of seizures 12/27/2016    Hypertension     Hypothyroidism 12/27/2016    Seizure (HCC)     as a baby    Tobacco dependence 12/27/2016     Past Surgical History:   Procedure Laterality Date    PB REMOVAL OF HEEL BONE  7/8/2022    Procedure: EXCISION, BECKY DEFORMITY, CALCANEUS;  Surgeon: Jarvis Swanson M.D.;  Location: [facility];  Service: Orthopedics    PB EXC TUMOR SOFT TISSUE LEG/ANKLE SUBFASCIA*  7/8/2022    Procedure: EXCISION, MASS, FOOT OR ANKLE;  Surgeon: Jarvis Swanson M.D.;  Location: [facility];  Service: Orthopedics    ACHILLES TENDON REPAIR Left 7/8/2022    Procedure: LEFT GASTROC SSOLEUS RECESSION, ACHILLES DEBRIDEMENT AND REPAIR, RETROCALCANEAL BURSECTOMY, CALCANEUS SPUR EXCISION;  Surgeon: Jarvis Swanson M.D.;  Location: [facility];  Service: Orthopedics    TENDON LENGHTENING  7/8/2022    Procedure: LENGTHENING,  TENDON;  Surgeon: Jarvis Swanson M.D.;  Location: [facility];  Service: Orthopedics    BURSA EXCISION  2022    Procedure: BURSECTOMY;  Surgeon: Jarvis Swanson M.D.;  Location: [facility];  Service: Orthopedics    NM LAP,DIAGNOSTIC ABDOMEN N/A 2022    Procedure: LAPAROSCOPY;  Surgeon: Kimmy Dolan D.O.;  Location: SURGERY SAME DAY [facility];  Service: Gynecology    SALPINGECTOMY Bilateral 2022    Procedure: SALPINGECTOMY;  Surgeon: Kimmy Dolan D.O.;  Location: SURGERY SAME DAY [facility];  Service: Gynecology    OPEN REDUCTION      left foot     Family History   Problem Relation Age of Onset    Cancer Neg Hx      Social History     Tobacco Use    Smoking status: Some Days     Packs/day: 0.10     Years: 12.00     Pack years: 1.20     Types: Cigarettes     Last attempt to quit: 2022     Years since quittin.7    Smokeless tobacco: Never   Substance Use Topics    Alcohol use: Not Currently       Problems:   Patient Active Problem List   Diagnosis    Tobacco dependence    Hypothyroidism    GERD (gastroesophageal reflux disease)    Mental retardation, mild (I.Q. 50-70)    Dyslipidemia    History of seizures    Morbid obesity (HCC)    BMI 40.0-44.9, adult ([facility])    Prediabetes    Schizophrenia ([facility])    Screening for malignant neoplasm of cervix    Achilles tendinitis, left leg       Medications:   Current Outpatient Medications on File Prior to Visit   Medication Sig Dispense Refill    meloxicam (MOBIC) 15 MG tablet Take 1 Tablet by mouth every day for 30 days. 30 Tablet 3    ibuprofen (MOTRIN) 800 MG Tab Take 1 Tablet by mouth every 8 hours as needed for Mild Pain. 20 Tablet 0    triamcinolone (KENALOG) 0.1 % lotion Apply 1 Application topically 2 times a day.      sucralfate (CARAFATE) 1 GM Tab Take 1 g by mouth 4 Times a Day,Before Meals and at Bedtime.      acetaminophen (TYLENOL) 650 MG CR tablet Take 650 mg by mouth in the  "morning, at noon, and at bedtime.      docusate sodium (COLACE) 100 MG Cap Take 200 mg by mouth 2 times a day.      cyclobenzaprine (FLEXERIL) 5 mg tablet Take 5 mg by mouth in the morning, at noon, and at bedtime.      levothyroxine (SYNTHROID) 75 MCG Tab Take 75 mcg by mouth every morning on an empty stomach.      oxybutynin (DITROPAN XL) 15 MG CR tablet Take 15 mg by mouth every day.      aripiprazole (ABILIFY) 20 MG tablet Take 20 mg by mouth every day.      gabapentin (NEURONTIN) 300 MG Cap Take 300 mg by mouth 3 times a day.      melatonin 5 mg Tab Take 5 mg by mouth at bedtime.      prazosin (MINIPRESS) 2 MG Cap Take 2 mg by mouth every evening.      hydrOXYzine HCl (ATARAX) 10 MG Tab Take 10 mg by mouth 3 times a day as needed for Anxiety.      busPIRone (BUSPAR) 15 MG tablet Take 15 mg by mouth at bedtime.      hydrOXYzine HCl (ATARAX) 50 MG Tab Take 50 mg by mouth at bedtime.      lamotrigine (LAMICTAL) 200 MG tablet Take 200 mg by mouth every day.      lisinopril (PRINIVIL) 5 MG Tab Take 5 mg by mouth every day.      omeprazole (PRILOSEC) 20 MG delayed-release capsule Take 20 mg by mouth every day.       No current facility-administered medications on file prior to visit.          Objective:     /68 (BP Location: Left arm, Patient Position: Sitting, BP Cuff Size: Large adult)   Pulse 80   Temp 36.8 °C (98.2 °F) (Temporal)   Resp 14   Ht 1.753 m (5' 9\")   Wt 122 kg (268 lb)   SpO2 97%   BMI 39.58 kg/m²     Physical Exam  Vitals and nursing note reviewed.   Constitutional:       Appearance: Normal appearance. She is normal weight.   Cardiovascular:      Rate and Rhythm: Normal rate.      Pulses: Normal pulses.   Pulmonary:      Effort: Pulmonary effort is normal.   Musculoskeletal:      Right hand: Swelling (mild over 5th metacarpal), tenderness and bony tenderness present. Normal strength. There is no disruption of two-point discrimination. Normal capillary refill. Normal pulse.   Skin:     " General: Skin is warm.      Capillary Refill: Capillary refill takes less than 2 seconds.      Findings: No bruising (right hand) or erythema (right groin).   Neurological:      Mental Status: She is alert.     RADIOLOGY RESULTS     DX-HAND 3+ RIGHT    Result Date: 4/6/2023 4/6/2023 2:41 PM HISTORY/REASON FOR EXAM:  Pain/Deformity Following Trauma. TECHNIQUE/EXAM DESCRIPTION AND NUMBER OF VIEWS:  3 views of the RIGHT hand. COMPARISON: May 18 FINDINGS: No acute fracture or subluxation is seen. There is some fourth finger soft tissue swelling but this is similar to comparison No radiopaque foreign body Ulnar minus variance     No radiographic evidence of acute traumatic injury.           Xray: Reviewed and interpreted independently by me. I agree with the radiologist's findings.       Assessment /Associated Orders:      1. Injury of right hand, initial encounter  DX-HAND 3+ RIGHT      2. Contusion of right hand, initial encounter        3. Itchy skin  clotrimazole (LOTRIMIN) 1 % Cream          Medical Decision Making:    Pt is clinically stable at today's acute urgent care visit.  No acute distress noted. Appropriate for outpatient care at this time.   Acute problem today with uncertain prognosis.   Ace wrap to hand for support/ comfort . Can wear for 2-3 days prn pain   Ice prn pain   Elevation prn pain     Wash skin daily with mild soap and water.   Educated in proper administration of  prescription medication(s) ordered today including safety, possible SE, risks, benefits, rationale and alternatives to therapy.       Discussed Dx, management options (risks,benefits, and alternatives to planned treatment), natural progression and supportive care.  Expressed understanding and the treatment plan was agreed upon.   Questions were encouraged and answered   Return to urgent care prn if new or worsening sx or if there is no improvement in condition prn.          Time I spent evaluating Frances Ardon in urgent  care today was 33  minutes. This time includes preparing for visit, reviewing any pertinent notes or test results, counseling/education, exam, obtaining HPI, interpretation of lab tests, medication management and documentation as indicated above.Time does not include separately billable procedures noted .       Please note that this dictation was created using voice recognition software. I have worked with consultants from the vendor as well as technical experts from [facility] to optimize the interface. I have made every reasonable attempt to correct obvious errors, but I expect that there are errors of grammar and possibly content that I did not discover before finalizing the note.  This note was electronically signed by provider

## 2023-05-18 ENCOUNTER — OFFICE VISIT (OUTPATIENT)
Dept: URGENT CARE | Facility: CLINIC | Age: 48
End: 2023-05-18
Payer: MEDICARE

## 2023-05-18 VITALS
HEIGHT: 69 IN | TEMPERATURE: 98.6 F | SYSTOLIC BLOOD PRESSURE: 126 MMHG | WEIGHT: 271 LBS | HEART RATE: 86 BPM | BODY MASS INDEX: 40.14 KG/M2 | RESPIRATION RATE: 18 BRPM | DIASTOLIC BLOOD PRESSURE: 86 MMHG | OXYGEN SATURATION: 99 %

## 2023-05-18 DIAGNOSIS — B37.2 CANDIDAL SKIN INFECTION: ICD-10-CM

## 2023-05-18 DIAGNOSIS — L03.319 CELLULITIS OF TRUNK, UNSPECIFIED SITE OF TRUNK: ICD-10-CM

## 2023-05-18 PROCEDURE — 99213 OFFICE O/P EST LOW 20 MIN: CPT | Performed by: NURSE PRACTITIONER

## 2023-05-18 PROCEDURE — 3079F DIAST BP 80-89 MM HG: CPT | Performed by: NURSE PRACTITIONER

## 2023-05-18 PROCEDURE — 3074F SYST BP LT 130 MM HG: CPT | Performed by: NURSE PRACTITIONER

## 2023-05-18 RX ORDER — CLOTRIMAZOLE 1 %
1 CREAM (GRAM) TOPICAL 2 TIMES DAILY
Qty: 60 G | Refills: 0 | Status: SHIPPED | OUTPATIENT
Start: 2023-05-18 | End: 2023-06-01

## 2023-05-18 RX ORDER — CEPHALEXIN 500 MG/1
500 CAPSULE ORAL 4 TIMES DAILY
Qty: 20 CAPSULE | Refills: 0 | Status: SHIPPED | OUTPATIENT
Start: 2023-05-18 | End: 2023-05-23

## 2023-05-18 ASSESSMENT — FIBROSIS 4 INDEX: FIB4 SCORE: 0.67

## 2023-05-19 NOTE — PATIENT INSTRUCTIONS
-Clean with gentle soap and water.   -Dry the area after showering.   -Watch for any signs of infection (redness, pus, pain, increased swelling or fever).  -Avoid scratching to cause any skin breakdown.  -Cool shower or compress may alleviate symptoms.    Follow up with PCP. Follow up for worsening symptoms, signs of infection, fever, pain, or any other concerns.

## 2023-05-19 NOTE — PROGRESS NOTES
Subjective:     Frances Ardon is a 47 y.o. female who presents for Rash ( Rash Under breast x 3 days)      Painful rash to right breast. Has been applying an unknown cream, and ice.           Rash  This is a new problem. The current episode started in the past 7 days. The rash is characterized by burning and redness. Pertinent negatives include no facial edema, fever or shortness of breath.       Past Medical History:   Diagnosis Date    Anxiety 4/19/2023    Arthritis     Asthma     no inhaler    Depressive disorder 5/24/2023    Dyslipidemia 12/27/2016    GERD (gastroesophageal reflux disease) 12/27/2016    History of seizures 12/27/2016    Hypertension     Hypothyroidism 12/27/2016    Seizure (HCC)     as a baby    Tobacco dependence 12/27/2016       Past Surgical History:   Procedure Laterality Date    PB REMOVAL OF HEEL BONE  7/8/2022    Procedure: EXCISION, BECKY DEFORMITY, CALCANEUS;  Surgeon: Jarvis Swanson M.D.;  Location: [facility];  Service: Orthopedics    PB EXC TUMOR SOFT TISSUE LEG/ANKLE SUBFASCIA*  7/8/2022    Procedure: EXCISION, MASS, FOOT OR ANKLE;  Surgeon: Jarvis Swanson M.D.;  Location: [facility];  Service: Orthopedics    ACHILLES TENDON REPAIR Left 7/8/2022    Procedure: LEFT GASTROC SSOLEUS RECESSION, ACHILLES DEBRIDEMENT AND REPAIR, RETROCALCANEAL BURSECTOMY, CALCANEUS SPUR EXCISION;  Surgeon: Jarvis Swanson M.D.;  Location: [facility];  Service: Orthopedics    TENDON LENGHTENING  7/8/2022    Procedure: LENGTHENING, TENDON;  Surgeon: Jarvis Swanson M.D.;  Location: [facility];  Service: Orthopedics    BURSA EXCISION  7/8/2022    Procedure: BURSECTOMY;  Surgeon: Jarvis Swanson M.D.;  Location: [facility];  Service: Orthopedics    AR LAP,DIAGNOSTIC ABDOMEN N/A 1/12/2022    Procedure: LAPAROSCOPY;  Surgeon: Kimmy Dolan D.O.;   "Location: SURGERY SAME DAY [facility];  Service: Gynecology    SALPINGECTOMY Bilateral 2022    Procedure: SALPINGECTOMY;  Surgeon: Kimmy Dolan D.O.;  Location: SURGERY SAME DAY [facility];  Service: Gynecology    OPEN REDUCTION      left foot       Social History     Socioeconomic History    Marital status: Single     Spouse name: Not on file    Number of children: Not on file    Years of education: Not on file    Highest education level: Not on file   Occupational History    Not on file   Tobacco Use    Smoking status: Some Days     Packs/day: 0.10     Years: 12.00     Pack years: 1.20     Types: Cigarettes     Last attempt to quit: 2022     Years since quittin.9    Smokeless tobacco: Never   Vaping Use    Vaping Use: Never used   Substance and Sexual Activity    Alcohol use: Not Currently    Drug use: No    Sexual activity: Not Currently     Partners: Male   Other Topics Concern    Not on file   Social History Narrative    Not on file     Social Determinants of Health     Financial Resource Strain: Not on file   Food Insecurity: Not on file   Transportation Needs: Not on file   Physical Activity: Not on file   Stress: Not on file   Social Connections: Not on file   Intimate Partner Violence: Not on file   Housing Stability: Not on file        Family History   Problem Relation Age of Onset    Cancer Neg Hx         No Known Allergies    Review of Systems   Constitutional:  Negative for fever.   Respiratory:  Negative for shortness of breath.    Skin:  Positive for rash.   All other systems reviewed and are negative.       Objective:   /86 (BP Location: Right arm, Patient Position: Sitting, BP Cuff Size: Adult)   Pulse 86   Temp 37 °C (98.6 °F) (Temporal)   Resp 18   Ht 1.753 m (5' 9\")   Wt 123 kg (271 lb)   SpO2 99%   BMI 40.02 kg/m²     Physical Exam  Vitals reviewed.   Constitutional:       General: She is not in acute distress.     Appearance: She is not toxic-appearing.   Pulmonary: "      Effort: Pulmonary effort is normal. No respiratory distress.   Skin:     Findings: Erythema and rash present.      Comments: Moist erythremic rash under right breast, satellite lesions, with demarcation to upper breast. Area tender to palpation.    Neurological:      Mental Status: She is alert and oriented to person, place, and time.         Assessment/Plan:   1. Candidal skin infection  - clotrimazole (LOTRIMIN) 1 % Cream; Apply 1 Application. topically 2 times a day for 14 days.  Dispense: 60 g; Refill: 0    2. Cellulitis of trunk, unspecified site of trunk  - cephALEXin (KEFLEX) 500 MG Cap; Take 1 Capsule by mouth 4 times a day for 5 days.  Dispense: 20 Capsule; Refill: 0  -Clean with gentle soap and water.   -Dry the area after showering.   -Watch for any signs of infection (redness, pus, pain, increased swelling or fever).  -Avoid scratching to cause any skin breakdown.  -Cool shower or compress may alleviate symptoms.    Follow up with PCP. Follow up for worsening symptoms, signs of infection, fever, pain, or any other concerns.     Discussed candidal infection. Demarcation to upper breat, initiaed antibiotic therapy to cover secondary bacterial infection with level of pain.     Differential diagnosis, natural history, supportive care, and indications for immediate follow-up discussed.

## 2023-05-24 PROBLEM — I10 HYPERTENSION: Status: ACTIVE | Noted: 2023-05-24

## 2023-05-24 PROBLEM — F25.9 SCHIZOAFFECTIVE DISORDER (HCC): Status: ACTIVE | Noted: 2023-03-15

## 2023-05-24 PROBLEM — F41.9 ANXIETY: Status: ACTIVE | Noted: 2023-04-19

## 2023-05-24 PROBLEM — F20.0 SCHIZOPHRENIA, PARANOID TYPE (HCC): Status: ACTIVE | Noted: 2023-05-08

## 2023-05-24 PROBLEM — E11.9 DIABETES MELLITUS (HCC): Status: ACTIVE | Noted: 2023-05-24

## 2023-05-24 PROBLEM — F32.A DEPRESSIVE DISORDER: Status: ACTIVE | Noted: 2023-05-24

## 2023-05-24 PROBLEM — R56.9 SEIZURE (HCC): Status: ACTIVE | Noted: 2023-05-24

## 2023-05-24 ASSESSMENT — ENCOUNTER SYMPTOMS
FEVER: 0
SHORTNESS OF BREATH: 0
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
## (undated) DEVICE — UTERINE MANIPULATOR 4.5MM ZSI - 1151 12/BX

## (undated) DEVICE — TUBE CONNECTING SUCTION - CLEAR PLASTIC STERILE 72 IN (50EA/CA)

## (undated) DEVICE — HEAD HOLDER JUNIOR/ADULT

## (undated) DEVICE — TOWEL STOP TIMEOUT SAFETY FLAG (40EA/CA)

## (undated) DEVICE — CANISTER SUCTION RIGID RED 1500CC (40EA/CA)

## (undated) DEVICE — ELECTRODE 850 FOAM ADHESIVE - HYDROGEL RADIOTRNSPRNT (50/PK)

## (undated) DEVICE — CHLORAPREP 26 ML APPLICATOR - ORANGE TINT(25/CA)

## (undated) DEVICE — CATHETER IV 20 GA X 1-1/4 ---SURG.& SDS ONLY--- (50EA/BX)

## (undated) DEVICE — KIT  I.V. START (100EA/CA)

## (undated) DEVICE — ELECTRODE DUAL RETURN W/ CORD - (50/PK)

## (undated) DEVICE — GLOVE BIOGEL PI ORTHO SZ 6 SURGICAL PF LF (40PR/BX)

## (undated) DEVICE — TUBING CLEARLINK DUO-VENT - C-FLO (48EA/CA)

## (undated) DEVICE — SET LEADWIRE 5 LEAD BEDSIDE DISPOSABLE ECG (1SET OF 5/EA)

## (undated) DEVICE — TROCAR 5X100 SEPARTATOR ADV - FIXATION (6/BX)

## (undated) DEVICE — GOWN SURGEONS X-LARGE - DISP. (30/CA)

## (undated) DEVICE — CANISTER SUCTION 3000ML MECHANICAL FILTER AUTO SHUTOFF MEDI-VAC NONSTERILE LF DISP  (40EA/CA)

## (undated) DEVICE — TROCAR Z THREAD 11 X 100 - BLADED (6/BX)

## (undated) DEVICE — SUTURE 4-0 VICRYL PLUS FS-2 - 27 INCH (36/BX)

## (undated) DEVICE — GVL 3 STAT DISPOSABLE - (10/BX)

## (undated) DEVICE — TRAY SRGPRP PVP IOD WT PRP - (20/CA)

## (undated) DEVICE — SODIUM CHL IRRIGATION 0.9% 1000ML (12EA/CA)

## (undated) DEVICE — TUBE E-T HI-LO CUFF 7.0MM (10EA/PK)

## (undated) DEVICE — TROCAR 5X100 NON BLADED Z-TH - READ KII (6/BX)

## (undated) DEVICE — LACTATED RINGERS INJ 1000 ML - (14EA/CA 60CA/PF)

## (undated) DEVICE — WATER IRRIGATION STERILE 1000ML (12EA/CA)

## (undated) DEVICE — SUCTION INSTRUMENT YANKAUER BULBOUS TIP W/O VENT (50EA/CA)

## (undated) DEVICE — TROCAR BLADED 5 X 150MM Z THREAD (6/BX)

## (undated) DEVICE — GOWN WARMING X-LARGE FLEX - (20/CA)

## (undated) DEVICE — NEEDLE INSFL 120MM 14GA VRRS - (20/BX)

## (undated) DEVICE — SET EXTENSION WITH 2 PORTS (48EA/CA) ***PART #2C8610 IS A SUBSTITUTE*****

## (undated) DEVICE — SEALER VESSEL HARMONIC ACE PLUS WITH ADVANCED HEMOSTASIS 36CM (1/EA)

## (undated) DEVICE — KIT ANESTHESIA W/CIRCUIT & 3/LT BAG W/FILTER (20EA/CA)

## (undated) DEVICE — CLOSURE SKIN STRIP 1/2 X 4 IN - (STERI STRIP) (50/BX 4BX/CA)

## (undated) DEVICE — SLEEVE, VASO, REPROC, LARGE

## (undated) DEVICE — GLOVE BIOGEL PI INDICATOR SZ 6.0 SURGICAL PF LF -(200PR/CA)

## (undated) DEVICE — GLOVE BIOGEL INDICATOR SZ 6.5 SURGICAL PF LTX - (50PR/BX 4BX/CA)

## (undated) DEVICE — SENSOR SPO2 NEO LNCS ADHESIVE (20/BX) SEE USER NOTES

## (undated) DEVICE — MASK ANESTHESIA ADULT  - (100/CA)

## (undated) DEVICE — SUTURE GENERAL

## (undated) DEVICE — PAD SANITARY 11IN MAXI IND WRAPPED  (12EA/PK 24PK/CA)

## (undated) DEVICE — PACK LAPAROSCOPY - (1/CA)

## (undated) DEVICE — ARMREST CRADLE FOAM - (2PR/PK 12PR/CA)